# Patient Record
Sex: MALE | Race: WHITE | NOT HISPANIC OR LATINO | Employment: UNEMPLOYED | URBAN - METROPOLITAN AREA
[De-identification: names, ages, dates, MRNs, and addresses within clinical notes are randomized per-mention and may not be internally consistent; named-entity substitution may affect disease eponyms.]

---

## 2019-02-12 ENCOUNTER — HOSPITAL ENCOUNTER (EMERGENCY)
Facility: HOSPITAL | Age: 59
Discharge: HOME | End: 2019-02-12
Attending: EMERGENCY MEDICINE
Payer: MEDICAID

## 2019-02-12 VITALS
HEART RATE: 90 BPM | TEMPERATURE: 99 F | SYSTOLIC BLOOD PRESSURE: 193 MMHG | OXYGEN SATURATION: 98 % | HEIGHT: 72 IN | DIASTOLIC BLOOD PRESSURE: 97 MMHG | BODY MASS INDEX: 23.03 KG/M2 | RESPIRATION RATE: 14 BRPM | WEIGHT: 170 LBS

## 2019-02-12 DIAGNOSIS — Z76.0 ENCOUNTER FOR MEDICATION REFILL: Primary | ICD-10-CM

## 2019-02-12 PROCEDURE — 99283 EMERGENCY DEPT VISIT LOW MDM: CPT

## 2019-02-12 RX ORDER — LACTULOSE 10 G/15ML
30 SOLUTION ORAL 3 TIMES DAILY
Qty: 4050 ML | Refills: 0 | Status: SHIPPED | OUTPATIENT
Start: 2019-02-12 | End: 2019-03-14

## 2019-02-12 RX ORDER — SPIRONOLACTONE 100 MG/1
100 TABLET, FILM COATED ORAL DAILY
Qty: 30 TABLET | Refills: 0 | Status: SHIPPED | OUTPATIENT
Start: 2019-02-12 | End: 2019-10-13

## 2019-02-12 RX ORDER — LACTULOSE 10 G/15ML
SOLUTION ORAL; RECTAL 3 TIMES DAILY
COMMUNITY
End: 2019-02-12

## 2019-02-12 NOTE — ED PROVIDER NOTES
Encounter Date: 2/12/2019    SCRIBE #1 NOTE: ILDEFONSO Shilpaanahi Ro, am scribing for, and in the presence of, Liane Childress PA-C.       History     Chief Complaint   Patient presents with    Medication Refill     Hep C. New to [place] medicaid. Wants meds filled.     2/12/2019  12:38 PM     The patient is a 58 y.o. male  who presents with medication refill. Patient states he has chronic hepatitis C and new to [place] medicaid. The patient has been off of his medications since running out three weeks ago. He is requesting a refill on his aldactone 100 mg daily and Lactulose 30 ml TID. Pt is also requesting a referral to a primary health care provider. He denied any complaints. PMHx of hepatitis C, cirrhosis and HTN. No SHx.        The history is provided by the patient.     Review of patient's allergies indicates:  No Known Allergies  Past Medical History:   Diagnosis Date    Cirrhosis     Hepatitis C     Hypertension      History reviewed. No pertinent surgical history.  History reviewed. No pertinent family history.  Social History     Tobacco Use    Smoking status: Current Every Day Smoker     Types: Cigarettes   Substance Use Topics    Alcohol use: Not on file    Drug use: Not on file     Review of Systems   Constitutional: Negative for appetite change, chills and fever.        +Medication refill.   HENT: Negative for congestion, rhinorrhea and sore throat.    Respiratory: Negative for cough and shortness of breath.    Cardiovascular: Negative for chest pain.   Gastrointestinal: Negative for abdominal pain, diarrhea, nausea and vomiting.   Genitourinary: Negative for dysuria.   Musculoskeletal: Negative for back pain and myalgias.   Skin: Negative for rash.   Neurological: Negative for weakness and numbness.   Hematological: Does not bruise/bleed easily.       Physical Exam     Initial Vitals [02/12/19 1230]   BP Pulse Resp Temp SpO2   (!) 193/97 90 14 98.7 °F (37.1 °C) 98 %      MAP       --          Physical Exam    Nursing note and vitals reviewed.  Constitutional: No distress.   HENT:   Head: Normocephalic and atraumatic.   Mouth/Throat: Mucous membranes are normal.   Eyes: EOM are normal. Pupils are equal, round, and reactive to light.   Neck: Normal range of motion.   Cardiovascular: Normal rate, regular rhythm, normal heart sounds and intact distal pulses. Exam reveals no gallop and no friction rub.    No murmur heard.  Pulmonary/Chest: Breath sounds normal. He has no wheezes. He has no rhonchi. He has no rales.   Abdominal: Soft. He exhibits no distension. There is no tenderness.   Musculoskeletal: Normal range of motion. He exhibits no edema.   Neurological: He is alert and oriented to person, place, and time. He has normal strength. No cranial nerve deficit or sensory deficit.   Cranial nerves 3-12 are intact.   Skin: Skin is dry. No rash noted.   Psychiatric: He has a normal mood and affect.         ED Course   Procedures  Labs Reviewed - No data to display       Imaging Results    None          Medical Decision Making:   History:   Old Medical Records: I decided to obtain old medical records.       APC / Resident Notes:   Urgent evaluation of a 58-year-old male who presents for medication refill.  He states he recently relocated here from [place] and has not been able to establish care with a primary care provider yet.  He is requesting a refill on his Aldactone and lactulose.  He recently received wheezing and Medicaid is in the process of establishing a primary care provider.  He is alert, oriented with a normal neurological exam.  Will give a short supply of medication along with a list of local primary care providers.  Instructed him on the importance of establishing care for routine follow-up of his chronic medical problems.  He voices understanding. Return precautions given. Based on my clinical evaluation, I do not appreciate any immediate, emergent, or life threatening condition or  etiology that warrants additional workup today and feel that the patient can be discharged with close follow up care.  Patient is to follow up with their primary care provider. Case was discussed with Dr. Pa who is in agreement with the plan of care. All questions answered.          Scribe Attestation:   Scribe #1: I performed the above scribed service and the documentation accurately describes the services I performed. I attest to the accuracy of the note.    I, Liane Childress PA-C, personally performed the services described in this documentation. All medical record entries made by the scribe were at my direction and in my presence.  I have reviewed the chart and agree that the record reflects my personal performance and is accurate and complete. Liane Childress PA-C.  2:18 PM 02/12/2019             Clinical Impression:   The encounter diagnosis was Encounter for medication refill.      Disposition:   Disposition: Discharged  Condition: Stable                        Liane Childress PA-C  02/12/19 1602

## 2019-02-12 NOTE — DISCHARGE INSTRUCTIONS
Establish care with your primary care provider in order to get your routine medications filled in the future.  Take these prescribed medications.  For worsening symptoms, chest pain, shortness of breath, increased abdominal pain, high grade fever, stroke or stroke like symptoms, immediately go to the nearest Emergency Room or call 911 as soon as possible.

## 2019-03-23 ENCOUNTER — HOSPITAL ENCOUNTER (EMERGENCY)
Facility: HOSPITAL | Age: 59
Discharge: HOME | End: 2019-03-23
Attending: EMERGENCY MEDICINE
Payer: MEDICAID

## 2019-03-23 VITALS
WEIGHT: 165 LBS | OXYGEN SATURATION: 95 % | SYSTOLIC BLOOD PRESSURE: 127 MMHG | DIASTOLIC BLOOD PRESSURE: 64 MMHG | RESPIRATION RATE: 18 BRPM | BODY MASS INDEX: 22.35 KG/M2 | HEART RATE: 82 BPM | HEIGHT: 72 IN | TEMPERATURE: 97 F

## 2019-03-23 DIAGNOSIS — L03.116 CELLULITIS OF LEFT LOWER EXTREMITY: ICD-10-CM

## 2019-03-23 DIAGNOSIS — B02.8 HERPES ZOSTER WITH OTHER COMPLICATION: Primary | ICD-10-CM

## 2019-03-23 PROCEDURE — 25000003 PHARM REV CODE 250: Performed by: PHYSICIAN ASSISTANT

## 2019-03-23 PROCEDURE — 99284 EMERGENCY DEPT VISIT MOD MDM: CPT

## 2019-03-23 RX ORDER — ACYCLOVIR 800 MG/1
800 TABLET ORAL
Qty: 35 TABLET | Refills: 0 | Status: ON HOLD | OUTPATIENT
Start: 2019-03-23 | End: 2020-08-31

## 2019-03-23 RX ORDER — MUPIROCIN 20 MG/G
OINTMENT TOPICAL 3 TIMES DAILY
Qty: 30 G | Refills: 0 | Status: ON HOLD | OUTPATIENT
Start: 2019-03-23 | End: 2019-03-29 | Stop reason: HOSPADM

## 2019-03-23 RX ORDER — MUPIROCIN 20 MG/G
1 OINTMENT TOPICAL
Status: COMPLETED | OUTPATIENT
Start: 2019-03-23 | End: 2019-03-23

## 2019-03-23 RX ORDER — CLINDAMYCIN HYDROCHLORIDE 150 MG/1
300 CAPSULE ORAL
Status: COMPLETED | OUTPATIENT
Start: 2019-03-23 | End: 2019-03-23

## 2019-03-23 RX ORDER — CLINDAMYCIN HYDROCHLORIDE 150 MG/1
300 CAPSULE ORAL 4 TIMES DAILY
Qty: 56 CAPSULE | Refills: 0 | Status: ON HOLD | OUTPATIENT
Start: 2019-03-23 | End: 2019-03-29 | Stop reason: HOSPADM

## 2019-03-23 RX ORDER — OXYCODONE HYDROCHLORIDE 5 MG/1
5 TABLET ORAL EVERY 6 HOURS PRN
Qty: 12 TABLET | Refills: 0 | Status: ON HOLD | OUTPATIENT
Start: 2019-03-23 | End: 2019-03-29 | Stop reason: HOSPADM

## 2019-03-23 RX ORDER — ACYCLOVIR 200 MG/1
800 CAPSULE ORAL
Status: COMPLETED | OUTPATIENT
Start: 2019-03-23 | End: 2019-03-23

## 2019-03-23 RX ORDER — OXYCODONE HYDROCHLORIDE 5 MG/1
5 TABLET ORAL
Status: COMPLETED | OUTPATIENT
Start: 2019-03-23 | End: 2019-03-23

## 2019-03-23 RX ADMIN — CLINDAMYCIN HYDROCHLORIDE 300 MG: 150 CAPSULE ORAL at 12:03

## 2019-03-23 RX ADMIN — ACYCLOVIR 800 MG: 200 CAPSULE ORAL at 12:03

## 2019-03-23 RX ADMIN — OXYCODONE HYDROCHLORIDE 5 MG: 5 TABLET ORAL at 12:03

## 2019-03-23 RX ADMIN — MUPIROCIN 22 G: 20 OINTMENT TOPICAL at 12:03

## 2019-03-23 NOTE — ED PROVIDER NOTES
"Encounter Date: 3/23/2019    SCRIBE #1 NOTE: I, Harvinder Patel, am scribing for, and in the presence of, Guillermina Romeo PA-C.       History     Chief Complaint   Patient presents with    Rash     to lt knee for past 4 days     Time seen by provider: 11:36 AM on 03/23/2019      Nii Davidson is a 58 y.o. male with a PMHx of hepatitis C, cirrhosis, and HTN who presents to the ED for further evaluation of a rash that started 4 days ago. The patient states that he is having a rash to the right knee on the lateral side. He states that he felt a burning sensation to the right knee, along with a small amount of redness. He states that it has increased in the redness and some swelling. Patient reports that he cannot bend his right knee currently secondary to pain and swelling. Patient reports that today "It started to pus up." He denies fever, drainage, abdominal pain, vomiting, and chills. He has no reported PSHx and NKDA.       The history is provided by the patient.     Review of patient's allergies indicates:  No Known Allergies  Past Medical History:   Diagnosis Date    Cirrhosis     Hepatitis C     Hypertension      No past surgical history on file.  No family history on file.  Social History     Tobacco Use    Smoking status: Current Every Day Smoker     Types: Cigarettes   Substance Use Topics    Alcohol use: Not on file    Drug use: Not on file     Review of Systems   Constitutional: Negative for chills and fever.   HENT: Negative for facial swelling and trouble swallowing.    Eyes: Negative for discharge.   Respiratory: Negative for cough, chest tightness, shortness of breath and wheezing.    Cardiovascular: Negative for chest pain and palpitations.   Gastrointestinal: Negative for abdominal pain, diarrhea, nausea and vomiting.   Genitourinary: Negative for dysuria and hematuria.   Musculoskeletal: Positive for arthralgias and joint swelling. Negative for back pain, myalgias, neck pain and neck " stiffness.   Skin: Positive for rash. Negative for color change, pallor and wound.   Neurological: Negative for dizziness, syncope, weakness, light-headedness, numbness and headaches.   Hematological: Does not bruise/bleed easily.   Psychiatric/Behavioral: The patient is not nervous/anxious.        Physical Exam     Initial Vitals [03/23/19 1106]   BP Pulse Resp Temp SpO2   127/64 82 18 97.4 °F (36.3 °C) 95 %      MAP       --         Physical Exam    Nursing note and vitals reviewed.  Constitutional: He appears well-developed and well-nourished. He is not diaphoretic. No distress.   HENT:   Head: Normocephalic and atraumatic.   Eyes: Conjunctivae are normal.   Cardiovascular: Normal rate, regular rhythm, normal heart sounds and intact distal pulses. Exam reveals no gallop and no friction rub.    No murmur heard.  Pulmonary/Chest: Breath sounds normal. No respiratory distress. He has no wheezes. He has no rhonchi. He has no rales.   Musculoskeletal: Normal range of motion. He exhibits edema and tenderness.   2+ pitting edema noted to left lower extremity.  Palpable 2+ pedal pulses.    Neurological: He is alert and oriented to person, place, and time. He has normal strength. No sensory deficit.   Skin: Skin is warm and dry. No rash and no abscess noted. There is erythema.   Moderately sized area of erythema with central bullae noted to left lateral knee without active bleeding or discharge. No induration or joint effusion to the left lateral knee.  Second moderately sized area of erythema with central bullae noted to left posterior lower leg without bleeding, discharge or induration.     Psychiatric: He has a normal mood and affect.                     ED Course   Procedures  Labs Reviewed - No data to display       Imaging Results    None          Medical Decision Making:   History:   Old Medical Records: I decided to obtain old medical records.  Differential Diagnosis:   Cellulitis  Shingles  Abscess         APC /  "Resident Notes:   Pt is concerned he has shingles.  There is possible underlying shingles etiology given it started with a burning sensation and "blisters"; however, there is moderate amount of surrounding erythema and associated leg edema.  Will treat with both Acyclovir and Clindamycin.  No abscess in need of drainage today.  He will be discharged home and is highly encouraged to return here on Monday if no improvement.  He voices understanding and is agreeable to the plan.  He is given specific return precautions.          Scribe Attestation:   Scribe #1: I performed the above scribed service and the documentation accurately describes the services I performed. I attest to the accuracy of the note.    I, Guillermina Romeo PA-C, personally performed the services described in this documentation. All medical record entries made by the scribe were at my direction and in my presence.  I have reviewed the chart and agree that the record reflects my personal performance and is accurate and complete. Guillermina Romeo PA-C.  9:27 PM 03/23/2019             Clinical Impression:       ICD-10-CM ICD-9-CM   1. Herpes zoster with other complication B02.8 053.79   2. Cellulitis of left lower extremity L03.116 682.6         Disposition:   Disposition: Discharged  Condition: Stable                        Guillermina Romeo PA-C  03/23/19 2127    "

## 2019-03-23 NOTE — ED PROVIDER NOTES
"Encounter Date: 3/23/2019    SCRIBE #1 NOTE: I, Harvinder Patel, am scribing for, and in the presence of, Guillermina Romeo PA-C.       History     Chief Complaint   Patient presents with    Rash     to lt knee for past 4 days     Time seen by provider: 11:36 AM on 03/23/2019      Nii Davidson is a 58 y.o. male with a PMHx of hepatitis C, cirrhosis, and HTN who presents to the ED for further evaluation of a rash that started 4 days ago. The patient states that he is having a rash to the right knee on the lateral side. He states that he felt a burning sensation to the right knee, along with a small amount of redness. He states that it has increased in the redness and some swelling. Patient reports that he cannot bend his right knee currently secondary to pain and swelling. Patient reports that today "It started to pus up." He denies fever, drainage, abdominal pain, vomiting, and chills. He has no reported PSHx and NKDA.           The history is provided by the patient.     Review of patient's allergies indicates:  No Known Allergies  Past Medical History:   Diagnosis Date    Cirrhosis     Hepatitis C     Hypertension      No past surgical history on file.  No family history on file.  Social History     Tobacco Use    Smoking status: Current Every Day Smoker     Types: Cigarettes   Substance Use Topics    Alcohol use: Not on file    Drug use: Not on file     Review of Systems   Constitutional: Negative for chills and fever.   HENT: Negative for facial swelling and trouble swallowing.    Eyes: Negative for discharge.   Respiratory: Negative for cough, chest tightness, shortness of breath and wheezing.    Cardiovascular: Negative for chest pain and palpitations.   Gastrointestinal: Negative for abdominal pain, diarrhea, nausea and vomiting.   Genitourinary: Negative for dysuria and hematuria.   Musculoskeletal: Positive for arthralgias and joint swelling. Negative for back pain, myalgias, neck pain and neck " stiffness.   Skin: Positive for rash. Negative for color change, pallor and wound.   Neurological: Negative for dizziness, syncope, weakness, light-headedness, numbness and headaches.   Hematological: Does not bruise/bleed easily.   Psychiatric/Behavioral: The patient is not nervous/anxious.        Physical Exam     Initial Vitals [03/23/19 1106]   BP Pulse Resp Temp SpO2   127/64 82 18 97.4 °F (36.3 °C) 95 %      MAP       --         Physical Exam    Nursing note and vitals reviewed.  Constitutional: He appears well-developed and well-nourished. He is not diaphoretic. No distress.   HENT:   Head: Normocephalic and atraumatic.   Right Ear: External ear normal.   Left Ear: External ear normal.   Nose: Nose normal.   Mouth/Throat: Oropharynx is clear and moist.   Eyes: Conjunctivae and EOM are normal. Pupils are equal, round, and reactive to light.   Neck: Normal range of motion. Neck supple.   Cardiovascular: Normal rate, regular rhythm, normal heart sounds and intact distal pulses. Exam reveals no gallop and no friction rub.    No murmur heard.  Pulmonary/Chest: Breath sounds normal. No respiratory distress. He has no wheezes. He has no rhonchi. He has no rales.   Abdominal: Soft. He exhibits no distension and no mass. There is no tenderness.   Musculoskeletal: Normal range of motion. He exhibits no edema or tenderness.   Lymphadenopathy:     He has no cervical adenopathy.   Neurological: He is alert and oriented to person, place, and time. He has normal strength. No cranial nerve deficit or sensory deficit.   Skin: Skin is warm and dry. No rash and no abscess noted. There is erythema.   Moderately sized area of erythema with central bullae, no active bleeding or discharge. No induration to the left lateral knee and left posterior lower leg.    Psychiatric: He has a normal mood and affect.         ED Course   Procedures  Labs Reviewed - No data to display       Imaging Results    None          Medical Decision  Making:   History:   Old Medical Records: I decided to obtain old medical records.  Clinical Tests:   Lab Tests: Ordered and Reviewed  Radiological Study: Ordered and Reviewed            Scribe Attestation:   Scribe #1: I performed the above scribed service and the documentation accurately describes the services I performed. I attest to the accuracy of the note.    ***           Clinical Impression:   No diagnosis found.      Disposition:   Disposition: Discharged  Condition: Stable

## 2019-03-25 ENCOUNTER — HOSPITAL ENCOUNTER (INPATIENT)
Facility: HOSPITAL | Age: 59
LOS: 4 days | Discharge: HOME | DRG: 603 | End: 2019-03-29
Attending: EMERGENCY MEDICINE | Admitting: INTERNAL MEDICINE
Payer: MEDICAID

## 2019-03-25 DIAGNOSIS — L03.90 CELLULITIS: Primary | ICD-10-CM

## 2019-03-25 PROBLEM — D53.9 MACROCYTIC ANEMIA: Status: ACTIVE | Noted: 2019-03-25

## 2019-03-25 PROBLEM — Z72.0 TOBACCO USE: Status: ACTIVE | Noted: 2019-03-25

## 2019-03-25 PROBLEM — E87.1 HYPONATREMIA: Status: ACTIVE | Noted: 2019-03-25

## 2019-03-25 PROBLEM — Z78.9 ALCOHOL USE: Status: ACTIVE | Noted: 2019-03-25

## 2019-03-25 PROBLEM — K74.60 CIRRHOSIS: Status: ACTIVE | Noted: 2019-03-25

## 2019-03-25 LAB
ALBUMIN SERPL BCP-MCNC: 2.1 G/DL (ref 3.5–5.2)
ALP SERPL-CCNC: 243 U/L (ref 55–135)
ALT SERPL W/O P-5'-P-CCNC: 49 U/L (ref 10–44)
ANION GAP SERPL CALC-SCNC: 4 MMOL/L (ref 8–16)
ANISOCYTOSIS BLD QL SMEAR: SLIGHT
APTT BLDCRRT: 35.7 SEC (ref 21–32)
AST SERPL-CCNC: 114 U/L (ref 10–40)
BASOPHILS # BLD AUTO: ABNORMAL K/UL (ref 0–0.2)
BASOPHILS NFR BLD: 0 % (ref 0–1.9)
BILIRUB DIRECT SERPL-MCNC: 0.9 MG/DL (ref 0.1–0.3)
BILIRUB SERPL-MCNC: 1.3 MG/DL (ref 0.1–1)
BUN SERPL-MCNC: 13 MG/DL (ref 6–20)
CALCIUM SERPL-MCNC: 8.1 MG/DL (ref 8.7–10.5)
CHLORIDE SERPL-SCNC: 105 MMOL/L (ref 95–110)
CO2 SERPL-SCNC: 24 MMOL/L (ref 23–29)
CREAT SERPL-MCNC: 0.9 MG/DL (ref 0.5–1.4)
CRP SERPL-MCNC: 6.5 MG/L (ref 0–8.2)
DIFFERENTIAL METHOD: ABNORMAL
EOSINOPHIL # BLD AUTO: ABNORMAL K/UL (ref 0–0.5)
EOSINOPHIL NFR BLD: 5 % (ref 0–8)
ERYTHROCYTE [DISTWIDTH] IN BLOOD BY AUTOMATED COUNT: 15.9 % (ref 11.5–14.5)
EST. GFR  (AFRICAN AMERICAN): >60 ML/MIN/1.73 M^2
EST. GFR  (NON AFRICAN AMERICAN): >60 ML/MIN/1.73 M^2
FERRITIN SERPL-MCNC: 258 NG/ML (ref 20–300)
FOLATE SERPL-MCNC: 20 NG/ML (ref 4–24)
GLUCOSE SERPL-MCNC: 93 MG/DL (ref 70–110)
HCT VFR BLD AUTO: 34.3 % (ref 40–54)
HGB BLD-MCNC: 11.4 G/DL (ref 14–18)
HIV1+2 IGG SERPL QL IA.RAPID: NEGATIVE
INR PPP: 1.3 (ref 0.8–1.2)
LACTATE SERPL-SCNC: 0.7 MMOL/L (ref 0.5–2.2)
LACTATE SERPL-SCNC: 2.1 MMOL/L (ref 0.5–2.2)
LYMPHOCYTES # BLD AUTO: ABNORMAL K/UL (ref 1–4.8)
LYMPHOCYTES NFR BLD: 17 % (ref 18–48)
MCH RBC QN AUTO: 37.1 PG (ref 27–31)
MCHC RBC AUTO-ENTMCNC: 33.2 G/DL (ref 32–36)
MCV RBC AUTO: 112 FL (ref 82–98)
MONOCYTES # BLD AUTO: ABNORMAL K/UL (ref 0.3–1)
MONOCYTES NFR BLD: 27 % (ref 4–15)
NEUTROPHILS NFR BLD: 51 % (ref 38–73)
PLATELET # BLD AUTO: 107 K/UL (ref 150–350)
PLATELET BLD QL SMEAR: ABNORMAL
PMV BLD AUTO: 9.9 FL (ref 9.2–12.9)
POLYCHROMASIA BLD QL SMEAR: ABNORMAL
POTASSIUM SERPL-SCNC: 4 MMOL/L (ref 3.5–5.1)
PROT SERPL-MCNC: 6.5 G/DL (ref 6–8.4)
PROTHROMBIN TIME: 12.9 SEC (ref 9–12.5)
RBC # BLD AUTO: 3.07 M/UL (ref 4.6–6.2)
SODIUM SERPL-SCNC: 133 MMOL/L (ref 136–145)
TSH SERPL DL<=0.005 MIU/L-ACNC: 1.44 UIU/ML (ref 0.4–4)
VIT B12 SERPL-MCNC: >2000 PG/ML (ref 210–950)
WBC # BLD AUTO: 4.7 K/UL (ref 3.9–12.7)

## 2019-03-25 PROCEDURE — 63600175 PHARM REV CODE 636 W HCPCS: Performed by: EMERGENCY MEDICINE

## 2019-03-25 PROCEDURE — 63600175 PHARM REV CODE 636 W HCPCS: Performed by: INTERNAL MEDICINE

## 2019-03-25 PROCEDURE — 80048 BASIC METABOLIC PNL TOTAL CA: CPT

## 2019-03-25 PROCEDURE — 96372 THER/PROPH/DIAG INJ SC/IM: CPT | Mod: 59

## 2019-03-25 PROCEDURE — 85610 PROTHROMBIN TIME: CPT

## 2019-03-25 PROCEDURE — 82746 ASSAY OF FOLIC ACID SERUM: CPT

## 2019-03-25 PROCEDURE — 96367 TX/PROPH/DG ADDL SEQ IV INF: CPT | Mod: 59

## 2019-03-25 PROCEDURE — 25000003 PHARM REV CODE 250: Performed by: EMERGENCY MEDICINE

## 2019-03-25 PROCEDURE — 82728 ASSAY OF FERRITIN: CPT

## 2019-03-25 PROCEDURE — 84443 ASSAY THYROID STIM HORMONE: CPT

## 2019-03-25 PROCEDURE — 96365 THER/PROPH/DIAG IV INF INIT: CPT | Mod: 59

## 2019-03-25 PROCEDURE — S0077 INJECTION, CLINDAMYCIN PHOSP: HCPCS | Performed by: EMERGENCY MEDICINE

## 2019-03-25 PROCEDURE — 96375 TX/PRO/DX INJ NEW DRUG ADDON: CPT | Mod: 59

## 2019-03-25 PROCEDURE — 99285 EMERGENCY DEPT VISIT HI MDM: CPT | Mod: 25

## 2019-03-25 PROCEDURE — 87040 BLOOD CULTURE FOR BACTERIA: CPT

## 2019-03-25 PROCEDURE — 86703 HIV-1/HIV-2 1 RESULT ANTBDY: CPT

## 2019-03-25 PROCEDURE — 25000003 PHARM REV CODE 250: Performed by: INTERNAL MEDICINE

## 2019-03-25 PROCEDURE — 96366 THER/PROPH/DIAG IV INF ADDON: CPT | Mod: 59

## 2019-03-25 PROCEDURE — 80076 HEPATIC FUNCTION PANEL: CPT

## 2019-03-25 PROCEDURE — 12000002 HC ACUTE/MED SURGE SEMI-PRIVATE ROOM

## 2019-03-25 PROCEDURE — 86140 C-REACTIVE PROTEIN: CPT

## 2019-03-25 PROCEDURE — 87040 BLOOD CULTURE FOR BACTERIA: CPT | Mod: 59

## 2019-03-25 PROCEDURE — 83540 ASSAY OF IRON: CPT

## 2019-03-25 PROCEDURE — 82607 VITAMIN B-12: CPT

## 2019-03-25 PROCEDURE — 10060 I&D ABSCESS SIMPLE/SINGLE: CPT

## 2019-03-25 PROCEDURE — 36415 COLL VENOUS BLD VENIPUNCTURE: CPT

## 2019-03-25 PROCEDURE — 85027 COMPLETE CBC AUTOMATED: CPT

## 2019-03-25 PROCEDURE — 85730 THROMBOPLASTIN TIME PARTIAL: CPT

## 2019-03-25 PROCEDURE — 83605 ASSAY OF LACTIC ACID: CPT

## 2019-03-25 PROCEDURE — 85007 BL SMEAR W/DIFF WBC COUNT: CPT

## 2019-03-25 RX ORDER — ONDANSETRON 2 MG/ML
4 INJECTION INTRAMUSCULAR; INTRAVENOUS EVERY 8 HOURS PRN
Status: DISCONTINUED | OUTPATIENT
Start: 2019-03-25 | End: 2019-03-29 | Stop reason: HOSPADM

## 2019-03-25 RX ORDER — MORPHINE SULFATE 10 MG/ML
8 INJECTION INTRAMUSCULAR; INTRAVENOUS; SUBCUTANEOUS
Status: COMPLETED | OUTPATIENT
Start: 2019-03-25 | End: 2019-03-25

## 2019-03-25 RX ORDER — MORPHINE SULFATE 4 MG/ML
8 INJECTION, SOLUTION INTRAMUSCULAR; INTRAVENOUS
Status: DISCONTINUED | OUTPATIENT
Start: 2019-03-25 | End: 2019-03-25 | Stop reason: RX

## 2019-03-25 RX ORDER — ACETAMINOPHEN 325 MG/1
650 TABLET ORAL EVERY 8 HOURS PRN
Status: DISCONTINUED | OUTPATIENT
Start: 2019-03-25 | End: 2019-03-29 | Stop reason: HOSPADM

## 2019-03-25 RX ORDER — SODIUM CHLORIDE 0.9 % (FLUSH) 0.9 %
5 SYRINGE (ML) INJECTION
Status: DISCONTINUED | OUTPATIENT
Start: 2019-03-25 | End: 2019-03-29 | Stop reason: HOSPADM

## 2019-03-25 RX ORDER — CLINDAMYCIN PHOSPHATE 900 MG/50ML
900 INJECTION, SOLUTION INTRAVENOUS
Status: COMPLETED | OUTPATIENT
Start: 2019-03-25 | End: 2019-03-25

## 2019-03-25 RX ORDER — OXYCODONE HYDROCHLORIDE 5 MG/1
5 TABLET ORAL EVERY 6 HOURS PRN
Status: DISCONTINUED | OUTPATIENT
Start: 2019-03-25 | End: 2019-03-29 | Stop reason: HOSPADM

## 2019-03-25 RX ORDER — LIDOCAINE HCL/EPINEPHRINE/PF 2%-1:200K
10 VIAL (ML) INJECTION ONCE
Status: COMPLETED | OUTPATIENT
Start: 2019-03-25 | End: 2019-03-25

## 2019-03-25 RX ORDER — THIAMINE HCL 100 MG
100 TABLET ORAL DAILY
Status: DISCONTINUED | OUTPATIENT
Start: 2019-03-26 | End: 2019-03-29 | Stop reason: HOSPADM

## 2019-03-25 RX ORDER — SPIRONOLACTONE 25 MG/1
100 TABLET ORAL DAILY
Status: DISCONTINUED | OUTPATIENT
Start: 2019-03-26 | End: 2019-03-29 | Stop reason: HOSPADM

## 2019-03-25 RX ORDER — ENOXAPARIN SODIUM 100 MG/ML
40 INJECTION SUBCUTANEOUS EVERY 24 HOURS
Status: DISCONTINUED | OUTPATIENT
Start: 2019-03-25 | End: 2019-03-29 | Stop reason: HOSPADM

## 2019-03-25 RX ORDER — ACETAMINOPHEN 325 MG/1
650 TABLET ORAL EVERY 6 HOURS PRN
Status: DISCONTINUED | OUTPATIENT
Start: 2019-03-25 | End: 2019-03-25

## 2019-03-25 RX ORDER — VANCOMYCIN HCL IN 5 % DEXTROSE 1G/250ML
1000 PLASTIC BAG, INJECTION (ML) INTRAVENOUS
Status: DISCONTINUED | OUTPATIENT
Start: 2019-03-25 | End: 2019-03-25

## 2019-03-25 RX ORDER — ACYCLOVIR 200 MG/1
800 CAPSULE ORAL
Status: DISCONTINUED | OUTPATIENT
Start: 2019-03-25 | End: 2019-03-29 | Stop reason: SDUPTHER

## 2019-03-25 RX ORDER — IBUPROFEN 200 MG
1 TABLET ORAL DAILY
Status: DISCONTINUED | OUTPATIENT
Start: 2019-03-26 | End: 2019-03-29 | Stop reason: HOSPADM

## 2019-03-25 RX ORDER — MORPHINE SULFATE 2 MG/ML
2 INJECTION, SOLUTION INTRAMUSCULAR; INTRAVENOUS EVERY 4 HOURS PRN
Status: DISCONTINUED | OUTPATIENT
Start: 2019-03-25 | End: 2019-03-29 | Stop reason: HOSPADM

## 2019-03-25 RX ADMIN — ENOXAPARIN SODIUM 40 MG: 100 INJECTION SUBCUTANEOUS at 08:03

## 2019-03-25 RX ADMIN — ACYCLOVIR 800 MG: 200 CAPSULE ORAL at 11:03

## 2019-03-25 RX ADMIN — VANCOMYCIN HYDROCHLORIDE 1500 MG: 1 INJECTION, POWDER, LYOPHILIZED, FOR SOLUTION INTRAVENOUS at 08:03

## 2019-03-25 RX ADMIN — MORPHINE SULFATE 8 MG: 10 INJECTION, SOLUTION INTRAMUSCULAR; INTRAVENOUS at 05:03

## 2019-03-25 RX ADMIN — CLINDAMYCIN IN 5 PERCENT DEXTROSE 900 MG: 18 INJECTION, SOLUTION INTRAVENOUS at 05:03

## 2019-03-25 RX ADMIN — MORPHINE SULFATE 2 MG: 2 INJECTION, SOLUTION INTRAMUSCULAR; INTRAVENOUS at 11:03

## 2019-03-25 RX ADMIN — LIDOCAINE HYDROCHLORIDE,EPINEPHRINE BITARTRATE 10 ML: 20; .005 INJECTION, SOLUTION EPIDURAL; INFILTRATION; INTRACAUDAL; PERINEURAL at 05:03

## 2019-03-25 NOTE — SUBJECTIVE & OBJECTIVE
Past Medical History:   Diagnosis Date    Cirrhosis     Hepatitis C     Hypertension        History reviewed. No pertinent surgical history.    Review of patient's allergies indicates:  No Known Allergies    No current facility-administered medications on file prior to encounter.      Current Outpatient Medications on File Prior to Encounter   Medication Sig    acyclovir (ZOVIRAX) 800 MG Tab Take 1 tablet (800 mg total) by mouth 5 (five) times daily. for 7 days    clindamycin (CLEOCIN) 150 MG capsule Take 2 capsules (300 mg total) by mouth 4 (four) times daily. for 7 days    mupirocin (BACTROBAN) 2 % ointment Apply topically 3 (three) times daily. for 10 days    oxyCODONE (ROXICODONE) 5 MG immediate release tablet Take 1 tablet (5 mg total) by mouth every 6 (six) hours as needed for Pain.    spironolactone (ALDACTONE) 100 MG tablet Take 1 tablet (100 mg total) by mouth once daily.     Family History     None        Tobacco Use    Smoking status: Current Every Day Smoker     Types: Cigarettes   Substance and Sexual Activity    Alcohol use: Not on file    Drug use: Not on file    Sexual activity: Not on file     Review of Systems   Constitutional: Negative for chills and fever.   HENT: Negative for congestion, ear pain and sore throat.    Eyes: Negative for pain, redness and visual disturbance.   Respiratory: Negative for apnea, cough, choking, chest tightness, shortness of breath, wheezing and stridor.    Cardiovascular: Positive for leg swelling. Negative for chest pain and palpitations.   Gastrointestinal: Negative for abdominal distention, abdominal pain, constipation, diarrhea, nausea and vomiting.   Endocrine: Negative for cold intolerance and heat intolerance.   Genitourinary: Negative for difficulty urinating, dysuria and hematuria.   Musculoskeletal: Positive for joint swelling. Negative for arthralgias and back pain.   Skin: Negative for color change, pallor, rash and wound.    Allergic/Immunologic: Negative for environmental allergies and immunocompromised state.   Neurological: Negative for dizziness, tremors, seizures, syncope, weakness and headaches.   Hematological: Negative for adenopathy. Bruises/bleeds easily.   Psychiatric/Behavioral: Negative for agitation and behavioral problems.   All other systems reviewed and are negative.    Objective:     Vital Signs (Most Recent):  Temp: 99.3 °F (37.4 °C) (03/25/19 1636)  Pulse: 77 (03/25/19 1832)  Resp: 18 (03/25/19 1636)  BP: 118/61 (03/25/19 1832)  SpO2: 100 % (03/25/19 1832) Vital Signs (24h Range):  Temp:  [99.3 °F (37.4 °C)] 99.3 °F (37.4 °C)  Pulse:  [77-83] 77  Resp:  [18] 18  SpO2:  [97 %-100 %] 100 %  BP: (103-118)/(57-61) 118/61     Weight: 74.8 kg (165 lb)  Body mass index is 22.38 kg/m².    Physical Exam   Constitutional: He is oriented to person, place, and time. He appears well-developed and well-nourished. No distress.   HENT:   Head: Normocephalic and atraumatic.   Mouth/Throat: No oropharyngeal exudate.   Eyes: Scleral icterus is present.   Neck: Normal range of motion. Neck supple.   Cardiovascular: Normal rate, regular rhythm, normal heart sounds and intact distal pulses. Exam reveals no gallop and no friction rub.   No murmur heard.  Pulmonary/Chest: Effort normal and breath sounds normal. No stridor. No respiratory distress. He has no wheezes. He has no rales. He exhibits no tenderness.   Abdominal: Soft. Bowel sounds are normal. He exhibits no distension and no mass. There is no tenderness. There is no rebound and no guarding.   Healed surgical incision   Musculoskeletal: He exhibits edema and tenderness. He exhibits no deformity.   Images in media tab. Abscess on lateral knee s/p I&D with surrounding erythema, induration and very tender. Decreased ROM of left knee secondary to pain. ?small effusion present. Abscess s/p I&D on posterior calf with surrounding erythema and tenderness.    Neurological: He is alert and  oriented to person, place, and time.   No asterixis   Skin: Skin is warm and dry. Capillary refill takes less than 2 seconds. Rash noted. He is not diaphoretic. There is erythema.   Psychiatric: He has a normal mood and affect. His behavior is normal.   Nursing note and vitals reviewed.       Significant Labs: All pertinent labs within the past 24 hours have been reviewed.    Significant Imaging: I have reviewed and interpreted all pertinent imaging results/findings within the past 24 hours.

## 2019-03-25 NOTE — ASSESSMENT & PLAN NOTE
MELD-Na 14. No evidence of acute decompensation at this time  - Home aldactone  - Lasix added on admision  - HBV immune

## 2019-03-25 NOTE — ED NOTES
"Presents to the ER with c/o wound to left lateral leg that started as a blister. Patient reports being seen on Friday and was diagnosed with shingles. Patient reports that he started to take the antibiotics, "But I was supposed to take them 5 times a day and I can't do that all night if I am sleeping." Patient reports that the blister broke open and is now to separate wounds. Denies any fever at home and is afebrile upon arrival to ED. + for warmth, erythema and draining fluid." Mucous membranes are pink and moist. Skin is warm and dry.  Lungs are clear bilaterally, respirations are regular and unlabored. Denies cough, congestion, rhinorrhea or SOB. BS active x4, no tenderness with palpation, abd is soft and not distended. Denies any appetite or activity change. S1S2, capillary refill is < 2 seconds. Denies dysuria, difficulty urinating, frequency, numbness, tingling or weakness. SHARI ROSALESS    "

## 2019-03-25 NOTE — ASSESSMENT & PLAN NOTE
?secondary bacterial infection following zoster  - Continue vancomycin  - No evidence of septic arthritis. Appreciate ortho assistance  - Continue acyclovir  - Wound care

## 2019-03-25 NOTE — ED PROVIDER NOTES
Encounter Date: 3/25/2019    SCRIBE #1 NOTE: IShilpa, am scribing for, and in the presence of, Dr. Schafer.       History     Chief Complaint   Patient presents with    Herpes Zoster     pain to left  knee / on antibiotics for knee infection      3/25/2019  4:46 PM     The patient is a 58 y.o. male  who presents with rash. Patient c/o gradual onset of rash with moderate sharp pain to the left leg which started 1 week ago. He reports the area started off as a blister but now he has two wounds to the left leg with swelling, redness and drainage. Patient was diagnosed with shingles on Friday and is currently on clindamycin and bactroban. He denies any fevers, nausea or vomiting. PMHx of hepatitis C, cirrhosis and HTN. No SHx.      The history is provided by the patient.     Review of patient's allergies indicates:  No Known Allergies  Past Medical History:   Diagnosis Date    Cirrhosis     Hepatitis C     Hypertension      History reviewed. No pertinent surgical history.  History reviewed. No pertinent family history.  Social History     Tobacco Use    Smoking status: Current Every Day Smoker     Types: Cigarettes   Substance Use Topics    Alcohol use: Not on file    Drug use: Not on file     Review of Systems   Constitutional: Negative for appetite change, chills and fever.   HENT: Negative for congestion, rhinorrhea and sore throat.    Respiratory: Negative for cough and shortness of breath.    Cardiovascular: Positive for leg swelling (left leg). Negative for chest pain.   Gastrointestinal: Negative for abdominal pain, diarrhea, nausea and vomiting.   Genitourinary: Negative for dysuria.   Musculoskeletal: Positive for arthralgias (left knee). Negative for back pain and myalgias.   Skin: Positive for color change (redness to left leg), rash (left leg) and wound (left leg).   Neurological: Negative for weakness and numbness.   Hematological: Does not bruise/bleed easily.       Physical Exam      Initial Vitals [03/25/19 1636]   BP Pulse Resp Temp SpO2   -- -- 18 99.3 °F (37.4 °C) --      MAP       --         Physical Exam    Nursing note and vitals reviewed.  Constitutional: No distress.   HENT:   Head: Normocephalic and atraumatic.   Mouth/Throat: Mucous membranes are normal.   Eyes: EOM are normal. Pupils are equal, round, and reactive to light.   Neck: Normal range of motion.   Cardiovascular: Normal rate, regular rhythm, normal heart sounds and intact distal pulses. Exam reveals no gallop and no friction rub.    No murmur heard.  Pulmonary/Chest: Breath sounds normal. He has no wheezes. He has no rhonchi. He has no rales.   Abdominal: Soft. He exhibits no distension. There is no tenderness.   Musculoskeletal: Normal range of motion. He exhibits tenderness. He exhibits no edema.   Neurological: He is alert and oriented to person, place, and time. He has normal strength.   Skin: Skin is dry. There is erythema.   2 x 2.5 CM with fluctuant area of lateral left knee with purulent drainage, surrounding erythema and calor. There is induration, TTP and swelling to the left calf.   Psychiatric: He has a normal mood and affect.         ED Course   I & D - Incision and Drainage  Date/Time: 3/25/2019 6:49 PM  Performed by: Nik Schafer III, MD  Authorized by: Nik Schafer III, MD   Type: abscess  Body area: lower extremity  Anesthesia: local infiltration    Anesthesia:  Local Anesthetic: lidocaine 2% with epinephrine  Anesthetic total: 3 mL  Scalpel size: 11  Incision type: single straight  Complexity: simple  Drainage: serosanguinous  Drainage amount: scant  Wound treatment: incision  Comments: Three areas of the left lateral knee or incised and drained with scant exudate.  Posterior catheterization is incised with no exudate expressed.        Labs Reviewed - No data to display       Imaging Results    None          Medical Decision Making:   History:   Old Medical Records: I decided to obtain old  medical records.  Clinical Tests:   Lab Tests: Reviewed and Ordered  ED Management:  58-year-old male presents with redness to the left lateral knee and posterior calf.  The symptoms are strongly consistent with cellulitis.  Incision and drainage is performed with a scant amount of exudate.  He has no joint effusion with septic joint much less likely.  He will be admitted for IV antibiotics.  Other:   I have discussed this case with another health care provider.       <> Summary of the Discussion: Discussed with Dr. Kincaid who will admit the patient            Scribe Attestation:   Scribe #1: I performed the above scribed service and the documentation accurately describes the services I performed. I attest to the accuracy of the note.     I, Dr. Nik Schafer III, personally performed the services described in this documentation. All medical record entries made by the scribe were at my direction and in my presence.  I have reviewed the chart and agree that the record reflects my personal performance and is accurate and complete           Clinical Impression:       ICD-10-CM ICD-9-CM   1. Cellulitis L03.90 682.9                                Nik Schafer III, MD  03/25/19 [id number]

## 2019-03-25 NOTE — HPI
Mr. 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 Patient was in the ED 2 days ago and treated for herpes zoster with acyclovir and clinda. He reports pain, swelling, and redness only worsened. Also reports substantial knee pain and difficulty bending. He denies any alcohol intake x 10 days. No history of DT. Of note, patient reports going to [facility] recently and had an u/s of left lower extremity which was negative for DVT.     In the ED, patient was afebrile and HD stable. I&D x 3 abscesses with purulent drainage, and given clindamycin. Medicine consulted for evaluation and admission.

## 2019-03-26 ENCOUNTER — CLINICAL SUPPORT (OUTPATIENT)
Dept: SMOKING CESSATION | Facility: CLINIC | Age: 59
End: 2019-03-26
Payer: COMMERCIAL

## 2019-03-26 DIAGNOSIS — F17.200 NICOTINE DEPENDENCE: Primary | ICD-10-CM

## 2019-03-26 LAB
ALBUMIN SERPL BCP-MCNC: 2 G/DL (ref 3.5–5.2)
ALP SERPL-CCNC: 234 U/L (ref 55–135)
ALT SERPL W/O P-5'-P-CCNC: 46 U/L (ref 10–44)
ANION GAP SERPL CALC-SCNC: 4 MMOL/L (ref 8–16)
ANISOCYTOSIS BLD QL SMEAR: SLIGHT
AST SERPL-CCNC: 95 U/L (ref 10–40)
BASOPHILS # BLD AUTO: ABNORMAL K/UL (ref 0–0.2)
BASOPHILS NFR BLD: 0 % (ref 0–1.9)
BILIRUB SERPL-MCNC: 1.5 MG/DL (ref 0.1–1)
BUN SERPL-MCNC: 10 MG/DL (ref 6–20)
CALCIUM SERPL-MCNC: 8 MG/DL (ref 8.7–10.5)
CHLORIDE SERPL-SCNC: 105 MMOL/L (ref 95–110)
CO2 SERPL-SCNC: 25 MMOL/L (ref 23–29)
CREAT SERPL-MCNC: 0.8 MG/DL (ref 0.5–1.4)
DIFFERENTIAL METHOD: ABNORMAL
EOSINOPHIL # BLD AUTO: ABNORMAL K/UL (ref 0–0.5)
EOSINOPHIL NFR BLD: 6 % (ref 0–8)
ERYTHROCYTE [DISTWIDTH] IN BLOOD BY AUTOMATED COUNT: 15.9 % (ref 11.5–14.5)
EST. GFR  (AFRICAN AMERICAN): >60 ML/MIN/1.73 M^2
EST. GFR  (NON AFRICAN AMERICAN): >60 ML/MIN/1.73 M^2
GLUCOSE SERPL-MCNC: 109 MG/DL (ref 70–110)
HCT VFR BLD AUTO: 34 % (ref 40–54)
HGB BLD-MCNC: 11.5 G/DL (ref 14–18)
IRON SERPL-MCNC: 85 UG/DL (ref 45–160)
LYMPHOCYTES # BLD AUTO: ABNORMAL K/UL (ref 1–4.8)
LYMPHOCYTES NFR BLD: 26 % (ref 18–48)
MCH RBC QN AUTO: 37.5 PG (ref 27–31)
MCHC RBC AUTO-ENTMCNC: 33.8 G/DL (ref 32–36)
MCV RBC AUTO: 111 FL (ref 82–98)
MONOCYTES # BLD AUTO: ABNORMAL K/UL (ref 0.3–1)
MONOCYTES NFR BLD: 24 % (ref 4–15)
NEUTROPHILS NFR BLD: 43 % (ref 38–73)
NEUTS BAND NFR BLD MANUAL: 1 %
PLATELET # BLD AUTO: 101 K/UL (ref 150–350)
PLATELET BLD QL SMEAR: ABNORMAL
PMV BLD AUTO: 9.7 FL (ref 9.2–12.9)
POLYCHROMASIA BLD QL SMEAR: ABNORMAL
POTASSIUM SERPL-SCNC: 4.2 MMOL/L (ref 3.5–5.1)
PROT SERPL-MCNC: 6.1 G/DL (ref 6–8.4)
RBC # BLD AUTO: 3.06 M/UL (ref 4.6–6.2)
SATURATED IRON: 35 % (ref 20–50)
SODIUM SERPL-SCNC: 134 MMOL/L (ref 136–145)
TOTAL IRON BINDING CAPACITY: 241 UG/DL (ref 250–450)
TRANSFERRIN SERPL-MCNC: 163 MG/DL (ref 200–375)
WBC # BLD AUTO: 4 K/UL (ref 3.9–12.7)

## 2019-03-26 PROCEDURE — 12000002 HC ACUTE/MED SURGE SEMI-PRIVATE ROOM

## 2019-03-26 PROCEDURE — 25000003 PHARM REV CODE 250: Performed by: EMERGENCY MEDICINE

## 2019-03-26 PROCEDURE — 25000003 PHARM REV CODE 250: Performed by: INTERNAL MEDICINE

## 2019-03-26 PROCEDURE — 99233 SBSQ HOSP IP/OBS HIGH 50: CPT | Mod: ,,, | Performed by: ORTHOPAEDIC SURGERY

## 2019-03-26 PROCEDURE — S4991 NICOTINE PATCH NONLEGEND: HCPCS | Performed by: INTERNAL MEDICINE

## 2019-03-26 PROCEDURE — 63600175 PHARM REV CODE 636 W HCPCS: Performed by: INTERNAL MEDICINE

## 2019-03-26 PROCEDURE — 85007 BL SMEAR W/DIFF WBC COUNT: CPT

## 2019-03-26 PROCEDURE — 80053 COMPREHEN METABOLIC PANEL: CPT

## 2019-03-26 PROCEDURE — 99407 PR TOBACCO USE CESSATION INTENSIVE >10 MINUTES: ICD-10-PCS | Mod: S$GLB,,, | Performed by: HOSPITALIST

## 2019-03-26 PROCEDURE — 99407 BEHAV CHNG SMOKING > 10 MIN: CPT | Mod: S$GLB,,, | Performed by: HOSPITALIST

## 2019-03-26 PROCEDURE — 63600175 PHARM REV CODE 636 W HCPCS: Performed by: EMERGENCY MEDICINE

## 2019-03-26 PROCEDURE — 36415 COLL VENOUS BLD VENIPUNCTURE: CPT

## 2019-03-26 PROCEDURE — 85027 COMPLETE CBC AUTOMATED: CPT

## 2019-03-26 PROCEDURE — 99233 PR SUBSEQUENT HOSPITAL CARE,LEVL III: ICD-10-PCS | Mod: ,,, | Performed by: ORTHOPAEDIC SURGERY

## 2019-03-26 RX ORDER — FUROSEMIDE 40 MG/1
40 TABLET ORAL DAILY
Status: DISCONTINUED | OUTPATIENT
Start: 2019-03-26 | End: 2019-03-28

## 2019-03-26 RX ADMIN — ACYCLOVIR 800 MG: 200 CAPSULE ORAL at 09:03

## 2019-03-26 RX ADMIN — NICOTINE 1 PATCH: 14 PATCH, EXTENDED RELEASE TRANSDERMAL at 09:03

## 2019-03-26 RX ADMIN — SPIRONOLACTONE 100 MG: 25 TABLET ORAL at 09:03

## 2019-03-26 RX ADMIN — ACYCLOVIR 800 MG: 200 CAPSULE ORAL at 01:03

## 2019-03-26 RX ADMIN — MORPHINE SULFATE 2 MG: 2 INJECTION, SOLUTION INTRAMUSCULAR; INTRAVENOUS at 09:03

## 2019-03-26 RX ADMIN — VANCOMYCIN HYDROCHLORIDE 1250 MG: 1 INJECTION, POWDER, LYOPHILIZED, FOR SOLUTION INTRAVENOUS at 09:03

## 2019-03-26 RX ADMIN — FUROSEMIDE 40 MG: 40 TABLET ORAL at 09:03

## 2019-03-26 RX ADMIN — Medication 100 MG: at 09:03

## 2019-03-26 RX ADMIN — VANCOMYCIN HYDROCHLORIDE 1250 MG: 1 INJECTION, POWDER, LYOPHILIZED, FOR SOLUTION INTRAVENOUS at 08:03

## 2019-03-26 RX ADMIN — MORPHINE SULFATE 2 MG: 2 INJECTION, SOLUTION INTRAMUSCULAR; INTRAVENOUS at 06:03

## 2019-03-26 RX ADMIN — ACYCLOVIR 800 MG: 200 CAPSULE ORAL at 06:03

## 2019-03-26 RX ADMIN — ACYCLOVIR 800 MG: 200 CAPSULE ORAL at 05:03

## 2019-03-26 RX ADMIN — ENOXAPARIN SODIUM 40 MG: 100 INJECTION SUBCUTANEOUS at 05:03

## 2019-03-26 RX ADMIN — OXYCODONE HYDROCHLORIDE 5 MG: 5 TABLET ORAL at 12:03

## 2019-03-26 NOTE — CONSULTS
Nii Davidson [id number] is a 58 y.o. male who has been consulted for vancomycin dosing.    The patient has the following labs:     Date Creatinine (mg/dl)    BUN WBC Count   3/25/2019 Estimated Creatinine Clearance: 94.7 mL/min (based on SCr of 0.9 mg/dL). Lab Results   Component Value Date    BUN 13 03/25/2019     Lab Results   Component Value Date    WBC 4.70 03/25/2019        Current weight is 74.8 kg (165 lb)    The patient will be started on vancomycin at a dose of 1500 mg x 1 dose and then 1.25 gm every 12 hours (17 mg/kg/dose).  The vancomycin trough has been ordered for 03/27 at 0730.  Target trough goal is 10 to 15 mg/dL for cellulitis.    Patient will be followed by pharmacy for changes in renal function, toxicity, and efficacy.   Thank you for allowing us to participate in this patient's care.     Charles Schumacher, SeanD

## 2019-03-26 NOTE — H&P
[facility] Medicine  History & Physical    Patient Name: Nii Davidson  MRN: [id number]  Admission Date: 3/25/2019  Attending Physician: Nik Schafer III, MD   Primary Care Provider: Primary Doctor No         Patient information was obtained from patient and ER records.     Subjective:     Principal Problem:Cellulitis    Chief Complaint:   Chief Complaint   Patient presents with    Herpes Zoster     pain to left  knee / on antibiotics for knee infection         HPI: Mr. 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 Patient was in the ED 2 days ago and treated for herpes zoster with acyclovir and clinda. He reports pain, swelling, and redness only worsened. Also reports substantial knee pain and difficulty bending. He denies any alcohol intake x 10 days. No history of DT. Of note, patient reports going to [facility] recently and had an u/s of left lower extremity which was negative for DVT.     In the ED, patient was afebrile and HD stable. I&D x 3 abscesses with purulent drainage, and given clindamycin. Medicine consulted for evaluation and admission.     No new subjective & objective note has been filed under this hospital service since the last note was generated.    Past Medical History:   Diagnosis Date    Cirrhosis      Hepatitis C      Hypertension           History reviewed. No pertinent surgical history.     Review of patient's allergies indicates:  No Known Allergies     No current facility-administered medications on file prior to encounter.            Current Outpatient Medications on File Prior to Encounter   Medication Sig    acyclovir (ZOVIRAX) 800 MG Tab Take 1 tablet (800 mg total) by mouth 5 (five) times daily. for 7 days    clindamycin (CLEOCIN) 150 MG capsule Take 2 capsules (300 mg total) by mouth 4 (four) times daily. for 7 days     mupirocin (BACTROBAN) 2 % ointment Apply topically 3 (three) times daily. for 10 days    oxyCODONE (ROXICODONE) 5 MG immediate release tablet Take 1 tablet (5 mg total) by mouth every 6 (six) hours as needed for Pain.    spironolactone (ALDACTONE) 100 MG tablet Take 1 tablet (100 mg total) by mouth once daily.          Family History      None                Tobacco Use    Smoking status: Current Every Day Smoker       Types: Cigarettes   Substance and Sexual Activity    Alcohol use: Not on file    Drug use: Not on file    Sexual activity: Not on file      Review of Systems   Constitutional: Negative for chills and fever.   HENT: Negative for congestion, ear pain and sore throat.    Eyes: Negative for pain, redness and visual disturbance.   Respiratory: Negative for apnea, cough, choking, chest tightness, shortness of breath, wheezing and stridor.    Cardiovascular: Positive for leg swelling. Negative for chest pain and palpitations.   Gastrointestinal: Negative for abdominal distention, abdominal pain, constipation, diarrhea, nausea and vomiting.   Endocrine: Negative for cold intolerance and heat intolerance.   Genitourinary: Negative for difficulty urinating, dysuria and hematuria.   Musculoskeletal: Positive for joint swelling. Negative for arthralgias and back pain.   Skin: Negative for color change, pallor, rash and wound.   Allergic/Immunologic: Negative for environmental allergies and immunocompromised state.   Neurological: Negative for dizziness, tremors, seizures, syncope, weakness and headaches.   Hematological: Negative for adenopathy. Bruises/bleeds easily.   Psychiatric/Behavioral: Negative for agitation and behavioral problems.   All other systems reviewed and are negative.     Objective:      Vital Signs (Most Recent):  Temp: 99.3 °F (37.4 °C) (03/25/19 1636)  Pulse: 77 (03/25/19 1832)  Resp: 18 (03/25/19 1636)  BP: 118/61 (03/25/19 1832)  SpO2: 100 % (03/25/19 1832) Vital Signs (24h  Range):  Temp:  [99.3 °F (37.4 °C)] 99.3 °F (37.4 °C)  Pulse:  [77-83] 77  Resp:  [18] 18  SpO2:  [97 %-100 %] 100 %  BP: (103-118)/(57-61) 118/61      Weight: 74.8 kg (165 lb)  Body mass index is 22.38 kg/m².     Physical Exam   Constitutional: He is oriented to person, place, and time. He appears well-developed and well-nourished. No distress.   HENT:   Head: Normocephalic and atraumatic.   Mouth/Throat: No oropharyngeal exudate.   Eyes: Scleral icterus is present.   Neck: Normal range of motion. Neck supple.   Cardiovascular: Normal rate, regular rhythm, normal heart sounds and intact distal pulses. Exam reveals no gallop and no friction rub.   No murmur heard.  Pulmonary/Chest: Effort normal and breath sounds normal. No stridor. No respiratory distress. He has no wheezes. He has no rales. He exhibits no tenderness.   Abdominal: Soft. Bowel sounds are normal. He exhibits no distension and no mass. There is no tenderness. There is no rebound and no guarding.   Healed surgical incision   Musculoskeletal: He exhibits edema and tenderness. He exhibits no deformity.   Images in media tab. Abscess on lateral knee s/p I&D with surrounding erythema, induration and very tender. Decreased ROM of left knee secondary to pain. ?small effusion present. Abscess s/p I&D on posterior calf with surrounding erythema and tenderness.    Neurological: He is alert and oriented to person, place, and time.   No asterixis   Skin: Skin is warm and dry. Capillary refill takes less than 2 seconds. Rash noted. He is not diaphoretic. There is erythema.   Psychiatric: He has a normal mood and affect. His behavior is normal.   Nursing note and vitals reviewed.        Significant Labs: All pertinent labs within the past 24 hours have been reviewed.     Significant Imaging: I have reviewed and interpreted all pertinent imaging results/findings within the past 24 hours.    Assessment/Plan:     * Cellulitis  ?secondary bacterial infection following  zoster  - Start vancomycin  - Consult ortho to evaluate left knee for evidence of septic arthritis  - Continue acyclovir  - Consult wound care    Tobacco use  Nicotine supplementation  Counseled on importance of cessation      Hyponatremia  Mild - suspect secondary to cirrhosis  - Monitor    Macrocytic anemia  Suspect secondary to liver dysfunction  - Check B12/folate      Cirrhosis  No evidence of acute decompensation at this time  - Home aldactone  - HBV immune    Alcohol use  - Patient denies any recent alcohol use x 10+ days  - Will empirically give thiamine  - Monitor for withdrawal, but don't expect if 10+ days since last drink    VTE Risk Mitigation (From admission, onward)        Ordered     enoxaparin injection 40 mg  Daily      03/25/19 1832     IP VTE HIGH RISK PATIENT  Once      03/25/19 1832           Cole Kincaid MD  Department of Hospital Medicine   [facility]

## 2019-03-26 NOTE — PLAN OF CARE
"Problem: Adult Inpatient Plan of Care  Goal: Plan of Care Review  Outcome: Ongoing (interventions implemented as appropriate)  Plan of care review with pt, pt states "understanding," wound care to LLE performed, pt tolerated well, +2-3 edema palpated with + marco pedal pulses, LLE warm to touch, pain is manage with current regimen,no redness/swelling noted to IV site, remains afebrile while receiving antibiotic therapy, see flowsheet, will continue to monitor, observe and note any changes, safety maintain        "

## 2019-03-26 NOTE — NURSING
Situation-         I am Tali Antonio calling JAMES Saavedra NP  from [facility]   in regards to Nii Davidson who is a 58 y.o. year old male admitted with Cellulitis who is requesting a nicotine patch and was diagnosed Friday with shingles but is not in isolation.  They did do an I&D in the ED today.     Background-  Has a past medical history of   Past Medical History:   Diagnosis Date    Cirrhosis     Encounter for blood transfusion     Hepatitis C     Hypertension    . Most recent vitals include  Temp:  [99.3 °F (37.4 °C)]   Pulse:  [77-83]   Resp:  [16-18]   BP: (103-124)/(57-78)   SpO2:  [97 %-100 %]  and labs          Assessment-   Has the following issues (abnormal labs, abnormal vitals, change in status, uncontrolled symptoms, patient request) Who is having Patient Request- pt requests nicotine patch and Other Issues- Patient diagnosed with shingles on Friday by ED doc.  Pt is not on isolation precautions.  Do we need to put him on precautions?    Recommendation- What is the change in the plan of care requested? Do you think we should- order a nicotine patch and isolation precautions. And if so does the patient need to be in a negative pressure room?*.    Plan-  What did we decide to do? This issue is considered Urgent I have notified physician via  Secure Chat @ 10:37 PM    Plan was discussed and is as follows negative pressure not needed

## 2019-03-26 NOTE — PROGRESS NOTES
Patient educated on smoking cessation program patient interested in quitting smokes a pack a day patient signed up at this time

## 2019-03-26 NOTE — NURSING
Patient to floor via W/C.  VSS. C/O pain to knee 7/10.  No other complaints at this time. Will continue to monitor.

## 2019-03-26 NOTE — PROGRESS NOTES
[facility]  Progress Note    Patient Name: Nii Davidson  MRN: [id number]  Patient Class: IP- Inpatient   Admission Date: 3/25/2019  Length of Stay: 0 days  Attending Physician: Nik Schafer III, MD  Primary Care Provider: Primary Doctor No        Subjective:     Principal Problem:Cellulitis    HPI:  Mr. 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 Patient was in the ED 2 days ago and treated for herpes zoster with acyclovir and clinda. He reports pain, swelling, and redness only worsened. Also reports substantial knee pain and difficulty bending. He denies any alcohol intake x 10 days. No history of DT. Of note, patient reports going to [facility] recently and had an u/s of left lower extremity which was negative for DVT.     In the ED, patient was afebrile and HD stable. I&D x 3 abscesses with purulent drainage, and given clindamycin. Medicine consulted for evaluation and admission.     Hospital Course:  No notes on file    Past Medical History:   Diagnosis Date    Cirrhosis     Hepatitis C     Hypertension        History reviewed. No pertinent surgical history.    Review of patient's allergies indicates:  No Known Allergies    No current facility-administered medications on file prior to encounter.      Current Outpatient Medications on File Prior to Encounter   Medication Sig    acyclovir (ZOVIRAX) 800 MG Tab Take 1 tablet (800 mg total) by mouth 5 (five) times daily. for 7 days    clindamycin (CLEOCIN) 150 MG capsule Take 2 capsules (300 mg total) by mouth 4 (four) times daily. for 7 days    mupirocin (BACTROBAN) 2 % ointment Apply topically 3 (three) times daily. for 10 days    oxyCODONE (ROXICODONE) 5 MG immediate release tablet Take 1 tablet (5 mg total) by mouth every 6 (six) hours as needed for Pain.    spironolactone (ALDACTONE)  100 MG tablet Take 1 tablet (100 mg total) by mouth once daily.     Family History     None        Tobacco Use    Smoking status: Current Every Day Smoker     Types: Cigarettes   Substance and Sexual Activity    Alcohol use: Not on file    Drug use: Not on file    Sexual activity: Not on file     Review of Systems   Constitutional: Negative for chills and fever.   HENT: Negative for congestion, ear pain and sore throat.    Eyes: Negative for pain, redness and visual disturbance.   Respiratory: Negative for apnea, cough, choking, chest tightness, shortness of breath, wheezing and stridor.    Cardiovascular: Positive for leg swelling. Negative for chest pain and palpitations.   Gastrointestinal: Negative for abdominal distention, abdominal pain, constipation, diarrhea, nausea and vomiting.   Endocrine: Negative for cold intolerance and heat intolerance.   Genitourinary: Negative for difficulty urinating, dysuria and hematuria.   Musculoskeletal: Positive for joint swelling. Negative for arthralgias and back pain.   Skin: Negative for color change, pallor, rash and wound.   Allergic/Immunologic: Negative for environmental allergies and immunocompromised state.   Neurological: Negative for dizziness, tremors, seizures, syncope, weakness and headaches.   Hematological: Negative for adenopathy. Bruises/bleeds easily.   Psychiatric/Behavioral: Negative for agitation and behavioral problems.   All other systems reviewed and are negative.    Objective:     Vital Signs (Most Recent):  Temp: 99.3 °F (37.4 °C) (03/25/19 1636)  Pulse: 77 (03/25/19 1832)  Resp: 18 (03/25/19 1636)  BP: 118/61 (03/25/19 1832)  SpO2: 100 % (03/25/19 1832) Vital Signs (24h Range):  Temp:  [99.3 °F (37.4 °C)] 99.3 °F (37.4 °C)  Pulse:  [77-83] 77  Resp:  [18] 18  SpO2:  [97 %-100 %] 100 %  BP: (103-118)/(57-61) 118/61     Weight: 74.8 kg (165 lb)  Body mass index is 22.38 kg/m².    Physical Exam   Constitutional: He is oriented to person, place,  and time. He appears well-developed and well-nourished. No distress.   HENT:   Head: Normocephalic and atraumatic.   Mouth/Throat: No oropharyngeal exudate.   Eyes: Scleral icterus is present.   Neck: Normal range of motion. Neck supple.   Cardiovascular: Normal rate, regular rhythm, normal heart sounds and intact distal pulses. Exam reveals no gallop and no friction rub.   No murmur heard.  Pulmonary/Chest: Effort normal and breath sounds normal. No stridor. No respiratory distress. He has no wheezes. He has no rales. He exhibits no tenderness.   Abdominal: Soft. Bowel sounds are normal. He exhibits no distension and no mass. There is no tenderness. There is no rebound and no guarding.   Healed surgical incision   Musculoskeletal: He exhibits edema and tenderness. He exhibits no deformity.   Images in media tab. Abscess on lateral knee s/p I&D with surrounding erythema, induration and very tender. Decreased ROM of left knee secondary to pain. ?small effusion present. Abscess s/p I&D on posterior calf with surrounding erythema and tenderness.    Neurological: He is alert and oriented to person, place, and time.   No asterixis   Skin: Skin is warm and dry. Capillary refill takes less than 2 seconds. Rash noted. He is not diaphoretic. There is erythema.   Psychiatric: He has a normal mood and affect. His behavior is normal.   Nursing note and vitals reviewed.       Significant Labs: All pertinent labs within the past 24 hours have been reviewed.    Significant Imaging: I have reviewed and interpreted all pertinent imaging results/findings within the past 24 hours.    Assessment/Plan:      * Cellulitis  ?secondary bacterial infection following zoster  - Start vancomycin  - Consult ortho to evaluate left knee for evidence of septic arthritis  - Continue acyclovir  - Consult wound care    Tobacco use  Nicotine supplementation  Counseled on importance of cessation      Hyponatremia  Mild - suspect secondary to  cirrhosis  - Monitor    Macrocytic anemia  Suspect secondary to liver dysfunction  - Check B12/folate      Cirrhosis  No evidence of acute decompensation at this time  - Home aldactone  - HBV immune      VTE Risk Mitigation (From admission, onward)        Ordered     enoxaparin injection 40 mg  Daily      03/25/19 1832     IP VTE HIGH RISK PATIENT  Once      03/25/19 1832              Cole Kincaid MD  Department of Hospital Medicine   [facility]

## 2019-03-26 NOTE — PLAN OF CARE
States he had shingles recently.  Now has two lesions left lateral knee and tibial area with cellulitis.  Recommend wash wounds daily with wound cleanser and cover with bordered gauze.

## 2019-03-26 NOTE — CONSULTS
Past Medical History:   Diagnosis Date    Cirrhosis     Encounter for blood transfusion     Hepatitis C     Hypertension        Past Surgical History:   Procedure Laterality Date    EXPLORATORY LAPAROTOMY  1989       Current Facility-Administered Medications   Medication    acetaminophen tablet 650 mg    acyclovir capsule 800 mg    enoxaparin injection 40 mg    furosemide tablet 40 mg    morphine injection 2 mg    nicotine 14 mg/24 hr 1 patch    ondansetron injection 4 mg    oxyCODONE immediate release tablet 5 mg    sodium chloride 0.9% flush 5 mL    sodium chloride 0.9% flush 5 mL    spironolactone tablet 100 mg    thiamine tablet 100 mg    vancomycin (VANCOCIN) 1,250 mg in dextrose 5 % 250 mL IVPB       Review of patient's allergies indicates:  No Known Allergies    Family History   Problem Relation Age of Onset    Hypertension Mother     Heart disease Mother        Social History     Socioeconomic History    Marital status: Single     Spouse name: Not on file    Number of children: Not on file    Years of education: Not on file    Highest education level: Not on file   Occupational History    Not on file   Social Needs    Financial resource strain: Not on file    Food insecurity:     Worry: Not on file     Inability: Not on file    Transportation needs:     Medical: Not on file     Non-medical: Not on file   Tobacco Use    Smoking status: Current Every Day Smoker     Packs/day: 0.50     Types: Cigarettes    Smokeless tobacco: Never Used    Tobacco comment: Patient asking for nicotine patch   Substance and Sexual Activity    Alcohol use: Yes     Alcohol/week: 1.8 oz     Types: 3 Cans of beer per week    Drug use: Not Currently    Sexual activity: Not on file   Lifestyle    Physical activity:     Days per week: Not on file     Minutes per session: Not on file    Stress: Not on file   Relationships    Social connections:     Talks on phone: Not on file     Gets together: Not on  file     Attends Taoist service: Not on file     Active member of club or organization: Not on file     Attends meetings of clubs or organizations: Not on file     Relationship status: Not on file    Intimate partner violence:     Fear of current or ex partner: Not on file     Emotionally abused: Not on file     Physically abused: Not on file     Forced sexual activity: Not on file   Other Topics Concern    Not on file   Social History Narrative    Not on file       Chief Complaint:   Chief Complaint   Patient presents with    Herpes Zoster     pain to left  knee / on antibiotics for knee infection        Consulting Physician: Self, Aaareferral    History of present illness:    This is a 58 y.o. male who complains of new onset severe left knee pain for several days. Has redness and rash with blisters on lateral side of knee that have been lanced. Denies injury to knee.    Review of Systems:    Constitution: Denies chills, fever, and sweats.  HENT: Denies headaches or blurry vision.  Cardiovascular: Denies chest pain or irregular heart beat.  Respiratory: Denies cough or shortness of breath.  Gastrointestinal: Denies abdominal pain, nausea, or vomiting.  Musculoskeletal:  Denies muscle cramps.  Neurological: Denies dizziness or focal weakness.  Psychiatric/Behavioral: Normal mental status.  Hematologic/Lymphatic: Denies bleeding problem or easy bruising/bleeding.  Skin: Denies rash or suspicious lesions.    Examination:    Vital Signs:    Vitals:    03/25/19 2105 03/25/19 2128 03/26/19 0813 03/26/19 1225   BP: 124/78 (!) 147/82 131/78 129/70   Pulse: 78 65 75 76   Resp:  18 20 18   Temp:  98.1 °F (36.7 °C) 96.2 °F (35.7 °C) 97 °F (36.1 °C)   TempSrc:  Oral     SpO2: 100% 100%  100%   Weight:  78 kg (171 lb 15.3 oz)     Height:  6' (1.829 m)         Body mass index is 23.32 kg/m².    This a well-developed, well nourished patient in no acute distress.    Alert and oriented x 3 and cooperative to examination.        Physical Exam: Left Knee Exam    Gait   Antalgic    Skin  Rash:   Large blistered rash lateral knee  Scars:   None    Inspection  Erythema:  Lateral knee approx 10x6cm  Bruising:  None  Effusion:  mild  Masses:  None  Lymphadenopathy: None    Range of Motion: 10 to 120° with pain    Medial Joint : None  Lateral Joint : None    Patellofemoral Tenderness: None  Patellofemoral Crepitus: None    Calf tenderness and mild swelling    Varus Stress:  Stable  Valgus Stress:  Stable    Strength:  4+-5/5    Pulses:  Palpable distal    Sensation:  Intact          Imaging: XR not ordered.       Assessment: Cellulitis    Other orders  -     Saline lock IV; Standing  -     Discontinue: morphine injection 8 mg  -     Basic metabolic panel; Standing  -     CBC auto differential; Standing  -     C-reactive protein; Standing  -     Provide suture tray to patient bedside; Standing  -     lidocaine-EPINEPHrine (PF) 2%-1:200,000 injection 10 mL  -     clindamycin 900 MG/50 ML D5W 900 mg/50 mL IVPB 900 mg  -     morphine injection 8 mg  -     oxyCODONE immediate release tablet 5 mg  -     spironolactone tablet 100 mg  -     Discontinue: vancomycin in dextrose 5 % 1 gram/250 mL IVPB 1,000 mg  -     Vital signs; Standing  -     Intake and output; Standing  -     Notify physician ; Standing  -     sodium chloride 0.9% flush 5 mL  -     IP VTE LOW RISK PATIENT; Standing  -     ondansetron injection 4 mg  -     Discontinue: acetaminophen tablet 650 mg  -     Full code; Standing  -     Admit to Inpatient; Standing  -     Cancel: Inpatient consult to Orthopedic Surgery; Standing  -     Inpatient consult to Orthopedic Surgery; Standing  -     Pharmacy to dose Vancomycin consult; Standing  -     Discontinue: vancomycin 1 gram in sodium chloride 500 mL flush  -     Discontinue: vancomycin (VANCOCIN) 1,122 mg in sodium chloride 0.45% IV syringe (Conc: 5 mg/ml)  -     acyclovir capsule 800 mg  -     Vital Signs; Standing  -      Notify Physician; Standing  -     sodium chloride 0.9% flush 5 mL  -     Insert saline lock; Standing  -     Cancel: IP VTE HIGH RISK PATIENT; Standing  -     Diet Low Sodium, 2gm; Standing  -     enoxaparin injection 40 mg  -     morphine injection 2 mg  -     acetaminophen tablet 650 mg  -     Comprehensive Metabolic Panel (CMP); Standing  -     Cancel: TSH; Standing  -     Cancel: Hepatitis C antibody; Standing  -     Cancel: Protime-INR; Standing  -     Cancel: APTT; Standing  -     Cancel: Hepatitis C antibody; Standing  -     Protime-INR; Standing  -     TSH; Standing  -     APTT; Standing  -     Hepatic function panel; Standing  -     Cancel: Vitamin B12; Standing  -     Cancel: Folate; Standing  -     Iron and TIBC; Standing  -     Cancel: Ferritin; Standing  -     Discontinue: vancomycin (VANCOCIN) 1,500 mg in dextrose 5 % 250 mL IVPB  -     Rapid HIV; Standing  -     INCISION AND DRAINAGE; Standing  -     Inpatient consult to Wound Care; Standing  -     Lactic acid, plasma; Standing  -     CBC auto differential; Standing  -     nicotine 14 mg/24 hr 1 patch  -     thiamine tablet 100 mg  -     VANCOMYCIN, TROUGH before 4th dose; Standing  -     Blood culture; Standing  -     vancomycin (VANCOCIN) 1,250 mg in dextrose 5 % 250 mL IVPB  -     Blood Culture #2; Standing  -     Cancel: Vitamin B12; Standing  -     Cancel: Folate; Standing  -     Cancel: Ferritin; Standing  -     Discontinue: vancomycin (VANCOCIN) 1,500 mg in dextrose 5 % 250 mL IVPB  -     Ferritin; Standing  -     Folate; Standing  -     Vitamin B12; Standing  -     Notify physician if result of follow-up lactate is greater than 4; Standing  -     Lactic acid, plasma; Standing  -     furosemide tablet 40 mg  -     Contact and airborne isolation status; Standing  -     Inpatient consult to Wound Care; Standing        Plan:  His knee does not appear septic at this time. He has minimal swelling and near full active ROM with pain not in the knee  but around the blistered rash. Will order XR and follow to see if his status changes. US for DVT negative.        DISCLAIMER: This note may have been dictated using voice recognition software and may contain grammatical errors.     NOTE: Consult report sent to referring provider via [employer].

## 2019-03-26 NOTE — ED NOTES
Patient is sitting up in bed, eating a snack. No needs or questions at this time.  A non adherent gauze has been applied to area of I&D and a blister below I&D site, both have been secured with medi pore tape. Patient tolerated well.

## 2019-03-26 NOTE — ED NOTES
Upon transfer to room 322, patient is AAOx4, no cardiac or respiratory complications. No needs or questions at this time.

## 2019-03-27 PROBLEM — B02.9 SHINGLES: Status: ACTIVE | Noted: 2019-03-27

## 2019-03-27 LAB
ALBUMIN SERPL BCP-MCNC: 2 G/DL (ref 3.5–5.2)
ALP SERPL-CCNC: 258 U/L (ref 55–135)
ALT SERPL W/O P-5'-P-CCNC: 50 U/L (ref 10–44)
ANION GAP SERPL CALC-SCNC: 1 MMOL/L (ref 8–16)
AST SERPL-CCNC: 108 U/L (ref 10–40)
BASOPHILS NFR BLD: 0 % (ref 0–1.9)
BILIRUB SERPL-MCNC: 1.2 MG/DL (ref 0.1–1)
BUN SERPL-MCNC: 12 MG/DL (ref 6–20)
CALCIUM SERPL-MCNC: 8.2 MG/DL (ref 8.7–10.5)
CHLORIDE SERPL-SCNC: 105 MMOL/L (ref 95–110)
CO2 SERPL-SCNC: 29 MMOL/L (ref 23–29)
CREAT SERPL-MCNC: 0.8 MG/DL (ref 0.5–1.4)
DIFFERENTIAL METHOD: ABNORMAL
EOSINOPHIL NFR BLD: 2 % (ref 0–8)
ERYTHROCYTE [DISTWIDTH] IN BLOOD BY AUTOMATED COUNT: 15.4 % (ref 11.5–14.5)
EST. GFR  (AFRICAN AMERICAN): >60 ML/MIN/1.73 M^2
EST. GFR  (NON AFRICAN AMERICAN): >60 ML/MIN/1.73 M^2
GLUCOSE SERPL-MCNC: 88 MG/DL (ref 70–110)
HCT VFR BLD AUTO: 37.1 % (ref 40–54)
HGB BLD-MCNC: 12.3 G/DL (ref 14–18)
LYMPHOCYTES NFR BLD: 41 % (ref 18–48)
MCH RBC QN AUTO: 37 PG (ref 27–31)
MCHC RBC AUTO-ENTMCNC: 33.3 G/DL (ref 32–36)
MCV RBC AUTO: 111 FL (ref 82–98)
MONOCYTES NFR BLD: 18 % (ref 4–15)
NEUTROPHILS NFR BLD: 37 % (ref 38–73)
NEUTS BAND NFR BLD MANUAL: 2 %
PLATELET # BLD AUTO: 107 K/UL (ref 150–350)
PLATELET BLD QL SMEAR: ABNORMAL
PMV BLD AUTO: 10.1 FL (ref 9.2–12.9)
POTASSIUM SERPL-SCNC: 3.9 MMOL/L (ref 3.5–5.1)
PROT SERPL-MCNC: 6.5 G/DL (ref 6–8.4)
RBC # BLD AUTO: 3.33 M/UL (ref 4.6–6.2)
SODIUM SERPL-SCNC: 135 MMOL/L (ref 136–145)
VANCOMYCIN TROUGH SERPL-MCNC: 13.3 UG/ML (ref 10–22)
WBC # BLD AUTO: 3.9 K/UL (ref 3.9–12.7)

## 2019-03-27 PROCEDURE — 36415 COLL VENOUS BLD VENIPUNCTURE: CPT

## 2019-03-27 PROCEDURE — 85027 COMPLETE CBC AUTOMATED: CPT

## 2019-03-27 PROCEDURE — 80053 COMPREHEN METABOLIC PANEL: CPT

## 2019-03-27 PROCEDURE — 99232 PR SUBSEQUENT HOSPITAL CARE,LEVL II: ICD-10-PCS | Mod: ,,, | Performed by: ORTHOPAEDIC SURGERY

## 2019-03-27 PROCEDURE — S4991 NICOTINE PATCH NONLEGEND: HCPCS | Performed by: INTERNAL MEDICINE

## 2019-03-27 PROCEDURE — 99232 SBSQ HOSP IP/OBS MODERATE 35: CPT | Mod: ,,, | Performed by: ORTHOPAEDIC SURGERY

## 2019-03-27 PROCEDURE — 80202 ASSAY OF VANCOMYCIN: CPT

## 2019-03-27 PROCEDURE — 85007 BL SMEAR W/DIFF WBC COUNT: CPT

## 2019-03-27 PROCEDURE — 63600175 PHARM REV CODE 636 W HCPCS: Performed by: INTERNAL MEDICINE

## 2019-03-27 PROCEDURE — 63600175 PHARM REV CODE 636 W HCPCS: Performed by: EMERGENCY MEDICINE

## 2019-03-27 PROCEDURE — 12000002 HC ACUTE/MED SURGE SEMI-PRIVATE ROOM

## 2019-03-27 PROCEDURE — 25000003 PHARM REV CODE 250: Performed by: EMERGENCY MEDICINE

## 2019-03-27 PROCEDURE — 25000003 PHARM REV CODE 250: Performed by: INTERNAL MEDICINE

## 2019-03-27 RX ADMIN — FUROSEMIDE 40 MG: 40 TABLET ORAL at 09:03

## 2019-03-27 RX ADMIN — ACYCLOVIR 800 MG: 200 CAPSULE ORAL at 05:03

## 2019-03-27 RX ADMIN — VANCOMYCIN HYDROCHLORIDE 1250 MG: 1 INJECTION, POWDER, LYOPHILIZED, FOR SOLUTION INTRAVENOUS at 10:03

## 2019-03-27 RX ADMIN — ENOXAPARIN SODIUM 40 MG: 100 INJECTION SUBCUTANEOUS at 05:03

## 2019-03-27 RX ADMIN — OXYCODONE HYDROCHLORIDE 5 MG: 5 TABLET ORAL at 10:03

## 2019-03-27 RX ADMIN — SPIRONOLACTONE 100 MG: 25 TABLET ORAL at 09:03

## 2019-03-27 RX ADMIN — NICOTINE 1 PATCH: 14 PATCH, EXTENDED RELEASE TRANSDERMAL at 09:03

## 2019-03-27 RX ADMIN — ONDANSETRON 4 MG: 2 INJECTION INTRAMUSCULAR; INTRAVENOUS at 10:03

## 2019-03-27 RX ADMIN — Medication 100 MG: at 09:03

## 2019-03-27 RX ADMIN — ACYCLOVIR 800 MG: 200 CAPSULE ORAL at 09:03

## 2019-03-27 RX ADMIN — ACYCLOVIR 800 MG: 200 CAPSULE ORAL at 06:03

## 2019-03-27 RX ADMIN — MORPHINE SULFATE 2 MG: 2 INJECTION, SOLUTION INTRAMUSCULAR; INTRAVENOUS at 06:03

## 2019-03-27 RX ADMIN — ACYCLOVIR 800 MG: 200 CAPSULE ORAL at 02:03

## 2019-03-27 RX ADMIN — ACYCLOVIR 800 MG: 200 CAPSULE ORAL at 10:03

## 2019-03-27 RX ADMIN — MORPHINE SULFATE 2 MG: 2 INJECTION, SOLUTION INTRAMUSCULAR; INTRAVENOUS at 02:03

## 2019-03-27 RX ADMIN — MORPHINE SULFATE 2 MG: 2 INJECTION, SOLUTION INTRAMUSCULAR; INTRAVENOUS at 09:03

## 2019-03-27 NOTE — PLAN OF CARE
Met with pt to complete his assessment.  Pt, lives with two friends, Antony [phone] and Art, [phone] at [id number] [place] [address].  They reportedly are trying to set up a ministry.  Pt uses a straight cane to ambulate with, denies home health services.  Pt's PCP is Dr. PAKO Garcia at [facility] and his insurance is Medicaid [employer].  Pt verified that his friends would provide transportation home for him and that he has an appointment at the clinic on 4-2-19.  I called [facility] and verified pt's PCP was dr. Garcia and his appointment was on 4-2-19 @ 3:15 p.m.   Pt's discharge disposition is home with no anticipated needs.        03/27/19 1510   Discharge Assessment   Assessment Type Discharge Planning Assessment   Confirmed/corrected address and phone number on facesheet? Yes  (correct address is [address] [place] [phone])   Assessment information obtained from? Patient   Prior to hospitilization cognitive status: Alert/Oriented   Prior to hospitalization functional status: Independent   Current cognitive status: Alert/Oriented   Current Functional Status: Independent   Lives With friend(s)  (Pt lives with two friends.)   Able to Return to Prior Arrangements yes   Is patient able to care for self after discharge? Yes   Who are your caregiver(s) and their phone number(s)?   (sister Rita Bobo, [phone])   Patient's perception of discharge disposition home or selfcare   Readmission Within the Last 30 Days previous discharge plan unsuccessful   If yes, most recent facility name:   ([facility])   Patient currently being followed by outpatient case management? No   Patient currently receives any other outside agency services? No   Equipment Currently Used at Home cane, straight   Do you have any problems affording any of your prescribed medications? No  (pharmacy is [facility])   Is the patient taking medications as prescribed? yes   Does the patient have transportation  home? Yes   Transportation Anticipated family or friend will provide   Does the patient receive services at the [facility]? No   Discharge Plan A Home   Patient/Family in Agreement with Plan yes

## 2019-03-27 NOTE — PROGRESS NOTES
Past Medical History:   Diagnosis Date    Cirrhosis     Encounter for blood transfusion     Hepatitis C     Hypertension        Past Surgical History:   Procedure Laterality Date    EXPLORATORY LAPAROTOMY  1989       Current Facility-Administered Medications   Medication    acetaminophen tablet 650 mg    acyclovir capsule 800 mg    enoxaparin injection 40 mg    furosemide tablet 40 mg    morphine injection 2 mg    nicotine 14 mg/24 hr 1 patch    ondansetron injection 4 mg    oxyCODONE immediate release tablet 5 mg    sodium chloride 0.9% flush 5 mL    sodium chloride 0.9% flush 5 mL    spironolactone tablet 100 mg    thiamine tablet 100 mg    vancomycin (VANCOCIN) 1,250 mg in dextrose 5 % 250 mL IVPB       Review of patient's allergies indicates:  No Known Allergies    Family History   Problem Relation Age of Onset    Hypertension Mother     Heart disease Mother        Social History     Socioeconomic History    Marital status: Single     Spouse name: Not on file    Number of children: Not on file    Years of education: Not on file    Highest education level: Not on file   Occupational History    Not on file   Social Needs    Financial resource strain: Not on file    Food insecurity:     Worry: Not on file     Inability: Not on file    Transportation needs:     Medical: Not on file     Non-medical: Not on file   Tobacco Use    Smoking status: Current Every Day Smoker     Packs/day: 0.50     Types: Cigarettes    Smokeless tobacco: Never Used    Tobacco comment: Patient asking for nicotine patch   Substance and Sexual Activity    Alcohol use: Yes     Alcohol/week: 1.8 oz     Types: 3 Cans of beer per week    Drug use: Not Currently    Sexual activity: Not on file   Lifestyle    Physical activity:     Days per week: Not on file     Minutes per session: Not on file    Stress: Not on file   Relationships    Social connections:     Talks on phone: Not on file     Gets together: Not on  file     Attends Bahai service: Not on file     Active member of club or organization: Not on file     Attends meetings of clubs or organizations: Not on file     Relationship status: Not on file    Intimate partner violence:     Fear of current or ex partner: Not on file     Emotionally abused: Not on file     Physically abused: Not on file     Forced sexual activity: Not on file   Other Topics Concern    Not on file   Social History Narrative    Not on file       Chief Complaint:   Chief Complaint   Patient presents with    Herpes Zoster     pain to left  knee / on antibiotics for knee infection        Consulting Physician: Self, Aaareferral    History of present illness:    This is a 58 y.o. male who complains of new onset severe left knee pain for several days. Has redness and rash with blisters on lateral side of knee that have been lanced. Denies injury to knee.    Review of Systems:    Constitution: Denies chills, fever, and sweats.  HENT: Denies headaches or blurry vision.  Cardiovascular: Denies chest pain or irregular heart beat.  Respiratory: Denies cough or shortness of breath.  Gastrointestinal: Denies abdominal pain, nausea, or vomiting.  Musculoskeletal:  Denies muscle cramps.  Neurological: Denies dizziness or focal weakness.  Psychiatric/Behavioral: Normal mental status.  Hematologic/Lymphatic: Denies bleeding problem or easy bruising/bleeding.  Skin: Denies rash or suspicious lesions.    Examination:    Vital Signs:    Vitals:    03/26/19 1908 03/26/19 2300 03/27/19 0459 03/27/19 0833   BP: 130/73 129/73  (!) 100/56   Pulse: 78 80 88 75   Resp: 17 17 16 14   Temp: 97.3 °F (36.3 °C) 97.6 °F (36.4 °C) 97.3 °F (36.3 °C) 97.2 °F (36.2 °C)   TempSrc: Oral   Oral   SpO2: 99% 99% 99% 97%   Weight:       Height:           Body mass index is 23.32 kg/m².    This a well-developed, well nourished patient in no acute distress.    Alert and oriented x 3 and cooperative to examination.       Physical  Exam: Left Knee Exam    Gait   Antalgic    Skin  Rash:   Large blistered rash lateral knee  Scars:   None    Inspection  Erythema:  Lateral knee approx 10x6cm  Bruising:  None  Effusion:  mild  Masses:  None  Lymphadenopathy: None    Range of Motion: 0 to 120°+ with minimal pain    Medial Joint : None  Lateral Joint : None    Patellofemoral Tenderness: None  Patellofemoral Crepitus: None    Calf tenderness and mild swelling    Varus Stress:  Stable  Valgus Stress:  Stable    Strength:  4+-5/5    Pulses:  Palpable distal    Sensation:  Intact          Imaging: X-ray studies were ordered and the images that were interpreted personally by me today and reviewed with the patient demonstrate normal appearing left knee.         Assessment: Cellulitis  -     X-Ray Knee 1 or 2 View Left; Standing    Other orders  -     Saline lock IV; Standing  -     Discontinue: morphine injection 8 mg  -     Basic metabolic panel; Standing  -     CBC auto differential; Standing  -     C-reactive protein; Standing  -     Provide suture tray to patient bedside; Standing  -     lidocaine-EPINEPHrine (PF) 2%-1:200,000 injection 10 mL  -     clindamycin 900 MG/50 ML D5W 900 mg/50 mL IVPB 900 mg  -     morphine injection 8 mg  -     oxyCODONE immediate release tablet 5 mg  -     spironolactone tablet 100 mg  -     Discontinue: vancomycin in dextrose 5 % 1 gram/250 mL IVPB 1,000 mg  -     Vital signs; Standing  -     Intake and output; Standing  -     Notify physician ; Standing  -     sodium chloride 0.9% flush 5 mL  -     IP VTE LOW RISK PATIENT; Standing  -     ondansetron injection 4 mg  -     Discontinue: acetaminophen tablet 650 mg  -     Full code; Standing  -     Admit to Inpatient; Standing  -     Cancel: Inpatient consult to Orthopedic Surgery; Standing  -     Inpatient consult to Orthopedic Surgery; Standing  -     Pharmacy to dose Vancomycin consult; Standing  -     Discontinue: vancomycin 1 gram in sodium  chloride 500 mL flush  -     Discontinue: vancomycin (VANCOCIN) 1,122 mg in sodium chloride 0.45% IV syringe (Conc: 5 mg/ml)  -     acyclovir capsule 800 mg  -     Vital Signs; Standing  -     Notify Physician; Standing  -     sodium chloride 0.9% flush 5 mL  -     Insert saline lock; Standing  -     Cancel: IP VTE HIGH RISK PATIENT; Standing  -     Diet Low Sodium, 2gm; Standing  -     enoxaparin injection 40 mg  -     morphine injection 2 mg  -     acetaminophen tablet 650 mg  -     Comprehensive Metabolic Panel (CMP); Standing  -     Cancel: TSH; Standing  -     Cancel: Hepatitis C antibody; Standing  -     Cancel: Protime-INR; Standing  -     Cancel: APTT; Standing  -     Cancel: Hepatitis C antibody; Standing  -     Protime-INR; Standing  -     TSH; Standing  -     APTT; Standing  -     Hepatic function panel; Standing  -     Cancel: Vitamin B12; Standing  -     Cancel: Folate; Standing  -     Iron and TIBC; Standing  -     Cancel: Ferritin; Standing  -     Discontinue: vancomycin (VANCOCIN) 1,500 mg in dextrose 5 % 250 mL IVPB  -     Rapid HIV; Standing  -     INCISION AND DRAINAGE; Standing  -     Inpatient consult to Wound Care; Standing  -     Lactic acid, plasma; Standing  -     CBC auto differential; Standing  -     nicotine 14 mg/24 hr 1 patch  -     thiamine tablet 100 mg  -     VANCOMYCIN, TROUGH before 4th dose; Standing  -     Blood culture; Standing  -     vancomycin (VANCOCIN) 1,250 mg in dextrose 5 % 250 mL IVPB  -     Blood Culture #2; Standing  -     Cancel: Vitamin B12; Standing  -     Cancel: Folate; Standing  -     Cancel: Ferritin; Standing  -     Discontinue: vancomycin (VANCOCIN) 1,500 mg in dextrose 5 % 250 mL IVPB  -     Ferritin; Standing  -     Folate; Standing  -     Vitamin B12; Standing  -     Notify physician if result of follow-up lactate is greater than 4; Standing  -     Lactic acid, plasma; Standing  -     furosemide tablet 40 mg  -     Contact and airborne isolation status;  Standing  -     Inpatient consult to Wound Care; Standing  -     VANCOMYCIN, TROUGH before 4th dose; Standing        Plan:  His pain is decreased and active ROM improved. No evidence for septic knee. No ortho intervention at this time.      DISCLAIMER: This note may have been dictated using voice recognition software and may contain grammatical errors.     NOTE: Consult report sent to referring provider via [employer].

## 2019-03-27 NOTE — SUBJECTIVE & OBJECTIVE
Interval History: No acute events overnight. Pain improved. ROM improved.     Review of Systems   Constitutional: Negative for chills and fever.   Respiratory: Negative for chest tightness, shortness of breath and wheezing.    Cardiovascular: Negative for chest pain and leg swelling.   Gastrointestinal: Negative for abdominal pain, constipation and diarrhea.   Genitourinary: Negative for dysuria and hematuria.   Skin: Negative for color change and rash.   All other systems reviewed and are negative.    Objective:     Vital Signs (Most Recent):  Temp: 97.3 °F (36.3 °C) (03/27/19 1540)  Pulse: 64 (03/27/19 1540)  Resp: 18 (03/27/19 1540)  BP: 115/61 (03/27/19 1540)  SpO2: 98 % (03/27/19 1540) Vital Signs (24h Range):  Temp:  [97.2 °F (36.2 °C)-97.6 °F (36.4 °C)] 97.3 °F (36.3 °C)  Pulse:  [64-88] 64  Resp:  [14-18] 18  SpO2:  [96 %-99 %] 98 %  BP: (100-130)/(56-73) 115/61     Weight: 78 kg (171 lb 15.3 oz)  Body mass index is 23.32 kg/m².    Intake/Output Summary (Last 24 hours) at 3/27/2019 1739  Last data filed at 3/27/2019 0600  Gross per 24 hour   Intake 970 ml   Output --   Net 970 ml      Physical Exam   Constitutional: He is oriented to person, place, and time. He appears well-developed and well-nourished. No distress.   HENT:   Head: Normocephalic and atraumatic.   Cardiovascular: Normal rate, regular rhythm, normal heart sounds and intact distal pulses.   Pulmonary/Chest: Effort normal and breath sounds normal.   Musculoskeletal: He exhibits edema and tenderness.   Improved erythema in left lower leg. Dressing in place c/d/i   Neurological: He is alert and oriented to person, place, and time.   Skin: He is not diaphoretic.       Significant Labs: All pertinent labs within the past 24 hours have been reviewed.    Significant Imaging: I have reviewed and interpreted all pertinent imaging results/findings within the past 24 hours.

## 2019-03-27 NOTE — PLAN OF CARE
Problem: Adult Inpatient Plan of Care  Goal: Plan of Care Review  Outcome: Ongoing (interventions implemented as appropriate)  Plan of care reviewed with pt, pt verbalized understanding.  PIV clean dry and intact.  IV abx infusing per order. Hourly/Q2 hourly rounds completed throughout shift. Comfort level established. Good Pain control with PRN meds. Up with assist to restroom. Urinal at bedside. Repositions self independently   Pt has remained free from fall/injury. No skin breakdown noted. Bed in lowest position, brakes locked, call light within reach, SR^x2 for pt safety. Needs attended to, will continue to monitor.

## 2019-03-27 NOTE — CONSULTS
Date: 3/27/2019   Nii Davidson [id number] is a 58 y.o. male who has been consulted for vancomycin dosing.    The patient has the following labs:     Creatinine (mg/dl)    WBC Count   Serum creatinine: 0.8 mg/dL 03/27/19 0558  Estimated creatinine clearance: 110.5 mL/min Lab Results   Component Value Date    WBC 3.90 03/27/2019            Current weight is 78 kg (171 lb 15.3 oz)      Pt is receiving vancomycin 1250 mg every 12 hours.  Vancomycin trough from 3/27/2019  at 0932  was 13.3 mg/dL.  Target trough range is 10-15 mg/dL.   Trough was drawn on time and anticipate it is  Therapeutic. Pharmacy will continue current dose.   The vancomycin trough has been ordered for 3/28 at 2130.     Patient will be followed by pharmacy for changes in renal function, toxicity, and efficacy.    Thank you for allowing us to participate in this patient's care.     Gerry Ricci, PharmD

## 2019-03-27 NOTE — PROGRESS NOTES
[facility] Medicine  Progress Note    Patient Name: Nii Davidson  MRN: [id number]  Patient Class: IP- Inpatient   Admission Date: 3/25/2019  Length of Stay: 2 days  Attending Physician: Cole Kincaid MD  Primary Care Provider: Nancy Garcia NP        Subjective:     Principal Problem:Cellulitis    HPI:  Mr. 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 Patient was in the ED 2 days ago and treated for herpes zoster with acyclovir and clinda. He reports pain, swelling, and redness only worsened. Also reports substantial knee pain and difficulty bending. He denies any alcohol intake x 10 days. No history of DT. Of note, patient reports going to [facility] recently and had an u/s of left lower extremity which was negative for DVT.     In the ED, patient was afebrile and HD stable. I&D x 3 abscesses with purulent drainage, and given clindamycin. Medicine consulted for evaluation and admission.     Hospital Course:  No notes on file    Interval History: No acute events overnight. Pain improved. ROM improved.     Review of Systems   Constitutional: Negative for chills and fever.   Respiratory: Negative for chest tightness, shortness of breath and wheezing.    Cardiovascular: Negative for chest pain and leg swelling.   Gastrointestinal: Negative for abdominal pain, constipation and diarrhea.   Genitourinary: Negative for dysuria and hematuria.   Skin: Negative for color change and rash.   All other systems reviewed and are negative.    Objective:     Vital Signs (Most Recent):  Temp: 97.3 °F (36.3 °C) (03/27/19 1540)  Pulse: 64 (03/27/19 1540)  Resp: 18 (03/27/19 1540)  BP: 115/61 (03/27/19 1540)  SpO2: 98 % (03/27/19 1540) Vital Signs (24h Range):  Temp:  [97.2 °F (36.2 °C)-97.6 °F (36.4 °C)] 97.3 °F (36.3 °C)  Pulse:  [64-88] 64  Resp:  [14-18] 18  SpO2:  [96  %-99 %] 98 %  BP: (100-130)/(56-73) 115/61     Weight: 78 kg (171 lb 15.3 oz)  Body mass index is 23.32 kg/m².    Intake/Output Summary (Last 24 hours) at 3/27/2019 1739  Last data filed at 3/27/2019 0600  Gross per 24 hour   Intake 970 ml   Output --   Net 970 ml      Physical Exam   Constitutional: He is oriented to person, place, and time. He appears well-developed and well-nourished. No distress.   HENT:   Head: Normocephalic and atraumatic.   Cardiovascular: Normal rate, regular rhythm, normal heart sounds and intact distal pulses.   Pulmonary/Chest: Effort normal and breath sounds normal.   Musculoskeletal: He exhibits edema and tenderness.   Improved erythema in left lower leg. Dressing in place c/d/i   Neurological: He is alert and oriented to person, place, and time.   Skin: He is not diaphoretic.       Significant Labs: All pertinent labs within the past 24 hours have been reviewed.    Significant Imaging: I have reviewed and interpreted all pertinent imaging results/findings within the past 24 hours.    Assessment/Plan:      * Cellulitis  ?secondary bacterial infection following zoster  - Continue vancomycin  - No evidence of septic arthritis. Appreciate ortho assistance  - Continue acyclovir  - Wound care    Shingles  Acyclovir started on 3.24 after being seen in ED  Continue for now. End date 3/31    Alcohol use  Last drink >10 days ago  No history of DT  Will give thiamine emperically    Tobacco use  Nicotine supplementation  Counseled on importance of cessation    Hyponatremia  Mild - suspect secondary to cirrhosis  - Monitor    Macrocytic anemia  Suspect secondary to liver dysfunction  - B12/folate wnl      Cirrhosis  MELD-Na 14. No evidence of acute decompensation at this time  - Home aldactone  - Lasix added on admision  - HBV immune      VTE Risk Mitigation (From admission, onward)        Ordered     IP VTE LOW RISK PATIENT  Once      03/25/19 2126     enoxaparin injection 40 mg  Daily       03/25/19 1832              Cole Kincaid MD  Department of Hospital Medicine   [facility]

## 2019-03-28 LAB
ALBUMIN SERPL BCP-MCNC: 2.3 G/DL (ref 3.5–5.2)
ALP SERPL-CCNC: 230 U/L (ref 55–135)
ALT SERPL W/O P-5'-P-CCNC: 58 U/L (ref 10–44)
ANION GAP SERPL CALC-SCNC: 5 MMOL/L (ref 8–16)
AST SERPL-CCNC: 121 U/L (ref 10–40)
BASOPHILS # BLD AUTO: 0.1 K/UL (ref 0–0.2)
BASOPHILS NFR BLD: 1.4 % (ref 0–1.9)
BILIRUB SERPL-MCNC: 1.7 MG/DL (ref 0.1–1)
BUN SERPL-MCNC: 11 MG/DL (ref 6–20)
CALCIUM SERPL-MCNC: 8.5 MG/DL (ref 8.7–10.5)
CHLORIDE SERPL-SCNC: 101 MMOL/L (ref 95–110)
CO2 SERPL-SCNC: 30 MMOL/L (ref 23–29)
CREAT SERPL-MCNC: 0.9 MG/DL (ref 0.5–1.4)
DIFFERENTIAL METHOD: ABNORMAL
EOSINOPHIL # BLD AUTO: 0.1 K/UL (ref 0–0.5)
EOSINOPHIL NFR BLD: 2 % (ref 0–8)
ERYTHROCYTE [DISTWIDTH] IN BLOOD BY AUTOMATED COUNT: 15.6 % (ref 11.5–14.5)
EST. GFR  (AFRICAN AMERICAN): >60 ML/MIN/1.73 M^2
EST. GFR  (NON AFRICAN AMERICAN): >60 ML/MIN/1.73 M^2
GLUCOSE SERPL-MCNC: 84 MG/DL (ref 70–110)
HCT VFR BLD AUTO: 40.3 % (ref 40–54)
HGB BLD-MCNC: 13.6 G/DL (ref 14–18)
LYMPHOCYTES # BLD AUTO: 1.4 K/UL (ref 1–4.8)
LYMPHOCYTES NFR BLD: 27.6 % (ref 18–48)
MCH RBC QN AUTO: 37.4 PG (ref 27–31)
MCHC RBC AUTO-ENTMCNC: 33.7 G/DL (ref 32–36)
MCV RBC AUTO: 111 FL (ref 82–98)
MONOCYTES # BLD AUTO: 0.8 K/UL (ref 0.3–1)
MONOCYTES NFR BLD: 15.6 % (ref 4–15)
NEUTROPHILS # BLD AUTO: 2.6 K/UL (ref 1.8–7.7)
NEUTROPHILS NFR BLD: 53.4 % (ref 38–73)
PLATELET # BLD AUTO: 133 K/UL (ref 150–350)
PMV BLD AUTO: 10.2 FL (ref 9.2–12.9)
POTASSIUM SERPL-SCNC: 3.9 MMOL/L (ref 3.5–5.1)
PROT SERPL-MCNC: 7.5 G/DL (ref 6–8.4)
RBC # BLD AUTO: 3.63 M/UL (ref 4.6–6.2)
SODIUM SERPL-SCNC: 136 MMOL/L (ref 136–145)
VANCOMYCIN TROUGH SERPL-MCNC: 22.6 UG/ML (ref 10–22)
WBC # BLD AUTO: 4.9 K/UL (ref 3.9–12.7)

## 2019-03-28 PROCEDURE — 63600175 PHARM REV CODE 636 W HCPCS: Performed by: INTERNAL MEDICINE

## 2019-03-28 PROCEDURE — S4991 NICOTINE PATCH NONLEGEND: HCPCS | Performed by: INTERNAL MEDICINE

## 2019-03-28 PROCEDURE — 80053 COMPREHEN METABOLIC PANEL: CPT

## 2019-03-28 PROCEDURE — 80202 ASSAY OF VANCOMYCIN: CPT

## 2019-03-28 PROCEDURE — 63600175 PHARM REV CODE 636 W HCPCS: Performed by: EMERGENCY MEDICINE

## 2019-03-28 PROCEDURE — 12000002 HC ACUTE/MED SURGE SEMI-PRIVATE ROOM

## 2019-03-28 PROCEDURE — 36415 COLL VENOUS BLD VENIPUNCTURE: CPT

## 2019-03-28 PROCEDURE — 85025 COMPLETE CBC W/AUTO DIFF WBC: CPT

## 2019-03-28 PROCEDURE — 25000003 PHARM REV CODE 250: Performed by: EMERGENCY MEDICINE

## 2019-03-28 PROCEDURE — 25000003 PHARM REV CODE 250: Performed by: INTERNAL MEDICINE

## 2019-03-28 RX ADMIN — MORPHINE SULFATE 2 MG: 2 INJECTION, SOLUTION INTRAMUSCULAR; INTRAVENOUS at 06:03

## 2019-03-28 RX ADMIN — NICOTINE 1 PATCH: 14 PATCH, EXTENDED RELEASE TRANSDERMAL at 09:03

## 2019-03-28 RX ADMIN — ACYCLOVIR 800 MG: 200 CAPSULE ORAL at 09:03

## 2019-03-28 RX ADMIN — SPIRONOLACTONE 100 MG: 25 TABLET ORAL at 09:03

## 2019-03-28 RX ADMIN — MORPHINE SULFATE 2 MG: 2 INJECTION, SOLUTION INTRAMUSCULAR; INTRAVENOUS at 09:03

## 2019-03-28 RX ADMIN — FUROSEMIDE 40 MG: 40 TABLET ORAL at 09:03

## 2019-03-28 RX ADMIN — ACYCLOVIR 800 MG: 200 CAPSULE ORAL at 06:03

## 2019-03-28 RX ADMIN — OXYCODONE HYDROCHLORIDE 5 MG: 5 TABLET ORAL at 04:03

## 2019-03-28 RX ADMIN — OXYCODONE HYDROCHLORIDE 5 MG: 5 TABLET ORAL at 10:03

## 2019-03-28 RX ADMIN — ACYCLOVIR 800 MG: 200 CAPSULE ORAL at 02:03

## 2019-03-28 RX ADMIN — ENOXAPARIN SODIUM 40 MG: 100 INJECTION SUBCUTANEOUS at 06:03

## 2019-03-28 RX ADMIN — Medication 100 MG: at 09:03

## 2019-03-28 RX ADMIN — VANCOMYCIN HYDROCHLORIDE 1250 MG: 1 INJECTION, POWDER, LYOPHILIZED, FOR SOLUTION INTRAVENOUS at 09:03

## 2019-03-28 NOTE — ASSESSMENT & PLAN NOTE
MELD-Na 14. No evidence of acute decompensation at this time  - Home aldactone  - Lasix added on admission. Will hold today with evidence of contraction alkalosis.  - HBV immune

## 2019-03-28 NOTE — ASSESSMENT & PLAN NOTE
Acyclovir started on 3.24 after being seen in ED  Continue for now. End date 3/31  Remove isolation as lesions are crusted over. Patient needs rash covered if he goes out of his room.

## 2019-03-28 NOTE — PROGRESS NOTES
[facility] Medicine  Progress Note    Patient Name: Nii Davidson  MRN: [id number]  Patient Class: IP- Inpatient   Admission Date: 3/25/2019  Length of Stay: 3 days  Attending Physician: Cole Kincaid MD  Primary Care Provider: Nancy Garcia NP        Subjective:     Principal Problem:Cellulitis    HPI:  Mr. 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 Patient was in the ED 2 days ago and treated for herpes zoster with acyclovir and clinda. He reports pain, swelling, and redness only worsened. Also reports substantial knee pain and difficulty bending. He denies any alcohol intake x 10 days. No history of DT. Of note, patient reports going to [facility] recently and had an u/s of left lower extremity which was negative for DVT.     In the ED, patient was afebrile and HD stable. I&D x 3 abscesses with purulent drainage, and given clindamycin. Medicine consulted for evaluation and admission.     Hospital Course:  No notes on file    Interval History:   No acute events overnight. Pain and swelling improved. No fevers.    Review of Systems   Constitutional: Negative for chills and fever.   Respiratory: Negative for chest tightness, shortness of breath and wheezing.    Cardiovascular: Negative for chest pain and leg swelling.   Gastrointestinal: Negative for abdominal pain, constipation and diarrhea.   Genitourinary: Negative for dysuria and hematuria.   Skin: Positive for rash. Negative for color change.   All other systems reviewed and are negative.    Objective:     Vital Signs (Most Recent):  Temp: 97.3 °F (36.3 °C) (03/27/19 2357)  Pulse: 74 (03/27/19 2357)  Resp: 18 (03/27/19 2357)  BP: (!) 148/51 (03/27/19 2357)  SpO2: 97 % (03/27/19 2357) Vital Signs (24h Range):  Temp:  [97 °F (36.1 °C)-97.3 °F (36.3 °C)] 97.3 °F (36.3 °C)  Pulse:  [64-77] 74  Resp:   [16-18] 18  SpO2:  [96 %-98 %] 97 %  BP: (109-148)/(51-61) 148/51     Weight: 78 kg (171 lb 15.3 oz)  Body mass index is 23.32 kg/m².    Intake/Output Summary (Last 24 hours) at 3/28/2019 1110  Last data filed at 3/28/2019 0600  Gross per 24 hour   Intake 1418 ml   Output 200 ml   Net 1218 ml      Physical Exam   Constitutional: He is oriented to person, place, and time. He appears well-developed and well-nourished. No distress.   HENT:   Head: Normocephalic and atraumatic.   Cardiovascular: Normal rate, regular rhythm, normal heart sounds and intact distal pulses.   Pulmonary/Chest: Effort normal and breath sounds normal.   Abdominal: Soft. Bowel sounds are normal.   Musculoskeletal: Normal range of motion. He exhibits tenderness. He exhibits no edema or deformity.   Improved erythema and swelling. Lesions have fully crusted over.    Neurological: He is alert and oriented to person, place, and time.   Skin: Skin is warm and dry. Rash noted. He is not diaphoretic. There is erythema.   Psychiatric: He has a normal mood and affect. His behavior is normal.       Significant Labs: All pertinent labs within the past 24 hours have been reviewed.    Significant Imaging: I have reviewed and interpreted all pertinent imaging results/findings within the past 24 hours.    Assessment/Plan:      * Cellulitis  ?secondary bacterial infection following zoster  - Continue vancomycin D#3  - No evidence of septic arthritis. Appreciate ortho assistance  - Continue acyclovir  - Wound care    Shingles  Acyclovir started on 3.24 after being seen in ED  Continue for now. End date 3/31  Remove isolation as lesions are crusted over. Patient needs rash covered if he goes out of his room.    Alcohol use  Last drink >10 days ago  No history of DT  Will give thiamine emperically    Tobacco use  Nicotine supplementation  Counseled on importance of cessation    Hyponatremia  Resolved- suspect secondary to cirrhosis  - Monitor    Macrocytic  anemia  Suspect secondary to liver dysfunction  - B12/folate wnl      Cirrhosis  MELD-Na 14. No evidence of acute decompensation at this time  - Home aldactone  - Lasix added on admission. Will hold today with evidence of contraction alkalosis.  - HBV immune      VTE Risk Mitigation (From admission, onward)        Ordered     IP VTE LOW RISK PATIENT  Once      03/25/19 2126     enoxaparin injection 40 mg  Daily      03/25/19 1832              Cole Kincaid MD  Department of Hospital Medicine   [facility]

## 2019-03-28 NOTE — PLAN OF CARE
Problem: Adult Inpatient Plan of Care  Goal: Plan of Care Review  Outcome: Ongoing (interventions implemented as appropriate)  Plan of care reviewed with patient. Patient verbalized understanding. Airborne isolation maintained. wound care to LLE performed, pt tolerated well, +2 edema to LLE warm to touch, bilateral pedal pulses palpable.  pain is manage with current regimen remains afebrile while receiving antibiotic therapy. safety precautions maintained. Denies c/o pain or distress at this time.

## 2019-03-28 NOTE — SUBJECTIVE & OBJECTIVE
Interval History:   No acute events overnight. Pain and swelling improved. No fevers.    Review of Systems   Constitutional: Negative for chills and fever.   Respiratory: Negative for chest tightness, shortness of breath and wheezing.    Cardiovascular: Negative for chest pain and leg swelling.   Gastrointestinal: Negative for abdominal pain, constipation and diarrhea.   Genitourinary: Negative for dysuria and hematuria.   Skin: Positive for rash. Negative for color change.   All other systems reviewed and are negative.    Objective:     Vital Signs (Most Recent):  Temp: 97.3 °F (36.3 °C) (03/27/19 2357)  Pulse: 74 (03/27/19 2357)  Resp: 18 (03/27/19 2357)  BP: (!) 148/51 (03/27/19 2357)  SpO2: 97 % (03/27/19 2357) Vital Signs (24h Range):  Temp:  [97 °F (36.1 °C)-97.3 °F (36.3 °C)] 97.3 °F (36.3 °C)  Pulse:  [64-77] 74  Resp:  [16-18] 18  SpO2:  [96 %-98 %] 97 %  BP: (109-148)/(51-61) 148/51     Weight: 78 kg (171 lb 15.3 oz)  Body mass index is 23.32 kg/m².    Intake/Output Summary (Last 24 hours) at 3/28/2019 1110  Last data filed at 3/28/2019 0600  Gross per 24 hour   Intake 1418 ml   Output 200 ml   Net 1218 ml      Physical Exam   Constitutional: He is oriented to person, place, and time. He appears well-developed and well-nourished. No distress.   HENT:   Head: Normocephalic and atraumatic.   Cardiovascular: Normal rate, regular rhythm, normal heart sounds and intact distal pulses.   Pulmonary/Chest: Effort normal and breath sounds normal.   Abdominal: Soft. Bowel sounds are normal.   Musculoskeletal: Normal range of motion. He exhibits tenderness. He exhibits no edema or deformity.   Improved erythema and swelling. Lesions have fully crusted over.    Neurological: He is alert and oriented to person, place, and time.   Skin: Skin is warm and dry. Rash noted. He is not diaphoretic. There is erythema.   Psychiatric: He has a normal mood and affect. His behavior is normal.       Significant Labs: All pertinent  labs within the past 24 hours have been reviewed.    Significant Imaging: I have reviewed and interpreted all pertinent imaging results/findings within the past 24 hours.

## 2019-03-28 NOTE — ASSESSMENT & PLAN NOTE
?secondary bacterial infection following zoster  - Continue vancomycin D#3  - No evidence of septic arthritis. Appreciate ortho assistance  - Continue acyclovir  - Wound care

## 2019-03-29 VITALS
HEIGHT: 72 IN | OXYGEN SATURATION: 98 % | BODY MASS INDEX: 23.29 KG/M2 | HEART RATE: 85 BPM | WEIGHT: 171.94 LBS | RESPIRATION RATE: 20 BRPM | TEMPERATURE: 96 F | SYSTOLIC BLOOD PRESSURE: 114 MMHG | DIASTOLIC BLOOD PRESSURE: 61 MMHG

## 2019-03-29 LAB
ALBUMIN SERPL BCP-MCNC: 2.1 G/DL (ref 3.5–5.2)
ALP SERPL-CCNC: 221 U/L (ref 55–135)
ALT SERPL W/O P-5'-P-CCNC: 52 U/L (ref 10–44)
ANION GAP SERPL CALC-SCNC: 5 MMOL/L (ref 8–16)
AST SERPL-CCNC: 111 U/L (ref 10–40)
BASOPHILS # BLD AUTO: 0 K/UL (ref 0–0.2)
BASOPHILS NFR BLD: 0.9 % (ref 0–1.9)
BILIRUB SERPL-MCNC: 1.1 MG/DL (ref 0.1–1)
BUN SERPL-MCNC: 13 MG/DL (ref 6–20)
CALCIUM SERPL-MCNC: 8.4 MG/DL (ref 8.7–10.5)
CHLORIDE SERPL-SCNC: 102 MMOL/L (ref 95–110)
CO2 SERPL-SCNC: 29 MMOL/L (ref 23–29)
CREAT SERPL-MCNC: 0.9 MG/DL (ref 0.5–1.4)
DIFFERENTIAL METHOD: ABNORMAL
EOSINOPHIL # BLD AUTO: 0.2 K/UL (ref 0–0.5)
EOSINOPHIL NFR BLD: 4 % (ref 0–8)
ERYTHROCYTE [DISTWIDTH] IN BLOOD BY AUTOMATED COUNT: 15.7 % (ref 11.5–14.5)
EST. GFR  (AFRICAN AMERICAN): >60 ML/MIN/1.73 M^2
EST. GFR  (NON AFRICAN AMERICAN): >60 ML/MIN/1.73 M^2
GLUCOSE SERPL-MCNC: 78 MG/DL (ref 70–110)
HCT VFR BLD AUTO: 36.4 % (ref 40–54)
HGB BLD-MCNC: 12.3 G/DL (ref 14–18)
LYMPHOCYTES # BLD AUTO: 1.3 K/UL (ref 1–4.8)
LYMPHOCYTES NFR BLD: 27.6 % (ref 18–48)
MCH RBC QN AUTO: 37.3 PG (ref 27–31)
MCHC RBC AUTO-ENTMCNC: 33.7 G/DL (ref 32–36)
MCV RBC AUTO: 111 FL (ref 82–98)
MONOCYTES # BLD AUTO: 0.9 K/UL (ref 0.3–1)
MONOCYTES NFR BLD: 19.8 % (ref 4–15)
NEUTROPHILS # BLD AUTO: 2.2 K/UL (ref 1.8–7.7)
NEUTROPHILS NFR BLD: 47.7 % (ref 38–73)
PLATELET # BLD AUTO: 118 K/UL (ref 150–350)
PMV BLD AUTO: 9.9 FL (ref 9.2–12.9)
POTASSIUM SERPL-SCNC: 4.2 MMOL/L (ref 3.5–5.1)
PROT SERPL-MCNC: 6.7 G/DL (ref 6–8.4)
RBC # BLD AUTO: 3.29 M/UL (ref 4.6–6.2)
SODIUM SERPL-SCNC: 136 MMOL/L (ref 136–145)
WBC # BLD AUTO: 4.7 K/UL (ref 3.9–12.7)

## 2019-03-29 PROCEDURE — 85025 COMPLETE CBC W/AUTO DIFF WBC: CPT

## 2019-03-29 PROCEDURE — 25000003 PHARM REV CODE 250: Performed by: EMERGENCY MEDICINE

## 2019-03-29 PROCEDURE — 36415 COLL VENOUS BLD VENIPUNCTURE: CPT

## 2019-03-29 PROCEDURE — 25000003 PHARM REV CODE 250: Performed by: INTERNAL MEDICINE

## 2019-03-29 PROCEDURE — 80053 COMPREHEN METABOLIC PANEL: CPT

## 2019-03-29 RX ORDER — ACYCLOVIR 800 MG/1
800 TABLET ORAL
Status: DISCONTINUED | OUTPATIENT
Start: 2019-03-29 | End: 2019-03-29 | Stop reason: HOSPADM

## 2019-03-29 RX ORDER — FUROSEMIDE 40 MG/1
40 TABLET ORAL DAILY
Qty: 30 TABLET | Refills: 11 | Status: SHIPPED | OUTPATIENT
Start: 2019-03-29 | End: 2019-10-13

## 2019-03-29 RX ORDER — IBUPROFEN 200 MG
1 TABLET ORAL DAILY
Refills: 0 | COMMUNITY
Start: 2019-03-29 | End: 2019-10-13

## 2019-03-29 RX ORDER — FUROSEMIDE 40 MG/1
40 TABLET ORAL DAILY
Status: DISCONTINUED | OUTPATIENT
Start: 2019-03-29 | End: 2019-03-29 | Stop reason: HOSPADM

## 2019-03-29 RX ORDER — SULFAMETHOXAZOLE AND TRIMETHOPRIM 800; 160 MG/1; MG/1
1 TABLET ORAL 2 TIMES DAILY
Qty: 4 TABLET | Refills: 0 | Status: SHIPPED | OUTPATIENT
Start: 2019-03-29 | End: 2019-03-31

## 2019-03-29 RX ADMIN — OXYCODONE HYDROCHLORIDE 5 MG: 5 TABLET ORAL at 04:03

## 2019-03-29 RX ADMIN — Medication 100 MG: at 09:03

## 2019-03-29 RX ADMIN — ACYCLOVIR 800 MG: 800 TABLET ORAL at 10:03

## 2019-03-29 RX ADMIN — FUROSEMIDE 40 MG: 40 TABLET ORAL at 10:03

## 2019-03-29 RX ADMIN — ACYCLOVIR 800 MG: 800 TABLET ORAL at 05:03

## 2019-03-29 RX ADMIN — SPIRONOLACTONE 100 MG: 25 TABLET ORAL at 09:03

## 2019-03-29 NOTE — PLAN OF CARE
Problem: Adult Inpatient Plan of Care  Goal: Plan of Care Review  Outcome: Ongoing (interventions implemented as appropriate)  Plan of care reviewed with patient. Patient verbalized understanding. Isolation precautions discontinued. Wound care to LLE performed, pt tolerated well, +1 edema to LLE warm to touch, lower extremities elevated.  bilateral pedal pulses palpable. Ambulates about unit with cane. Pain is manage with current regimen remains afebrile while receiving antibiotic therapy. safety precautions maintained. Denies c/o pain or distress at this time.

## 2019-03-29 NOTE — DISCHARGE INSTRUCTIONS
Start taking furosemide 40 mg daily  Have doctor check your electrolytes next week  Complete 2 days of bactrim twice per day  Keep appts  Please return for any fevers or worsening rash

## 2019-03-29 NOTE — CONSULTS
Date: 3/28/2019   Nii Davidson [id number] is a 58 y.o. male who has been consulted for vancomycin dosing.    The patient has the following labs:     Creatinine (mg/dl)    WBC Count   Serum creatinine: 0.9 mg/dL 03/28/19 0624  Estimated creatinine clearance: 98.2 mL/min Lab Results   Component Value Date    WBC 4.90 03/28/2019            Current weight is 78 kg (171 lb 15.3 oz)      Pt is receiving vancomycin 1250 mg every 12 hours.  Vancomycin trough from 3/28/2019 at 2124 was 22.6 mg/dL.  Target goal is 10-15 mg/dL.    Trough was drawn on time and anticipate it is  Supratherapeutic. Pharmacy will decrease current dose.   The patient will be changed to a vancomycin dose of 750 mg every 12 hours. The vancomycin trough has been ordered for 03/30 at 1100.  Patient will be followed by pharmacy for changes in renal function, toxicity, and efficacy.    Thank you for allowing us to participate in this patient's care.     Nicole Nesbitt, SeanD

## 2019-03-29 NOTE — PLAN OF CARE
03/29/19 1158   Final Note   Assessment Type Final Discharge Note   Anticipated Discharge Disposition Home

## 2019-03-29 NOTE — NURSING
Discharge instructions given to pt he verbalized understanding prescriptions given all questions and concerns answered. Removed piv and changed dressing. Relinquished care.

## 2019-03-29 NOTE — PLAN OF CARE
Problem: Adult Inpatient Plan of Care  Goal: Plan of Care Review  Outcome: Ongoing (interventions implemented as appropriate)  Plan of care reviewed with patient. Safety maintained throughout shift. Bed in low and locked position, side rails up x2, call light within reach, bed alarm set. Repositions self. Urinal at bedside. Up to restroom with use of cane. IV antibiotics infused per orders. Dressing to L leg changed this shift. PRN medication given for c/o pain. Hourly/q2 rounding completed for pt safety. Will continue to monitor.

## 2019-03-29 NOTE — DISCHARGE SUMMARY
[facility] Medicine  Discharge Summary      Patient Name: Nii Davidson  MRN: [id number]  Admission Date: 3/25/2019  Hospital Length of Stay: 4 days  Discharge Date and Time:  03/29/2019 6:41 PM  Attending Physician: No att. providers found   Discharging Provider: Cole Kincaid MD  Primary Care Provider: Nancy Garcia NP      HPI:   Mr. 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 Patient was in the ED 2 days ago and treated for herpes zoster with acyclovir and clinda. He reports pain, swelling, and redness only worsened. Also reports substantial knee pain and difficulty bending. He denies any alcohol intake x 10 days. No history of DT. Of note, patient reports going to [facility] recently and had an u/s of left lower extremity which was negative for DVT.     In the ED, patient was afebrile and HD stable. I&D x 3 abscesses with purulent drainage, and given clindamycin. Medicine consulted for evaluation and admission.     * No surgery found *      Hospital Course:   Patient underwent I&D of lower extremity abscesses and started on IV antibiotics. Swelling and erythema greatly improved. Lesions healed over, and he was discharged on Bactrim DS to complete 5 day course. BCx negative at discharge. He was also continued on acyclovir for recently diagnosed herpes zoster, and discharged on medication to complete his course (KRISTY 3/31). Cirrhosis managed with aldactone and started on lasix. Labs will be done by PCP next week. Patient instructed to return for fevers, worsening swelling and drainage or any other concerns. He has appt next week to est care with PCP.      Patient seen and examined on day of discharge.    Consults:   Consults (From admission, onward)        Status Ordering Provider     Inpatient consult to Orthopedic Surgery  Once     Provider:  (Not yet  assigned)    Completed MUSA SÁNCHEZ            Final Active Diagnoses:    Diagnosis Date Noted POA    PRINCIPAL PROBLEM:  Cellulitis [L03.90] 03/25/2019 Yes    Shingles [B02.9] 03/27/2019 Yes    Cirrhosis [K74.60] 03/25/2019 Yes    Macrocytic anemia [D53.9] 03/25/2019 Yes    Hyponatremia [E87.1] 03/25/2019 Yes    Tobacco use [Z72.0] 03/25/2019 Yes    Alcohol use [Z78.9] 03/25/2019 Yes      Problems Resolved During this Admission:       Discharged Condition: stable    Disposition: Home or Self Care    Follow Up:  Follow-up Information     Nancy Garcia NP On 4/2/2019.    Specialty:  Family Medicine  Why:  4-2-19 @ 3:15 p.m.   Contact information:  [address] [place] [address] [address]  [phone]                 Patient Instructions:      Notify your health care provider if you experience any of the following:  temperature >100.4     Notify your health care provider if you experience any of the following:  redness, tenderness, or signs of infection (pain, swelling, redness, odor or green/yellow discharge around incision site)     Notify your health care provider if you experience any of the following:  worsening rash     Activity as tolerated       Significant Diagnostic Studies: Labs: All labs within the past 24 hours have been reviewed    Pending Diagnostic Studies:     None         Medications:  Reconciled Home Medications:      Medication List      START taking these medications    furosemide 40 MG tablet  Commonly known as:  LASIX  Take 1 tablet (40 mg total) by mouth once daily.     nicotine 14 mg/24 hr  Commonly known as:  NICODERM CQ  Place 1 patch onto the skin once daily.     sulfamethoxazole-trimethoprim 800-160mg 800-160 mg Tab  Commonly known as:  BACTRIM DS  Take 1 tablet by mouth 2 (two) times daily. for 2 days        CONTINUE taking these medications    acyclovir 800 MG Tab  Commonly known as:  ZOVIRAX  Take 1 tablet (800 mg total) by mouth 5  (five) times daily. for 7 days     spironolactone 100 MG tablet  Commonly known as:  ALDACTONE  Take 1 tablet (100 mg total) by mouth once daily.        STOP taking these medications    clindamycin 150 MG capsule  Commonly known as:  CLEOCIN     mupirocin 2 % ointment  Commonly known as:  BACTROBAN     oxyCODONE 5 MG immediate release tablet  Commonly known as:  ROXICODONE            Indwelling Lines/Drains at time of discharge:   Lines/Drains/Airways          None          Time spent on the discharge of patient: 45 minutes  Patient was seen and examined on the date of discharge and determined to be suitable for discharge.         Cole Kincaid MD  Department of Hospital Medicine  [facility]

## 2019-03-31 LAB
BACTERIA BLD CULT: NORMAL
BACTERIA BLD CULT: NORMAL

## 2019-04-01 ENCOUNTER — TELEPHONE (OUTPATIENT)
Dept: MEDSURG UNIT | Facility: HOSPITAL | Age: 59
End: 2019-04-01

## 2019-04-01 NOTE — PHYSICIAN QUERY
PT Name: Nii Davidson  MR #: [id number]     Physician Query Form - Documentation Clarification      CDS: Danitza Mohamud RN  Contact information: [email or web address]  Upon further review with coding, this query is not needed. It is withdrawn.    This form is a permanent document in the medical record.     Query Date: April 1, 2019    By submitting this query, we are merely seeking further clarification of documentation. Please utilize your independent clinical judgment when addressing the question(s) below.    The Medical record reflects the following:    Supporting Clinical Findings Location in Medical Record    2 x 2.5 CM with fluctuant area of lateral left knee with purulent drainage, surrounding erythema and calor. There is induration, TTP and swelling to the left calf.    I & D - Incision and Drainage  Date/Time: 3/25/2019 6:49 PM  Performed by: Nik Schafer III, MD  Authorized by: Nik Schafer III, MD   Type: abscess  Body area: lower extremity  Anesthesia: local infiltration   Anesthesia:  Local Anesthetic: lidocaine 2% with epinephrine  Anesthetic total: 3 mL  Scalpel size: 11  Incision type: single straight  Complexity: simple  Drainage: serosanguinous  Drainage amount: scant  Wound treatment: incision  Comments: Three areas of the left lateral knee or incised and drained with scant exudate.  Posterior catheterization is incised with no exudate expressed.   Ed provider note 3/25                                                                       Doctor, please specify the depth of the I&D documented above.     Provider Use Only       [  ] Skin       [  ] Subcutaneous tissue/fascia       [  ] Other: __________________________                                                                                                           [  ] Clinically Undetermined

## 2019-04-16 NOTE — PROGRESS NOTES
[facility] Medicine  Progress Note    Patient Name: Nii Davidson  MRN: [id number]  Patient Class: IP- Inpatient   Admission Date: 3/25/2019  Length of Stay: 4 days  Attending Physician: No att. providers found  Primary Care Provider: Nancy Garcia NP        Subjective:     Principal Problem:Cellulitis    HPI:  Mr. 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 Patient was in the ED 2 days ago and treated for herpes zoster with acyclovir and clinda. He reports pain, swelling, and redness only worsened. Also reports substantial knee pain and difficulty bending. He denies any alcohol intake x 10 days. No history of DT. Of note, patient reports going to [facility] recently and had an u/s of left lower extremity which was negative for DVT.     In the ED, patient was afebrile and HD stable. I&D x 3 abscesses with purulent drainage, and given clindamycin. Medicine consulted for evaluation and admission.     Hospital Course:  No notes on file    Interval History: no acute events overnight. Evaluated by ortho with low suspicion for septic arthritis. Pain well controlled.     Review of Systems   Constitutional: Negative for chills and fever.   Respiratory: Negative for chest tightness, shortness of breath and wheezing.    Cardiovascular: Negative for chest pain and leg swelling.   Gastrointestinal: Negative for abdominal pain, constipation and diarrhea.   Genitourinary: Negative for dysuria and hematuria.   Musculoskeletal: Positive for arthralgias.   Skin: Positive for rash and wound. Negative for color change.   All other systems reviewed and are negative.    Objective:     Vital Signs (Most Recent):  Temp: (!) 95.9 °F (35.5 °C) (03/29/19 0808)  Pulse: 85 (03/29/19 0808)  Resp: 20 (03/29/19 0808)  BP: 114/61 (03/29/19 0808)  SpO2: 98 % (03/29/19 0808) Vital Signs (24h  Range):        Weight: 78 kg (171 lb 15.3 oz)  Body mass index is 23.32 kg/m².  No intake or output data in the 24 hours ending 04/16/19 1310   Physical Exam   Constitutional: He is oriented to person, place, and time. He appears well-developed and well-nourished. No distress.   HENT:   Head: Normocephalic and atraumatic.   Cardiovascular: Normal rate, regular rhythm, normal heart sounds and intact distal pulses.   Pulmonary/Chest: Effort normal and breath sounds normal.   Musculoskeletal: He exhibits edema and tenderness.   Improved erythema in left lower leg. Dressing in place c/d/i   Neurological: He is alert and oriented to person, place, and time.   Skin: Skin is warm and dry. Capillary refill takes less than 2 seconds. He is not diaphoretic.   Psychiatric: He has a normal mood and affect. His behavior is normal.       Significant Labs: All pertinent labs within the past 24 hours have been reviewed.    Significant Imaging: I have reviewed and interpreted all pertinent imaging results/findings within the past 24 hours.    Assessment/Plan:      * Cellulitis  ?secondary bacterial infection following zoster  - Continue vancomycin D#2  - No evidence of septic arthritis. Appreciate ortho assistance  - Continue acyclovir  - Wound care    Shingles  Acyclovir started on 3.24 after being seen in ED  Continue for now. End date 3/31  Remove isolation as lesions are crusted over. Patient needs rash covered if he goes out of his room.    Alcohol use  Last drink >10 days ago  No history of DT  Will give thiamine emperically    Tobacco use  Nicotine supplementation  Counseled on importance of cessation    Hyponatremia  Resolved- suspect secondary to cirrhosis  - Monitor    Macrocytic anemia  Suspect secondary to liver dysfunction  - B12/folate wnl      Cirrhosis  MELD-Na 14. No evidence of acute decompensation at this time  - Home aldactone. Lasix added on admission.   - HBV immune      VTE Risk Mitigation (From admission,  onward)        Ordered     IP VTE LOW RISK PATIENT  Once      03/25/19 2124          Cole Kincaid MD  Department of Hospital Medicine   [facility]

## 2019-04-16 NOTE — SUBJECTIVE & OBJECTIVE
Interval History: no acute events overnight. Evaluated by ortho with low suspicion for septic arthritis. Pain well controlled.     Review of Systems   Constitutional: Negative for chills and fever.   Respiratory: Negative for chest tightness, shortness of breath and wheezing.    Cardiovascular: Negative for chest pain and leg swelling.   Gastrointestinal: Negative for abdominal pain, constipation and diarrhea.   Genitourinary: Negative for dysuria and hematuria.   Musculoskeletal: Positive for arthralgias.   Skin: Positive for rash and wound. Negative for color change.   All other systems reviewed and are negative.    Objective:     Vital Signs (Most Recent):  Temp: (!) 95.9 °F (35.5 °C) (03/29/19 0808)  Pulse: 85 (03/29/19 0808)  Resp: 20 (03/29/19 0808)  BP: 114/61 (03/29/19 0808)  SpO2: 98 % (03/29/19 0808) Vital Signs (24h Range):        Weight: 78 kg (171 lb 15.3 oz)  Body mass index is 23.32 kg/m².  No intake or output data in the 24 hours ending 04/16/19 1310   Physical Exam   Constitutional: He is oriented to person, place, and time. He appears well-developed and well-nourished. No distress.   HENT:   Head: Normocephalic and atraumatic.   Cardiovascular: Normal rate, regular rhythm, normal heart sounds and intact distal pulses.   Pulmonary/Chest: Effort normal and breath sounds normal.   Musculoskeletal: He exhibits edema and tenderness.   Improved erythema in left lower leg. Dressing in place c/d/i   Neurological: He is alert and oriented to person, place, and time.   Skin: Skin is warm and dry. Capillary refill takes less than 2 seconds. He is not diaphoretic.   Psychiatric: He has a normal mood and affect. His behavior is normal.       Significant Labs: All pertinent labs within the past 24 hours have been reviewed.    Significant Imaging: I have reviewed and interpreted all pertinent imaging results/findings within the past 24 hours.

## 2019-04-16 NOTE — ASSESSMENT & PLAN NOTE
?secondary bacterial infection following zoster  - Continue vancomycin D#2  - No evidence of septic arthritis. Appreciate ortho assistance  - Continue acyclovir  - Wound care

## 2019-04-16 NOTE — ASSESSMENT & PLAN NOTE
MELD-Na 14. No evidence of acute decompensation at this time  - Home aldactone. Lasix added on admission.   - HBV immune

## 2019-10-13 ENCOUNTER — HOSPITAL ENCOUNTER (EMERGENCY)
Facility: HOSPITAL | Age: 59
Discharge: HOME | End: 2019-10-13
Attending: EMERGENCY MEDICINE
Payer: MEDICAID

## 2019-10-13 VITALS
SYSTOLIC BLOOD PRESSURE: 136 MMHG | HEIGHT: 72 IN | RESPIRATION RATE: 20 BRPM | OXYGEN SATURATION: 98 % | HEART RATE: 84 BPM | BODY MASS INDEX: 23.3 KG/M2 | TEMPERATURE: 97 F | DIASTOLIC BLOOD PRESSURE: 64 MMHG | WEIGHT: 172 LBS

## 2019-10-13 DIAGNOSIS — K74.60 HEPATIC CIRRHOSIS, UNSPECIFIED HEPATIC CIRRHOSIS TYPE, UNSPECIFIED WHETHER ASCITES PRESENT: ICD-10-CM

## 2019-10-13 DIAGNOSIS — S40.029A CONTUSION OF UPPER ARM, UNSPECIFIED LATERALITY, INITIAL ENCOUNTER: ICD-10-CM
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
## 2019-10-13 DIAGNOSIS — R53.1 WEAKNESS: ICD-10-CM

## 2019-10-13 DIAGNOSIS — R31.9 URINARY TRACT INFECTION WITH HEMATURIA, SITE UNSPECIFIED: Primary | ICD-10-CM

## 2019-10-13 DIAGNOSIS — N39.0 URINARY TRACT INFECTION WITH HEMATURIA, SITE UNSPECIFIED: Primary | ICD-10-CM

## 2019-10-13 LAB
ALBUMIN SERPL BCP-MCNC: 2 G/DL (ref 3.5–5.2)
ALP SERPL-CCNC: 232 U/L (ref 55–135)
ALT SERPL W/O P-5'-P-CCNC: 53 U/L (ref 10–44)
ANION GAP SERPL CALC-SCNC: 7 MMOL/L (ref 8–16)
AST SERPL-CCNC: 64 U/L (ref 10–40)
BACTERIA #/AREA URNS HPF: NORMAL /HPF
BASOPHILS # BLD AUTO: 0.02 K/UL (ref 0–0.2)
BASOPHILS NFR BLD: 0.3 % (ref 0–1.9)
BILIRUB SERPL-MCNC: 3.2 MG/DL (ref 0.1–1)
BILIRUB UR QL STRIP: ABNORMAL
BUN SERPL-MCNC: 13 MG/DL (ref 6–20)
CALCIUM SERPL-MCNC: 7.7 MG/DL (ref 8.7–10.5)
CAOX CRY URNS QL MICRO: NORMAL
CHLORIDE SERPL-SCNC: 104 MMOL/L (ref 95–110)
CK SERPL-CCNC: 66 U/L (ref 20–200)
CLARITY UR: ABNORMAL
CO2 SERPL-SCNC: 26 MMOL/L (ref 23–29)
COLOR UR: ABNORMAL
CREAT SERPL-MCNC: 0.8 MG/DL (ref 0.5–1.4)
DIFFERENTIAL METHOD: ABNORMAL
EOSINOPHIL # BLD AUTO: 0.1 K/UL (ref 0–0.5)
EOSINOPHIL NFR BLD: 2 % (ref 0–8)
ERYTHROCYTE [DISTWIDTH] IN BLOOD BY AUTOMATED COUNT: 15.3 % (ref 11.5–14.5)
EST. GFR  (AFRICAN AMERICAN): >60 ML/MIN/1.73 M^2
EST. GFR  (NON AFRICAN AMERICAN): >60 ML/MIN/1.73 M^2
GLUCOSE SERPL-MCNC: 133 MG/DL (ref 70–110)
GLUCOSE UR QL STRIP: ABNORMAL
HCT VFR BLD AUTO: 35.1 % (ref 40–54)
HGB BLD-MCNC: 12.5 G/DL (ref 14–18)
HGB UR QL STRIP: NEGATIVE
HYALINE CASTS #/AREA URNS LPF: 0 /LPF
KETONES UR QL STRIP: NEGATIVE
LEUKOCYTE ESTERASE UR QL STRIP: NEGATIVE
LYMPHOCYTES # BLD AUTO: 1.1 K/UL (ref 1–4.8)
LYMPHOCYTES NFR BLD: 16.4 % (ref 18–48)
MAGNESIUM SERPL-MCNC: 1.6 MG/DL (ref 1.6–2.6)
MCH RBC QN AUTO: 38.2 PG (ref 27–31)
MCHC RBC AUTO-ENTMCNC: 35.6 G/DL (ref 32–36)
MCV RBC AUTO: 107 FL (ref 82–98)
MICROSCOPIC COMMENT: NORMAL
MONOCYTES # BLD AUTO: 1.1 K/UL (ref 0.3–1)
MONOCYTES NFR BLD: 16.2 % (ref 4–15)
NEUTROPHILS # BLD AUTO: 4.5 K/UL (ref 1.8–7.7)
NEUTROPHILS NFR BLD: 65.1 % (ref 38–73)
NITRITE UR QL STRIP: POSITIVE
PH UR STRIP: 7 [PH] (ref 5–8)
PLATELET # BLD AUTO: 104 K/UL (ref 150–350)
PMV BLD AUTO: 11.3 FL (ref 9.2–12.9)
POTASSIUM SERPL-SCNC: 3.4 MMOL/L (ref 3.5–5.1)
PROT SERPL-MCNC: 6.5 G/DL (ref 6–8.4)
PROT UR QL STRIP: ABNORMAL
RBC # BLD AUTO: 3.27 M/UL (ref 4.6–6.2)
RBC #/AREA URNS HPF: 2 /HPF (ref 0–4)
SODIUM SERPL-SCNC: 137 MMOL/L (ref 136–145)
SP GR UR STRIP: 1.02 (ref 1–1.03)
TROPONIN I SERPL DL<=0.01 NG/ML-MCNC: 0.01 NG/ML (ref 0–0.03)
URN SPEC COLLECT METH UR: ABNORMAL
UROBILINOGEN UR STRIP-ACNC: ABNORMAL EU/DL
WBC # BLD AUTO: 6.91 K/UL (ref 3.9–12.7)
WBC #/AREA URNS HPF: 1 /HPF (ref 0–5)

## 2019-10-13 PROCEDURE — 93010 EKG 12-LEAD: ICD-10-PCS | Mod: ,,, | Performed by: INTERNAL MEDICINE

## 2019-10-13 PROCEDURE — 99285 EMERGENCY DEPT VISIT HI MDM: CPT | Mod: 25

## 2019-10-13 PROCEDURE — 84484 ASSAY OF TROPONIN QUANT: CPT

## 2019-10-13 PROCEDURE — 81000 URINALYSIS NONAUTO W/SCOPE: CPT

## 2019-10-13 PROCEDURE — 93005 ELECTROCARDIOGRAM TRACING: CPT

## 2019-10-13 PROCEDURE — 82550 ASSAY OF CK (CPK): CPT

## 2019-10-13 PROCEDURE — 63600175 PHARM REV CODE 636 W HCPCS: Performed by: EMERGENCY MEDICINE

## 2019-10-13 PROCEDURE — 96374 THER/PROPH/DIAG INJ IV PUSH: CPT

## 2019-10-13 PROCEDURE — 93010 ELECTROCARDIOGRAM REPORT: CPT | Mod: ,,, | Performed by: INTERNAL MEDICINE

## 2019-10-13 PROCEDURE — 85025 COMPLETE CBC W/AUTO DIFF WBC: CPT

## 2019-10-13 PROCEDURE — 80053 COMPREHEN METABOLIC PANEL: CPT

## 2019-10-13 PROCEDURE — 83735 ASSAY OF MAGNESIUM: CPT

## 2019-10-13 RX ORDER — SULFAMETHOXAZOLE AND TRIMETHOPRIM 800; 160 MG/1; MG/1
1 TABLET ORAL 2 TIMES DAILY
Qty: 14 TABLET | Refills: 0 | Status: SHIPPED | OUTPATIENT
Start: 2019-10-13 | End: 2019-10-20

## 2019-10-13 RX ORDER — FUROSEMIDE 10 MG/ML
40 INJECTION INTRAMUSCULAR; INTRAVENOUS
Status: COMPLETED | OUTPATIENT
Start: 2019-10-13 | End: 2019-10-13

## 2019-10-13 RX ADMIN — FUROSEMIDE 40 MG: 10 INJECTION, SOLUTION INTRAVENOUS at 09:10

## 2019-10-14 NOTE — ED NOTES
Pt reports nausea/vomiting/diarrhea x 2 days.  Pt states he is homeless and is unsure of last meal.  Last episode of vomiting reported this am before noon.  Denies blood in vomit/stool or urine.  Pt states he has had multiple episodes of urinary and fecal incontinence because he feels so weak.  Pt undressed, cleansed with cleansing wipes, gowned and awaiting MD exam.  Pt oriented with clear, coherent speech.

## 2019-10-14 NOTE — ED PROVIDER NOTES
Encounter Date: 10/13/2019    SCRIBE #1 NOTE: I, Gisel Sandra, am scribing for, and in the presence of,  Guy J. Lefort, MD. I have scribed the entire note.       History     Chief Complaint   Patient presents with    Fatigue     generalized weakness, fecal and urine incontinence for 2 days.  Patient is homeless and has sores to feet and edema.     Time seen by provider: 7:44 PM    This is a 59 y.o. male who presents with complaint of fatigue/weakness x2 days. His associated symptoms include leg swelling, L upper arm pain, and fecal/urinary incontinence. The patient denies N/V or any other concerning symptoms. He reports that his PMHx includes liver cancer. The patient is homeless and states that he has been lying out behind the store for 2 days.    The history is provided by the patient.     Review of patient's allergies indicates:  No Known Allergies  Past Medical History:   Diagnosis Date    Cirrhosis     Encounter for blood transfusion     Hepatitis C     Hypertension      Past Surgical History:   Procedure Laterality Date    EXPLORATORY LAPAROTOMY  1989     Family History   Problem Relation Age of Onset    Hypertension Mother     Heart disease Mother      Social History     Tobacco Use    Smoking status: Current Every Day Smoker     Packs/day: 0.50     Types: Cigarettes    Smokeless tobacco: Never Used    Tobacco comment: Patient asking for nicotine patch   Substance Use Topics    Alcohol use: Yes     Alcohol/week: 3.0 standard drinks     Types: 3 Cans of beer per week    Drug use: Not Currently     Review of Systems   Constitutional: Positive for fatigue. Negative for chills and fever.   HENT: Negative for sore throat.    Eyes: Negative for visual disturbance.   Respiratory: Negative for cough and shortness of breath.    Cardiovascular: Positive for leg swelling. Negative for chest pain.   Gastrointestinal: Negative for abdominal pain, nausea and vomiting.   Genitourinary: Negative for difficulty  urinating, frequency and urgency.   Musculoskeletal: Negative for back pain.   Skin: Negative for rash and wound.   Neurological: Positive for weakness. Negative for syncope.   Psychiatric/Behavioral: Negative for agitation, behavioral problems and confusion.       Physical Exam     Initial Vitals [10/13/19 1840]   BP Pulse Resp Temp SpO2   (!) 146/76 88 16 97.1 °F (36.2 °C) 98 %      MAP       --         Physical Exam    Nursing note and vitals reviewed.  Constitutional: He appears well-developed and well-nourished. He is not diaphoretic. No distress.   HENT:   Head: Normocephalic and atraumatic.   Mouth/Throat: Oropharynx is clear and moist.   Eyes: Conjunctivae and EOM are normal.   Neck: Normal range of motion. Neck supple.   Cardiovascular: Normal rate, regular rhythm and normal heart sounds. Exam reveals no gallop and no friction rub.    No murmur heard.  Pulmonary/Chest: Breath sounds normal. He has no wheezes. He has no rhonchi. He has no rales.   Abdominal: Soft. There is no tenderness. There is no rebound and no guarding.   Musculoskeletal: Normal range of motion. He exhibits no edema or tenderness.   Lymphadenopathy:     He has no cervical adenopathy.   Neurological: He is alert and oriented to person, place, and time. He has normal strength.   Skin: Skin is warm and dry. No rash noted.         ED Course   Procedures  Labs Reviewed   CBC W/ AUTO DIFFERENTIAL - Abnormal; Notable for the following components:       Result Value    RBC 3.27 (*)     Hemoglobin 12.5 (*)     Hematocrit 35.1 (*)     Mean Corpuscular Volume 107 (*)     Mean Corpuscular Hemoglobin 38.2 (*)     RDW 15.3 (*)     Platelets 104 (*)     Mono # 1.1 (*)     Lymph% 16.4 (*)     Mono% 16.2 (*)     All other components within normal limits   COMPREHENSIVE METABOLIC PANEL - Abnormal; Notable for the following components:    Potassium 3.4 (*)     Glucose 133 (*)     Calcium 7.7 (*)     Albumin 2.0 (*)     Total Bilirubin 3.2 (*)     Alkaline  Phosphatase 232 (*)     AST 64 (*)     ALT 53 (*)     Anion Gap 7 (*)     All other components within normal limits   URINALYSIS, REFLEX TO URINE CULTURE - Abnormal; Notable for the following components:    Color, UA Orange (*)     Appearance, UA Hazy (*)     Protein, UA 1+ (*)     Glucose, UA Trace (*)     Bilirubin (UA) 2+ (*)     Nitrite, UA Positive (*)     Urobilinogen, UA 4.0-6.0 (*)     All other components within normal limits    Narrative:     Preferred Collection Type->Urine, Clean Catch   CK   MAGNESIUM   TROPONIN I   TROPONIN I   MAGNESIUM   CK   URINALYSIS MICROSCOPIC    Narrative:     Preferred Collection Type->Urine, Clean Catch            X-Rays:   Independently Interpreted Readings:   Other Readings:  Reviewed by myself, read by radiology.     Imaging Results          X-Ray Humerus 2 View Right (Final result)  Result time 10/13/19 20:18:43   Procedure changed from X-Ray Humerus 2 View Left     Final result by Jamar Blanchard MD (10/13/19 20:18:43)                 Impression:      No acute displaced fracture-dislocation identified.      Electronically signed by: Jamar Blanchard MD  Date:    10/13/2019  Time:    20:18             Narrative:    EXAMINATION:  XR HUMERUS 2 VIEW RIGHT    CLINICAL HISTORY:  fall;  Unspecified fall, initial encounter    TECHNIQUE:  AP and lateral views right humerus    COMPARISON:  Chest radiograph 03/15/2019    FINDINGS:  Bones are well mineralized. Overall alignment is within normal limits. No displaced fracture, dislocation or destructive osseous process.  Mild degenerative change at the AC joint and inferior glenoid.  No subcutaneous emphysema or radiodense retained foreign body.                               X-Ray Chest AP Portable (Final result)  Result time 10/13/19 20:18:46    Final result by Sravanthi Mai MD (10/13/19 20:18:46)                 Impression:      Nonspecific increased interstitial attenuation.  Findings may reflect mild pulmonary interstitial edema  or can be seen in the setting of atypical pneumonia.  No focal consolidation seen.      Electronically signed by: Sravanthi Mai MD  Date:    10/13/2019  Time:    20:18             Narrative:    EXAMINATION:  XR CHEST AP PORTABLE    CLINICAL HISTORY:  Weakness    TECHNIQUE:  Single frontal view of the chest was performed.    COMPARISON:  03/15/2019.    FINDINGS:  Cardiac silhouette is normal in size.  Lungs are symmetrically expanded.  There is nonspecific diffuse increased interstitial attenuation, more pronounced within the left lung.  No evidence of focal consolidative process, pneumothorax, or significant effusion.  No acute osseous abnormality identified.                              Medical Decision Making:   Differential Diagnosis:   Differential Diagnosis includes, but is not limited to:  CVA/TIA, vertigo, anemia/blood loss, cardiogenic shock, arrhythmia, orthostatic hypotension, dehydration, medication side effect, vitamin deficiency, liver disease, hypothyroidism, drug intoxication/withdrawal, metabolic derangement.    Independently Interpreted Test(s):   I have ordered and independently interpreted X-rays - see prior notes.  I have ordered and independently interpreted EKG Reading(s) - see prior notes  Clinical Tests:   Lab Tests: Reviewed and Ordered  Radiological Study: Ordered and Reviewed  Medical Tests: Reviewed and Ordered  ED Management:  Pt is not incontinent of stool or urine.  No FND.  Ambulated and ate in department, dressed himself and discharged with steady gait.  Will rx for UTI, otherwise No emergent conditions identifed, patient given outpatient resources and referral to CM made.  Discussed return precautions.                      Clinical Impression:       ICD-10-CM ICD-9-CM   1. Urinary tract infection with hematuria, site unspecified N39.0 599.0    R31.9 599.70   2. Weakness R53.1 780.79   3. Fall W19.XXXA E888.9   4. Contusion of upper arm, unspecified laterality, initial encounter  S40.029A 923.03   5. Hepatic cirrhosis, unspecified hepatic cirrhosis type, unspecified whether ascites present K74.60 571.5         Disposition:   Disposition: Discharged  Condition: Stable             Scribe Attestation I, Dr. Guy Lefort, personally performed the services described in this documentation. All medical record entries made by the scribe were at my direction and in my presence. I have reviewed the chart and agree that the record reflects my personal performance and is accurate and complete. Guy Lefort, MD.  8:08 PM 10/15/2019               Guy J. Lefort, MD  10/15/19 2009

## 2020-07-17 ENCOUNTER — LAB VISIT (OUTPATIENT)
Dept: LAB | Facility: OTHER | Age: 60
End: 2020-07-17
Attending: INTERNAL MEDICINE
Payer: MEDICAID

## 2020-07-17 DIAGNOSIS — B18.2 CHRONIC HEPATITIS C WITH HEPATIC COMA: ICD-10-CM

## 2020-07-17 DIAGNOSIS — K74.60 CIRRHOSIS: ICD-10-CM

## 2020-07-17 DIAGNOSIS — R16.0 HEPATOMEGALY: Primary | ICD-10-CM

## 2020-07-17 LAB
AFP SERPL-MCNC: 63 NG/ML (ref 0–8.4)
ALBUMIN SERPL BCP-MCNC: 2.5 G/DL (ref 3.5–5.2)
ALP SERPL-CCNC: 157 U/L (ref 55–135)
ALT SERPL W/O P-5'-P-CCNC: 47 U/L (ref 10–44)
ANION GAP SERPL CALC-SCNC: 9 MMOL/L (ref 8–16)
APTT BLDCRRT: 34 SEC (ref 21–32)
AST SERPL-CCNC: 82 U/L (ref 10–40)
BASOPHILS # BLD AUTO: 0.05 K/UL (ref 0–0.2)
BASOPHILS NFR BLD: 0.8 % (ref 0–1.9)
BILIRUB SERPL-MCNC: 2.5 MG/DL (ref 0.1–1)
BUN SERPL-MCNC: 10 MG/DL (ref 6–20)
CALCIUM SERPL-MCNC: 8.4 MG/DL (ref 8.7–10.5)
CHLORIDE SERPL-SCNC: 108 MMOL/L (ref 95–110)
CO2 SERPL-SCNC: 20 MMOL/L (ref 23–29)
CREAT SERPL-MCNC: 1.1 MG/DL (ref 0.5–1.4)
DIFFERENTIAL METHOD: ABNORMAL
EOSINOPHIL # BLD AUTO: 0.1 K/UL (ref 0–0.5)
EOSINOPHIL NFR BLD: 1.7 % (ref 0–8)
ERYTHROCYTE [DISTWIDTH] IN BLOOD BY AUTOMATED COUNT: 15.3 % (ref 11.5–14.5)
EST. GFR  (AFRICAN AMERICAN): >60 ML/MIN/1.73 M^2
EST. GFR  (NON AFRICAN AMERICAN): >60 ML/MIN/1.73 M^2
GLUCOSE SERPL-MCNC: 47 MG/DL (ref 70–110)
HCT VFR BLD AUTO: 37.1 % (ref 40–54)
HGB BLD-MCNC: 12.6 G/DL (ref 14–18)
IMM GRANULOCYTES # BLD AUTO: 0.03 K/UL (ref 0–0.04)
IMM GRANULOCYTES NFR BLD AUTO: 0.5 % (ref 0–0.5)
INR PPP: 1.3 (ref 0.8–1.2)
LYMPHOCYTES # BLD AUTO: 1.3 K/UL (ref 1–4.8)
LYMPHOCYTES NFR BLD: 19.9 % (ref 18–48)
MCH RBC QN AUTO: 38.4 PG (ref 27–31)
MCHC RBC AUTO-ENTMCNC: 34 G/DL (ref 32–36)
MCV RBC AUTO: 113 FL (ref 82–98)
MONOCYTES # BLD AUTO: 1.3 K/UL (ref 0.3–1)
MONOCYTES NFR BLD: 19 % (ref 4–15)
NEUTROPHILS # BLD AUTO: 3.8 K/UL (ref 1.8–7.7)
NEUTROPHILS NFR BLD: 58.1 % (ref 38–73)
NRBC BLD-RTO: 0 /100 WBC
PLATELET # BLD AUTO: 128 K/UL (ref 150–350)
PLATELET # BLD AUTO: 128 K/UL (ref 150–350)
PMV BLD AUTO: 12.3 FL (ref 9.2–12.9)
PMV BLD AUTO: 12.3 FL (ref 9.2–12.9)
POTASSIUM SERPL-SCNC: 4 MMOL/L (ref 3.5–5.1)
PROT SERPL-MCNC: 7.7 G/DL (ref 6–8.4)
PROTHROMBIN TIME: 13.8 SEC (ref 9–12.5)
RBC # BLD AUTO: 3.28 M/UL (ref 4.6–6.2)
SODIUM SERPL-SCNC: 137 MMOL/L (ref 136–145)
WBC # BLD AUTO: 6.57 K/UL (ref 3.9–12.7)

## 2020-07-17 PROCEDURE — 85610 PROTHROMBIN TIME: CPT

## 2020-07-17 PROCEDURE — 85730 THROMBOPLASTIN TIME PARTIAL: CPT

## 2020-07-17 PROCEDURE — 85025 COMPLETE CBC W/AUTO DIFF WBC: CPT

## 2020-07-17 PROCEDURE — 82105 ALPHA-FETOPROTEIN SERUM: CPT

## 2020-07-17 PROCEDURE — 80053 COMPREHEN METABOLIC PANEL: CPT

## 2020-07-17 PROCEDURE — 36415 COLL VENOUS BLD VENIPUNCTURE: CPT

## 2020-08-28 ENCOUNTER — HOSPITAL ENCOUNTER (INPATIENT)
Facility: HOSPITAL | Age: 60
LOS: 5 days | Discharge: HOME | DRG: 432 | End: 2020-09-02
Attending: EMERGENCY MEDICINE | Admitting: EMERGENCY MEDICINE
Payer: MEDICAID

## 2020-08-28 DIAGNOSIS — K70.30 ALCOHOLIC CIRRHOSIS OF LIVER WITHOUT ASCITES: ICD-10-CM

## 2020-08-28 DIAGNOSIS — D72.829 LEUKOCYTOSIS, UNSPECIFIED TYPE: ICD-10-CM

## 2020-08-28 DIAGNOSIS — D69.6 THROMBOCYTOPENIA: ICD-10-CM

## 2020-08-28 DIAGNOSIS — C78.00 HEPATOCELLULAR CARCINOMA METASTATIC TO LUNG, UNSPECIFIED LATERALITY: ICD-10-CM

## 2020-08-28 DIAGNOSIS — N17.9 AKI (ACUTE KIDNEY INJURY): ICD-10-CM

## 2020-08-28 DIAGNOSIS — B19.20 DECOMPENSATED CIRRHOSIS RELATED TO HEPATITIS C VIRUS (HCV): ICD-10-CM

## 2020-08-28 DIAGNOSIS — Z71.89 ADVANCED CARE PLANNING/COUNSELING DISCUSSION: ICD-10-CM

## 2020-08-28 DIAGNOSIS — C22.0 HEPATOCELLULAR CARCINOMA METASTATIC TO LUNG, UNSPECIFIED LATERALITY: ICD-10-CM

## 2020-08-28 DIAGNOSIS — R00.0 TACHYCARDIA: ICD-10-CM

## 2020-08-28 DIAGNOSIS — K74.60 CIRRHOSIS OF LIVER: ICD-10-CM

## 2020-08-28 DIAGNOSIS — K74.69 DECOMPENSATED CIRRHOSIS RELATED TO HEPATITIS C VIRUS (HCV): ICD-10-CM

## 2020-08-28 DIAGNOSIS — Z71.89 GOALS OF CARE, COUNSELING/DISCUSSION: ICD-10-CM

## 2020-08-28 DIAGNOSIS — R06.00 DYSPNEA, UNSPECIFIED TYPE: ICD-10-CM

## 2020-08-28 DIAGNOSIS — E80.6 HYPERBILIRUBINEMIA: ICD-10-CM

## 2020-08-28 DIAGNOSIS — K76.82 HEPATIC ENCEPHALOPATHY: Primary | ICD-10-CM

## 2020-08-28 DIAGNOSIS — F10.20 ALCOHOL USE DISORDER, SEVERE, DEPENDENCE: Chronic | ICD-10-CM

## 2020-08-28 DIAGNOSIS — Z51.5 PALLIATIVE CARE ENCOUNTER: ICD-10-CM

## 2020-08-28 PROBLEM — R41.82 ALTERED MENTAL STATUS: Status: ACTIVE | Noted: 2020-08-28

## 2020-08-28 LAB
ALBUMIN SERPL BCP-MCNC: 2.4 G/DL (ref 3.5–5.2)
ALP SERPL-CCNC: 63 U/L (ref 55–135)
ALT SERPL W/O P-5'-P-CCNC: 47 U/L (ref 10–44)
AMMONIA PLAS-SCNC: 155 UMOL/L (ref 10–50)
AMPHET+METHAMPHET UR QL: NEGATIVE
ANION GAP SERPL CALC-SCNC: 10 MMOL/L (ref 8–16)
ANISOCYTOSIS BLD QL SMEAR: SLIGHT
AST SERPL-CCNC: 125 U/L (ref 10–40)
BARBITURATES UR QL SCN>200 NG/ML: NEGATIVE
BASO STIPL BLD QL SMEAR: ABNORMAL
BASOPHILS # BLD AUTO: 0.04 K/UL (ref 0–0.2)
BASOPHILS NFR BLD: 0.3 % (ref 0–1.9)
BENZODIAZ UR QL SCN>200 NG/ML: NEGATIVE
BILIRUB SERPL-MCNC: 3.4 MG/DL (ref 0.1–1)
BUN SERPL-MCNC: 24 MG/DL (ref 6–20)
BZE UR QL SCN: NEGATIVE
CALCIUM SERPL-MCNC: 8.1 MG/DL (ref 8.7–10.5)
CANNABINOIDS UR QL SCN: NEGATIVE
CHLORIDE SERPL-SCNC: 103 MMOL/L (ref 95–110)
CO2 SERPL-SCNC: 19 MMOL/L (ref 23–29)
CREAT SERPL-MCNC: 2.3 MG/DL (ref 0.5–1.4)
CREAT UR-MCNC: 294 MG/DL (ref 23–375)
CREAT UR-MCNC: 294 MG/DL (ref 23–375)
DIFFERENTIAL METHOD: ABNORMAL
EOSINOPHIL # BLD AUTO: 0 K/UL (ref 0–0.5)
EOSINOPHIL NFR BLD: 0.1 % (ref 0–8)
ERYTHROCYTE [DISTWIDTH] IN BLOOD BY AUTOMATED COUNT: 14.2 % (ref 11.5–14.5)
EST. GFR  (AFRICAN AMERICAN): 34.6 ML/MIN/1.73 M^2
EST. GFR  (NON AFRICAN AMERICAN): 30 ML/MIN/1.73 M^2
ETHANOL SERPL-MCNC: <10 MG/DL
ETHANOL UR-MCNC: <10 MG/DL
GLUCOSE SERPL-MCNC: 66 MG/DL (ref 70–110)
HCT VFR BLD AUTO: 33.6 % (ref 40–54)
HGB BLD-MCNC: 11.7 G/DL (ref 14–18)
IMM GRANULOCYTES # BLD AUTO: 0.12 K/UL (ref 0–0.04)
IMM GRANULOCYTES NFR BLD AUTO: 0.8 % (ref 0–0.5)
INR PPP: 1.9 (ref 0.8–1.2)
LYMPHOCYTES # BLD AUTO: 0.4 K/UL (ref 1–4.8)
LYMPHOCYTES NFR BLD: 3 % (ref 18–48)
MCH RBC QN AUTO: 37.3 PG (ref 27–31)
MCHC RBC AUTO-ENTMCNC: 34.8 G/DL (ref 32–36)
MCV RBC AUTO: 107 FL (ref 82–98)
METHADONE UR QL SCN>300 NG/ML: NEGATIVE
MONOCYTES # BLD AUTO: 1.1 K/UL (ref 0.3–1)
MONOCYTES NFR BLD: 7.8 % (ref 4–15)
NEUTROPHILS # BLD AUTO: 12.6 K/UL (ref 1.8–7.7)
NEUTROPHILS NFR BLD: 88 % (ref 38–73)
NRBC BLD-RTO: 0 /100 WBC
OPIATES UR QL SCN: NORMAL
OSMOLALITY UR: 446 MOSM/KG (ref 50–1200)
PCP UR QL SCN>25 NG/ML: NEGATIVE
PLATELET # BLD AUTO: 86 K/UL (ref 150–350)
PLATELET BLD QL SMEAR: ABNORMAL
PMV BLD AUTO: 10.5 FL (ref 9.2–12.9)
POCT GLUCOSE: 71 MG/DL (ref 70–110)
POLYCHROMASIA BLD QL SMEAR: ABNORMAL
POTASSIUM SERPL-SCNC: 4.1 MMOL/L (ref 3.5–5.1)
PROT SERPL-MCNC: 7.2 G/DL (ref 6–8.4)
PROTHROMBIN TIME: 20.3 SEC (ref 9–12.5)
RBC # BLD AUTO: 3.14 M/UL (ref 4.6–6.2)
SARS-COV-2 RDRP RESP QL NAA+PROBE: NEGATIVE
SODIUM SERPL-SCNC: 132 MMOL/L (ref 136–145)
SODIUM UR-SCNC: <20 MMOL/L (ref 20–250)
TOXICOLOGY INFORMATION: NORMAL
UUN UR-MCNC: 471 MG/DL (ref 140–1050)
WBC # BLD AUTO: 14.3 K/UL (ref 3.9–12.7)
WBC TOXIC VACUOLES BLD QL SMEAR: PRESENT

## 2020-08-28 PROCEDURE — 99291 PR CRITICAL CARE, E/M 30-74 MINUTES: ICD-10-PCS | Mod: ,,, | Performed by: EMERGENCY MEDICINE

## 2020-08-28 PROCEDURE — 25000003 PHARM REV CODE 250: Performed by: INTERNAL MEDICINE

## 2020-08-28 PROCEDURE — 83935 ASSAY OF URINE OSMOLALITY: CPT

## 2020-08-28 PROCEDURE — 86235 NUCLEAR ANTIGEN ANTIBODY: CPT | Mod: 59

## 2020-08-28 PROCEDURE — 80307 DRUG TEST PRSMV CHEM ANLYZR: CPT

## 2020-08-28 PROCEDURE — U0002 COVID-19 LAB TEST NON-CDC: HCPCS

## 2020-08-28 PROCEDURE — 80321 ALCOHOLS BIOMARKERS 1OR 2: CPT

## 2020-08-28 PROCEDURE — 25000003 PHARM REV CODE 250: Performed by: EMERGENCY MEDICINE

## 2020-08-28 PROCEDURE — 11000001 HC ACUTE MED/SURG PRIVATE ROOM

## 2020-08-28 PROCEDURE — 84540 ASSAY OF URINE/UREA-N: CPT

## 2020-08-28 PROCEDURE — 85610 PROTHROMBIN TIME: CPT

## 2020-08-28 PROCEDURE — 93010 ELECTROCARDIOGRAM REPORT: CPT | Mod: ,,, | Performed by: INTERNAL MEDICINE

## 2020-08-28 PROCEDURE — 82105 ALPHA-FETOPROTEIN SERUM: CPT

## 2020-08-28 PROCEDURE — 85025 COMPLETE CBC W/AUTO DIFF WBC: CPT

## 2020-08-28 PROCEDURE — 87522 HEPATITIS C REVRS TRNSCRPJ: CPT

## 2020-08-28 PROCEDURE — 82962 GLUCOSE BLOOD TEST: CPT

## 2020-08-28 PROCEDURE — 87040 BLOOD CULTURE FOR BACTERIA: CPT

## 2020-08-28 PROCEDURE — 80053 COMPREHEN METABOLIC PANEL: CPT

## 2020-08-28 PROCEDURE — 86038 ANTINUCLEAR ANTIBODIES: CPT

## 2020-08-28 PROCEDURE — 86703 HIV-1/HIV-2 1 RESULT ANTBDY: CPT

## 2020-08-28 PROCEDURE — 80074 ACUTE HEPATITIS PANEL: CPT

## 2020-08-28 PROCEDURE — 82140 ASSAY OF AMMONIA: CPT

## 2020-08-28 PROCEDURE — 99291 CRITICAL CARE FIRST HOUR: CPT | Mod: ,,, | Performed by: EMERGENCY MEDICINE

## 2020-08-28 PROCEDURE — 93005 ELECTROCARDIOGRAM TRACING: CPT

## 2020-08-28 PROCEDURE — 93010 EKG 12-LEAD: ICD-10-PCS | Mod: ,,, | Performed by: INTERNAL MEDICINE

## 2020-08-28 PROCEDURE — 80320 DRUG SCREEN QUANTALCOHOLS: CPT

## 2020-08-28 PROCEDURE — 63600175 PHARM REV CODE 636 W HCPCS: Performed by: INTERNAL MEDICINE

## 2020-08-28 PROCEDURE — 99291 CRITICAL CARE FIRST HOUR: CPT | Mod: 25

## 2020-08-28 PROCEDURE — 36415 COLL VENOUS BLD VENIPUNCTURE: CPT

## 2020-08-28 PROCEDURE — 84300 ASSAY OF URINE SODIUM: CPT

## 2020-08-28 PROCEDURE — 86039 ANTINUCLEAR ANTIBODIES (ANA): CPT

## 2020-08-28 RX ORDER — LACTULOSE 10 G/15ML
30 SOLUTION ORAL
Status: COMPLETED | OUTPATIENT
Start: 2020-08-28 | End: 2020-08-28

## 2020-08-28 RX ORDER — LORAZEPAM 1 MG/1
2 TABLET ORAL
Status: DISCONTINUED | OUTPATIENT
Start: 2020-08-28 | End: 2020-09-01

## 2020-08-28 RX ORDER — SODIUM CHLORIDE 0.9 % (FLUSH) 0.9 %
10 SYRINGE (ML) INJECTION
Status: DISCONTINUED | OUTPATIENT
Start: 2020-08-28 | End: 2020-09-02 | Stop reason: HOSPADM

## 2020-08-28 RX ORDER — IBUPROFEN 200 MG
24 TABLET ORAL
Status: DISCONTINUED | OUTPATIENT
Start: 2020-08-28 | End: 2020-09-02 | Stop reason: HOSPADM

## 2020-08-28 RX ORDER — GLUCAGON 1 MG
1 KIT INJECTION
Status: DISCONTINUED | OUTPATIENT
Start: 2020-08-28 | End: 2020-09-02 | Stop reason: HOSPADM

## 2020-08-28 RX ORDER — LACTULOSE 10 G/15ML
20 SOLUTION ORAL 3 TIMES DAILY
Status: DISCONTINUED | OUTPATIENT
Start: 2020-08-29 | End: 2020-08-30

## 2020-08-28 RX ORDER — IBUPROFEN 200 MG
16 TABLET ORAL
Status: DISCONTINUED | OUTPATIENT
Start: 2020-08-28 | End: 2020-09-02 | Stop reason: HOSPADM

## 2020-08-28 RX ADMIN — LACTULOSE 30 G: 20 SOLUTION ORAL at 04:08

## 2020-08-28 RX ADMIN — VANCOMYCIN HYDROCHLORIDE 2000 MG: 100 INJECTION, POWDER, LYOPHILIZED, FOR SOLUTION INTRAVENOUS at 10:08

## 2020-08-28 NOTE — ED NOTES
Pt ambulatory to the restroom accompanied by nurse; slightly unsteady gait noted.  Pt returned to room 23 without incident; replaced on continuous cardiac and pulse ox monitoring with non-invasion blood pressure to cycle every 30 minutes.  Bed locked in lowest position; side rails up and locked x 2; call light, bedside table, and personal belongings within reach. Pt instructed to alert nurse for assistance and before attempting to get out of bed; verbalizes understanding  Will continue to monitor pt.

## 2020-08-28 NOTE — ED NOTES
Pt placed on continuous cardiac and pulse ox monitoring with blood pressure to cycle every 30 minutes.  VS stable; NSR noted.  Bed locked in lowest position; side rails up and locked x 2; call light, bedside table, and personal belongings within reach.  Pt instructed to alert nurse for assistance before attempting to get out of bed; verbalizes understanding; will continue to monitor pt.

## 2020-08-28 NOTE — ED TRIAGE NOTES
Pt to the ER per EMS with complaints of confusion and tremors. Pt was staying in a hotel when the person delivering his meals found him confused with tremors. Pt with a hx alcohol and substance abuse, in remission. EMS reports pt developed pinpoint pupils with decreased respirations and a drop in his oxygen saturation. Pt received 0.5mg of Narcan with response. Pt reports taking Hydrocodone for pain today.

## 2020-08-28 NOTE — ED PROVIDER NOTES
"Encounter Date: 8/28/2020       History     Chief Complaint   Patient presents with    Addiction Problem     EMS was contacted due to patient being confused, upon EMS arrival patient with pinpoint pupils and was with apneic respirations, patient received .5mg Narcan and became more repsonsive. Hx opioid use.      HPI   Mr. Davidson is a 59 y.o. male with hepC cirrhosis, self reported "liver cancer," h/o opiate abuse, HTN here today with altered mental status.  Brought in by EMS.  Patient is slightly confused which limits history.  EMS reports that they were called for welfare check after food delivery service found patient confused and shaking. Per EMS, he was confused, somnolent with bradypnea. They gave 0.5 mg narcan which improved RR and mental status some. When asked about fevers, he states he has had some over the past day, but cannot elaborate. Endorses taking some hydrocodone this morning for "pain."     Review of patient's allergies indicates:  No Known Allergies  Past Medical History:   Diagnosis Date    Cirrhosis     Encounter for blood transfusion     Hepatitis C     Hypertension      Past Surgical History:   Procedure Laterality Date    EXPLORATORY LAPAROTOMY  1989     Family History   Problem Relation Age of Onset    Hypertension Mother     Heart disease Mother      Social History     Tobacco Use    Smoking status: Current Every Day Smoker     Packs/day: 0.50     Types: Cigarettes    Smokeless tobacco: Never Used    Tobacco comment: Patient asking for nicotine patch   Substance Use Topics    Alcohol use: Yes     Alcohol/week: 3.0 standard drinks     Types: 3 Cans of beer per week    Drug use: Not Currently     Review of Systems   Unable to perform ROS: Mental status change       Physical Exam     Initial Vitals [08/28/20 1530]   BP Pulse Resp Temp SpO2   126/70 108 12 98.2 °F (36.8 °C) 97 %      MAP       --         Physical Exam    Nursing note and vitals reviewed.  Constitutional: He " appears well-developed and well-nourished. He appears listless. He is not diaphoretic. No distress.   HENT:   Head: Normocephalic and atraumatic.   Right Ear: External ear normal.   Left Ear: External ear normal.   Eyes: Pupils are equal, round, and reactive to light.   Pupils 3 mm and reactive   Neck: Neck supple.   Cardiovascular: Regular rhythm, normal heart sounds and intact distal pulses.   Mildly tachycardic   Pulmonary/Chest: Breath sounds normal. No respiratory distress. He has no wheezes. He has no rhonchi. He has no rales.   Abdominal: Soft. He exhibits distension (mild). There is no abdominal tenderness. There is no rebound and no guarding.   Prior surgical scar present   Neurological: He appears listless. He is disoriented. GCS eye subscore is 4. GCS verbal subscore is 4. GCS motor subscore is 6.   Baseline tremor and nystagmus present   Skin: Skin is warm. Capillary refill takes less than 2 seconds. No rash noted.   Psychiatric: He has a normal mood and affect.         ED Course   Procedures  Labs Reviewed   CBC W/ AUTO DIFFERENTIAL - Abnormal; Notable for the following components:       Result Value    WBC 14.30 (*)     RBC 3.14 (*)     Hemoglobin 11.7 (*)     Hematocrit 33.6 (*)     Mean Corpuscular Volume 107 (*)     Mean Corpuscular Hemoglobin 37.3 (*)     Platelets 86 (*)     Immature Granulocytes 0.8 (*)     Gran # (ANC) 12.6 (*)     Immature Grans (Abs) 0.12 (*)     Lymph # 0.4 (*)     Mono # 1.1 (*)     Gran% 88.0 (*)     Lymph% 3.0 (*)     Platelet Estimate Decreased (*)     All other components within normal limits   COMPREHENSIVE METABOLIC PANEL - Abnormal; Notable for the following components:    Sodium 132 (*)     CO2 19 (*)     Glucose 66 (*)     BUN, Bld 24 (*)     Creatinine 2.3 (*)     Calcium 8.1 (*)     Albumin 2.4 (*)     Total Bilirubin 3.4 (*)      (*)     ALT 47 (*)     eGFR if  34.6 (*)     eGFR if non  30.0 (*)     All other components  within normal limits   PROTIME-INR - Abnormal; Notable for the following components:    Prothrombin Time 20.3 (*)     INR 1.9 (*)     All other components within normal limits   AMMONIA - Abnormal; Notable for the following components:    Ammonia 155 (*)     All other components within normal limits   ALCOHOL,MEDICAL (ETHANOL)   SARS-COV-2 RNA AMPLIFICATION, QUAL   URINALYSIS, REFLEX TO URINE CULTURE   POCT GLUCOSE          Imaging Results          CT Head Without Contrast (Final result)  Result time 08/28/20 21:46:48    Final result by Josias Han MD (08/28/20 21:46:48)                 Impression:      No acute intracranial abnormalities.    Raoul cisterna magna.  Senescent changes.      Electronically signed by: Josias Han MD  Date:    08/28/2020  Time:    21:46             Narrative:    EXAMINATION:  CT HEAD WITHOUT CONTRAST    CLINICAL HISTORY:  Altered mental status;    TECHNIQUE:  Low dose axial images were obtained through the head.  Coronal and sagittal reformations were also performed. Contrast was not administered.    COMPARISON:  None.    FINDINGS:  The brain parenchyma appears normal for age with good corticomedullary differentiation.  There is no evidence of acute infarct, hemorrhage, or mass.  There is ventricular and sulcal enlargement consistent with generalized atrophy.  Mild periventricular decreased supratentorial white matter attenuation most likely related to chronic nonspecific small vessel disease.   No mass-effect or midline shift.  Raoul cisterna magna noted.  The paranasal sinuses and mastoid air cells are essentially clear .  The calvarium appears intact.  .                               X-Ray Chest AP Portable (Final result)  Result time 08/28/20 20:38:25    Final result by Josias Han MD (08/28/20 20:38:25)                 Impression:      Nonspecific diffuse increased interstitial attenuation in the lungs, similar to prior.    No significant change from prior  study.      Electronically signed by: Josias Han MD  Date:    08/28/2020  Time:    20:38             Narrative:    EXAMINATION:  XR CHEST AP PORTABLE    CLINICAL HISTORY:  r/o infection;    TECHNIQUE:  Single frontal view of the chest was performed.    COMPARISON:  October 13, 2019.    FINDINGS:  Nonspecific diffuse increased interstitial attenuation in the lungs, similar to prior.    Heart and lungs  appear unchanged when allowing for differences in technique and positioning.    Surgical clips below the left hemidiaphragm, similar to prior.  Osseous structures appear unchanged.                                 Medical Decision Making:   History:   I obtained history from: EMS provider.       <> Summary of History: Report they were called by food delivery service for abnormal behavior by pt  Old Medical Records: I decided to obtain old medical records.  Old Records Summarized: other records.       <> Summary of Records: Has h/o cirrhosis and hep C  Clinical Tests:   Lab Tests: Ordered and Reviewed  ED Management:  Vitals normal. Afebrile. Here w/ AMS. Has mildly distended but non tender abdomen. Certainly no signs of acute abdomen. Low suspicion for SBP. Bedside US w/o intraabdominal fluid present. Suspect dehydration, polysubstance use, hepatic encealopathy etc as cause of symptoms today. Protecting airway and breathing well; no indication for further narcan administration. Do not suspect EtOH withdrawal. Will obtain CBC, CMP, INR, ammonia, rapid COVID19 swab.  FSG 71; no need for IV glucose.    Labs reviewed; grossly WNL w/o actionable values except WBC 14.3K, Plt 86, Na 132, CO2 19, sCr 2.3, t bili 3.4, INR 1.9, ammonia 155.     Lactulose given for hepatic encephalopathy.    MELD-Na 28; up from 14 one month ago.    Discussed with hospital medicine who admitted pt.  Other:   I have discussed this case with another health care provider.       <> Summary of the Discussion: Hospital medicine               Attending Attestation:         Attending Critical Care:   Critical Care Times:   ==============================================================  · Total Critical Care Time - exclusive of procedural time: 40 minutes.  ==============================================================  Critical care reasons: hepatic encephalopathy; acute decompensated liver failure.   Critical care was time spent personally by me on the following activities: obtaining history from patient or relative, examination of patient, review of old charts, ordering lab, x-rays, and/or EKG, development of treatment plan with patient or relative, ordering and performing treatments and interventions, evaluation of patient's response to treatment, discussion with consultants, interpretation of cardiac measurements and re-evaluation of patient's conition.   Critical Care Condition: potentially life-threatening                             Clinical Impression:       ICD-10-CM ICD-9-CM   1. Hepatic encephalopathy  K72.90 572.2   2. KRISTA (acute kidney injury)  N17.9 584.9   3. Hyperbilirubinemia  E80.6 782.4   4. Thrombocytopenia  D69.6 287.5   5. Leukocytosis, unspecified type  D72.829 288.60   6. Tachycardia  R00.0 785.0             ED Disposition Condition    Admit                             Power Hager MD  08/28/20 4211

## 2020-08-28 NOTE — ED NOTES
LOC: The patient is awake but drowsy; oriented to place, time, situation. Affect is appropriate.  Speech is appropriate but is slightly slurred.     APPEARANCE: Patient resting comfortably in no acute distress.  Patient is clean and well groomed.    SKIN: The skin is warm and dry; color consistent with ethnicity.  Patient has normal skin turgor and moist mucus membranes.  Skin intact; small skin tear noted to the left lateral forearm; bleeding controlled. Multiple areas of diffuse bruising noted.     MUSCULOSKELETAL: Patient moving upper and lower extremities without difficulty.  Denies weakness.     RESPIRATORY: Airway is open and patent. Respirations spontaneous, even, easy, and non-labored.  Patient has a normal effort and rate.  No accessory muscle use noted. Denies cough.     CARDIAC:  Normal rhythm and rate noted.  3+ pitting edema noted to the bilateral lower extremities. No complaints of chest pain.      ABDOMEN: Soft and non tender to palpation.  No distention noted.     NEUROLOGIC: Eyes open spontaneously.  Pt is confused.  Follows commands; facial expression symmetrical.  Purposeful motor response noted; normal sensation in all extremities.

## 2020-08-29 PROBLEM — B19.20 DECOMPENSATED CIRRHOSIS RELATED TO HEPATITIS C VIRUS (HCV): Status: ACTIVE | Noted: 2019-03-25

## 2020-08-29 PROBLEM — K74.69 DECOMPENSATED CIRRHOSIS RELATED TO HEPATITIS C VIRUS (HCV): Status: ACTIVE | Noted: 2019-03-25

## 2020-08-29 PROBLEM — K76.82 HEPATIC ENCEPHALOPATHY: Status: ACTIVE | Noted: 2020-08-29

## 2020-08-29 PROBLEM — I81 PORTAL VEIN THROMBOSIS: Status: ACTIVE | Noted: 2020-08-29

## 2020-08-29 LAB
A1AT SERPL-MCNC: 113 MG/DL (ref 100–190)
AFP SERPL-MCNC: 104 NG/ML (ref 0–8.4)
ALBUMIN SERPL BCP-MCNC: 1.9 G/DL (ref 3.5–5.2)
ALP SERPL-CCNC: 62 U/L (ref 55–135)
ALT SERPL W/O P-5'-P-CCNC: 39 U/L (ref 10–44)
ANION GAP SERPL CALC-SCNC: 7 MMOL/L (ref 8–16)
ANISOCYTOSIS BLD QL SMEAR: SLIGHT
APAP SERPL-MCNC: <3 UG/ML (ref 10–20)
APTT BLDCRRT: 51.1 SEC (ref 21–32)
AST SERPL-CCNC: 109 U/L (ref 10–40)
BASOPHILS # BLD AUTO: 0.06 K/UL (ref 0–0.2)
BASOPHILS NFR BLD: 0.4 % (ref 0–1.9)
BILIRUB SERPL-MCNC: 2.4 MG/DL (ref 0.1–1)
BILIRUB UR QL STRIP: NEGATIVE
BUN SERPL-MCNC: 27 MG/DL (ref 6–20)
BURR CELLS BLD QL SMEAR: ABNORMAL
CALCIUM SERPL-MCNC: 7.3 MG/DL (ref 8.7–10.5)
CERULOPLASMIN SERPL-MCNC: 28 MG/DL (ref 15–45)
CHLORIDE SERPL-SCNC: 101 MMOL/L (ref 95–110)
CLARITY UR REFRACT.AUTO: CLEAR
CO2 SERPL-SCNC: 19 MMOL/L (ref 23–29)
COLOR UR AUTO: YELLOW
CREAT SERPL-MCNC: 1.6 MG/DL (ref 0.5–1.4)
DIFFERENTIAL METHOD: ABNORMAL
EOSINOPHIL # BLD AUTO: 0.1 K/UL (ref 0–0.5)
EOSINOPHIL NFR BLD: 0.3 % (ref 0–8)
ERYTHROCYTE [DISTWIDTH] IN BLOOD BY AUTOMATED COUNT: 14 % (ref 11.5–14.5)
EST. GFR  (AFRICAN AMERICAN): 53.7 ML/MIN/1.73 M^2
EST. GFR  (NON AFRICAN AMERICAN): 46.5 ML/MIN/1.73 M^2
FOLATE SERPL-MCNC: 16.9 NG/ML (ref 4–24)
GLUCOSE SERPL-MCNC: 80 MG/DL (ref 70–110)
GLUCOSE UR QL STRIP: NEGATIVE
HCT VFR BLD AUTO: 30.2 % (ref 40–54)
HGB BLD-MCNC: 10.3 G/DL (ref 14–18)
HGB UR QL STRIP: NEGATIVE
IGG SERPL-MCNC: 2575 MG/DL (ref 650–1600)
IMM GRANULOCYTES # BLD AUTO: 0.3 K/UL (ref 0–0.04)
IMM GRANULOCYTES NFR BLD AUTO: 2.1 % (ref 0–0.5)
INR PPP: 2.2 (ref 0.8–1.2)
IRON SERPL-MCNC: 50 UG/DL (ref 45–160)
KETONES UR QL STRIP: NEGATIVE
LEUKOCYTE ESTERASE UR QL STRIP: NEGATIVE
LYMPHOCYTES # BLD AUTO: 1 K/UL (ref 1–4.8)
LYMPHOCYTES NFR BLD: 6.6 % (ref 18–48)
MCH RBC QN AUTO: 37.5 PG (ref 27–31)
MCHC RBC AUTO-ENTMCNC: 34.1 G/DL (ref 32–36)
MCV RBC AUTO: 110 FL (ref 82–98)
MONOCYTES # BLD AUTO: 1 K/UL (ref 0.3–1)
MONOCYTES NFR BLD: 7 % (ref 4–15)
NEUTROPHILS # BLD AUTO: 12.1 K/UL (ref 1.8–7.7)
NEUTROPHILS NFR BLD: 83.6 % (ref 38–73)
NITRITE UR QL STRIP: NEGATIVE
NRBC BLD-RTO: 0 /100 WBC
OVALOCYTES BLD QL SMEAR: ABNORMAL
PH UR STRIP: 5 [PH] (ref 5–8)
PLATELET # BLD AUTO: 71 K/UL (ref 150–350)
PLATELET BLD QL SMEAR: ABNORMAL
PMV BLD AUTO: 11.1 FL (ref 9.2–12.9)
POLYCHROMASIA BLD QL SMEAR: ABNORMAL
POTASSIUM SERPL-SCNC: 3.5 MMOL/L (ref 3.5–5.1)
PROT SERPL-MCNC: 5.8 G/DL (ref 6–8.4)
PROT UR QL STRIP: NEGATIVE
PROTHROMBIN TIME: 23.8 SEC (ref 9–12.5)
RBC # BLD AUTO: 2.75 M/UL (ref 4.6–6.2)
SALICYLATES SERPL-MCNC: <5 MG/DL (ref 15–30)
SATURATED IRON: 35 % (ref 20–50)
SODIUM SERPL-SCNC: 127 MMOL/L (ref 136–145)
SODIUM UR-SCNC: <20 MMOL/L (ref 20–250)
SP GR UR STRIP: 1.02 (ref 1–1.03)
TOTAL IRON BINDING CAPACITY: 141 UG/DL (ref 250–450)
TOXIC GRANULES BLD QL SMEAR: PRESENT
TRANSFERRIN SERPL-MCNC: 95 MG/DL (ref 200–375)
URN SPEC COLLECT METH UR: NORMAL
VANCOMYCIN SERPL-MCNC: 16.5 UG/ML
VIT B12 SERPL-MCNC: >2000 PG/ML (ref 210–950)
WBC # BLD AUTO: 14.49 K/UL (ref 3.9–12.7)

## 2020-08-29 PROCEDURE — 86235 NUCLEAR ANTIGEN ANTIBODY: CPT

## 2020-08-29 PROCEDURE — 82784 ASSAY IGA/IGD/IGG/IGM EACH: CPT

## 2020-08-29 PROCEDURE — 99233 SBSQ HOSP IP/OBS HIGH 50: CPT | Mod: ,,, | Performed by: INTERNAL MEDICINE

## 2020-08-29 PROCEDURE — P9045 ALBUMIN (HUMAN), 5%, 250 ML: HCPCS | Mod: JG | Performed by: STUDENT IN AN ORGANIZED HEALTH CARE EDUCATION/TRAINING PROGRAM

## 2020-08-29 PROCEDURE — 82607 VITAMIN B-12: CPT

## 2020-08-29 PROCEDURE — 82746 ASSAY OF FOLIC ACID SERUM: CPT

## 2020-08-29 PROCEDURE — 80329 ANALGESICS NON-OPIOID 1 OR 2: CPT

## 2020-08-29 PROCEDURE — 87529 HSV DNA AMP PROBE: CPT

## 2020-08-29 PROCEDURE — 84425 ASSAY OF VITAMIN B-1: CPT

## 2020-08-29 PROCEDURE — 81003 URINALYSIS AUTO W/O SCOPE: CPT

## 2020-08-29 PROCEDURE — 63600175 PHARM REV CODE 636 W HCPCS: Mod: JG | Performed by: STUDENT IN AN ORGANIZED HEALTH CARE EDUCATION/TRAINING PROGRAM

## 2020-08-29 PROCEDURE — 80202 ASSAY OF VANCOMYCIN: CPT

## 2020-08-29 PROCEDURE — 85025 COMPLETE CBC W/AUTO DIFF WBC: CPT

## 2020-08-29 PROCEDURE — 99222 1ST HOSP IP/OBS MODERATE 55: CPT | Mod: ,,, | Performed by: INTERNAL MEDICINE

## 2020-08-29 PROCEDURE — 80053 COMPREHEN METABOLIC PANEL: CPT

## 2020-08-29 PROCEDURE — 82103 ALPHA-1-ANTITRYPSIN TOTAL: CPT

## 2020-08-29 PROCEDURE — 86256 FLUORESCENT ANTIBODY TITER: CPT | Mod: 91

## 2020-08-29 PROCEDURE — 25000003 PHARM REV CODE 250: Performed by: STUDENT IN AN ORGANIZED HEALTH CARE EDUCATION/TRAINING PROGRAM

## 2020-08-29 PROCEDURE — 82390 ASSAY OF CERULOPLASMIN: CPT

## 2020-08-29 PROCEDURE — 11000001 HC ACUTE MED/SURG PRIVATE ROOM

## 2020-08-29 PROCEDURE — 63600175 PHARM REV CODE 636 W HCPCS: Performed by: STUDENT IN AN ORGANIZED HEALTH CARE EDUCATION/TRAINING PROGRAM

## 2020-08-29 PROCEDURE — 85610 PROTHROMBIN TIME: CPT

## 2020-08-29 PROCEDURE — 83540 ASSAY OF IRON: CPT

## 2020-08-29 PROCEDURE — 85730 THROMBOPLASTIN TIME PARTIAL: CPT

## 2020-08-29 PROCEDURE — 36415 COLL VENOUS BLD VENIPUNCTURE: CPT

## 2020-08-29 PROCEDURE — 99233 PR SUBSEQUENT HOSPITAL CARE,LEVL III: ICD-10-PCS | Mod: ,,, | Performed by: INTERNAL MEDICINE

## 2020-08-29 PROCEDURE — 84300 ASSAY OF URINE SODIUM: CPT

## 2020-08-29 PROCEDURE — 94761 N-INVAS EAR/PLS OXIMETRY MLT: CPT

## 2020-08-29 PROCEDURE — 80307 DRUG TEST PRSMV CHEM ANLYZR: CPT

## 2020-08-29 PROCEDURE — 99222 PR INITIAL HOSPITAL CARE,LEVL II: ICD-10-PCS | Mod: ,,, | Performed by: INTERNAL MEDICINE

## 2020-08-29 RX ORDER — ALBUMIN HUMAN 50 G/1000ML
68 SOLUTION INTRAVENOUS ONCE
Status: COMPLETED | OUTPATIENT
Start: 2020-08-29 | End: 2020-08-29

## 2020-08-29 RX ADMIN — PIPERACILLIN SODIUM AND TAZOBACTAM SODIUM 4.5 G: 4; .5 INJECTION, POWDER, LYOPHILIZED, FOR SOLUTION INTRAVENOUS at 01:08

## 2020-08-29 RX ADMIN — LACTULOSE 20 G: 20 SOLUTION ORAL at 08:08

## 2020-08-29 RX ADMIN — PIPERACILLIN SODIUM AND TAZOBACTAM SODIUM 4.5 G: 4; .5 INJECTION, POWDER, LYOPHILIZED, FOR SOLUTION INTRAVENOUS at 11:08

## 2020-08-29 RX ADMIN — ALBUMIN (HUMAN) 68 G: 12.5 SOLUTION INTRAVENOUS at 12:08

## 2020-08-29 RX ADMIN — PHYTONADIONE 10 MG: 10 INJECTION, EMULSION INTRAMUSCULAR; INTRAVENOUS; SUBCUTANEOUS at 11:08

## 2020-08-29 RX ADMIN — LACTULOSE 20 G: 20 SOLUTION ORAL at 04:08

## 2020-08-29 RX ADMIN — RIFAXIMIN 550 MG: 550 TABLET ORAL at 08:08

## 2020-08-29 NOTE — PROGRESS NOTES
Post contrast injection MRI Tech noticed no contrast injected into patient, upon assessment noticed contrast spillage on patient arm and scanning table, patient's IV pulled away from the insertion and connecting site beneath the Tegaderm dressing, pressure dressing applied, notified JOHN Banerjee, pt. Transported  back to room for later MRI.

## 2020-08-29 NOTE — CONSULTS
"[facility]  Hepatology  Consult/progress note    Patient Name: Nii Davidson  MRN: [id number]  Admission Date: 8/28/2020  Hospital Length of Stay: 1 days  Code Status: Full Code   Attending Provider: Dylon Monterroso MD   Consulting Provider: Yoel David MD  Primary Care Physician: Nancy Garcia NP  Principal Problem:Altered mental status    Inpatient consult to Hepatology  Consult performed by: Yoel David MD  Consult ordered by: Dontae Moreno MD        Subjective:     HPI: Nii Davidson is a 59 y.o. male with history of HCV, opiate abuse, HTN presented with altered mental status. Patient is a poor historian, history obtained by primary team. Patient was found by food delivery service confused and shaking, and was brought to the ED by EMS who stated patient was somnolent and confused. He was found to have respiratory depression so was given narcan and his mental status and respiratory rate improved. He states that he takes hydrocodone in the morning, denies other IVDU, reports feeling chills recently. He also reports he has "liver cancer" and that's why he is here.     Upon review of care everywhere, patient was seen by GI at [facility] by Dr. Cota and NP Macie, was seen for HCV, initially diagnosed in 1998, possibly from IVDU, was not treated, he had episdes of confusion and memory loss at that time, along with LE edema. HCV work up and US and EGD was ordered at that time, patient did not have insurance. He was started on lactulose for HE. HCV quant showed [id number], genotype 1A hbvcAB total was positive at the time along with surface antibody. There was plan to start sovaldi and ribavirin but was denied due to n o insurance, so follow up was established with them to try to get medicare/disability, his MELD was 6 at the time. EGD showed grade 2 EV s/p banding, PHG, recommended repeat EGD in 4 weeks. Colonoscopy showed normal TI and colon, inadequate prep, recommended " repeat in 5 years. After this encounter in 2014, patient was lost to follow up. CT abdomen shows cirrhosis, milid ascites, US wit hdoppler shows Findings compatible with hepatic cirrhosis.  There is an isoechoic approximately 4.1 cm mass within the inferior right hepatic lobe.  This is not well delineated on prior CT without contrast and may represent a regenerative nodule, however MRI is recommended for further evaluation to exclude hepatocellular carcinoma. 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 His vitals are stable, labs show WBC 14, hb 10, PLT 71, INR 1.9-->2.2. /ALT 47, ammonia 155, .       He reports to me he drinks alcohol, lives in an apartment, has no family around.           Past Medical History:   Diagnosis Date    Cirrhosis     Encounter for blood transfusion     Hepatitis C     Hypertension        Past Surgical History:   Procedure Laterality Date    EXPLORATORY LAPAROTOMY  1989       Family History   Problem Relation Age of Onset    Hypertension Mother     Heart disease Mother        Social History     Socioeconomic History    Marital status: Single     Spouse name: Not on file    Number of children: Not on file    Years of education: Not on file    Highest education level: Not on file   Occupational History    Not on file   Social Needs    Financial resource strain: Not on file    Food insecurity     Worry: Not on file     Inability: Not on file    Transportation needs     Medical: Not on file     Non-medical: Not on file   Tobacco Use    Smoking status: Current Every Day Smoker     Packs/day: 0.50     Types: Cigarettes    Smokeless tobacco: Never Used    Tobacco comment: Patient asking for nicotine patch   Substance and Sexual Activity    Alcohol use: Yes     Alcohol/week: 3.0 standard drinks     Types: 3 Cans of beer per week    Drug use: Not Currently    Sexual activity: Not on file   Lifestyle    Physical activity     Days per week: Not on file     Minutes per  session: Not on file    Stress: Not on file   Relationships    Social connections     Talks on phone: Not on file     Gets together: Not on file     Attends Bahai service: Not on file     Active member of club or organization: Not on file     Attends meetings of clubs or organizations: Not on file     Relationship status: Not on file   Other Topics Concern    Not on file   Social History Narrative    Not on file       No current facility-administered medications on file prior to encounter.      Current Outpatient Medications on File Prior to Encounter   Medication Sig Dispense Refill    acyclovir (ZOVIRAX) 800 MG Tab Take 1 tablet (800 mg total) by mouth 5 (five) times daily. for 7 days 35 tablet 0       Review of patient's allergies indicates:  No Known Allergies    Review of Systems   Unable to perform ROS: Mental status change        Objective:     Vitals:    08/29/20 0720   BP: 95/67   Pulse: 83   Resp: 13   Temp:          Physical Exam  Vitals signs reviewed.   Constitutional:       General: He is in acute distress.      Appearance: He is ill-appearing and diaphoretic.      Comments: tremulous   HENT:      Head: Normocephalic and atraumatic.   Eyes:      General: Scleral icterus present.      Conjunctiva/sclera: Conjunctivae normal.   Neck:      Musculoskeletal: Neck supple.   Cardiovascular:      Rate and Rhythm: Normal rate and regular rhythm.   Pulmonary:      Effort: Pulmonary effort is normal.      Breath sounds: Normal breath sounds.   Abdominal:      General: Bowel sounds are normal. There is no distension.      Palpations: Abdomen is soft. There is no mass.      Tenderness: There is no abdominal tenderness. There is no guarding or rebound.   Musculoskeletal:      Right lower leg: No edema.      Left lower leg: No edema.   Skin:     General: Skin is warm.   Neurological:      Mental Status: He is alert. He is disoriented.      Comments: Asterixis present          Significant Labs:  Recent Labs    Lab 08/28/20  1552 08/29/20  0358   HGB 11.7* 10.3*       Lab Results   Component Value Date    WBC 14.49 (H) 08/29/2020    HGB 10.3 (L) 08/29/2020    HCT 30.2 (L) 08/29/2020     (H) 08/29/2020    PLT 71 (L) 08/29/2020       Lab Results   Component Value Date     (L) 08/29/2020    K 3.5 08/29/2020     08/29/2020    CO2 19 (L) 08/29/2020    BUN 27 (H) 08/29/2020    CREATININE 1.6 (H) 08/29/2020    CALCIUM 7.3 (L) 08/29/2020    ANIONGAP 7 (L) 08/29/2020    ESTGFRAFRICA 53.7 (A) 08/29/2020    EGFRNONAA 46.5 (A) 08/29/2020       Lab Results   Component Value Date    ALT 39 08/29/2020     (H) 08/29/2020    ALKPHOS 62 08/29/2020    BILITOT 2.4 (H) 08/29/2020       Lab Results   Component Value Date    INR 2.2 (H) 08/29/2020    INR 1.9 (H) 08/28/2020    INR 1.3 (H) 07/17/2020           Significant Imaging:  Reviewed pertinent radiology findings.       Assessment/Plan:       MELD-Na score: 29 at 8/29/2020  3:58 AM  MELD score: 23 at 8/29/2020  3:58 AM  Calculated from:  Serum Creatinine: 1.6 mg/dL at 8/29/2020  3:58 AM  Serum Sodium: 127 mmol/L at 8/29/2020  3:58 AM  Total Bilirubin: 2.4 mg/dL at 8/29/2020  3:58 AM  INR(ratio): 2.2 at 8/29/2020  3:58 AM  Age: 59 years 11 months    Problem List:  1. Cirrhosis decompensated with HE and ascites and EV  2. HCV, possible Etoh(PETH pending), polysubstance abuse(presumed opioids, positive of Utox)  3. Possible HCC 4.1 cm , with elevated AFP(104)  4. PVT  5. KRISTA likely prerenal given Cindy <20  6. hyponatremia  7. Coagulopathy  8. Leukocytosis  9. HBVsAB positive, HBVcAB total positive, meaning immune to Hep B but had infection in past.     Plan:  -KRISTA: albumin 1g per body weight daily in divided doses, for 2 days to assess if prerenal vs HRS, hold diuretics, u na ordered, strict I/O, daily weights.   - Ascites: mild  paracentesis to r/o SBP; 2 gram Na restrict; hold diuretics in setting of KRISTA  - Esophageal varices: last EGD 2014, grade 2 EV, banded,  needs repeat EGD for variceal surveillance,   -PVT: will need to determine if he can be compliant with anticoagulation.   -leukocytosis: pan culture with blood cultures  -Hyponatremia: avoid normal saline, recommend albumin for volume expansion.    - Hepatic encephalopathy: present  lactulose titrated to 2-3 BMs daily, add rifaximin 550 twice daily  - HCC screening: order MRI triple phase given lesion on US  coagulopathy: Vitamin K 10 IV X 3 days.   - Vaccination status: check HAV total Ab, and HbsAb if neg, will need HAV/HBV vaccine outpatient. But appears patient is immune to Hep B  - Transplant: have not started transplant eval. Echo with bubbles, addiction psych to evaluate. Will order serologies to r/o other causes of cirrhosis.     Please obtain daily CBC, BMP, LFT, INR  Thank you for involving us in the care of Nii Davidson. Please call with any additional questions, concerns or changes in the patient's clinical status.    Yoel David MD  Gastroenterology Fellow PGY IV   [facility]

## 2020-08-29 NOTE — ASSESSMENT & PLAN NOTE
Left portal vein occlusion seen on liver ultrasound with doppler  Plan:  - Follow hepatology recommendations for anticoagulation

## 2020-08-29 NOTE — PLAN OF CARE
Patient quiet throughout night. Various diagnostic tests ordered and complete through the night. Antibiotic therapy initiated and tolerated well.  Patient remains NPO. No incontinent episodes noted. Patient sleepy throughout shift but able to arouse to verbal and tactile stimuli.  Pt oriented to self and place.  Patient unable to safely ambulated with/without staff at this time due to LOC. Patient able to self position in bed. 18G to left AC remains patent and intact. Bed in low position, side rails upx2, bed alarm on, will continue to monitor for changes

## 2020-08-29 NOTE — ASSESSMENT & PLAN NOTE
Cr 2.3 from 1.1 a month ago. Unclear etiology at this time but must consider prerenal KRISTA as well as HRS. Retroperitoneal ultrasound normal with urine sodium <20.   Plan:  - Albumin started

## 2020-08-29 NOTE — CONSULTS
8/29/2020 9:47 AM   Nii Davidson   1960   [id number]      PSYCHIATRY CONSULT         Consult received for this patient with hepatic cirrhosis, encephalopathy, HCV, and HCC. Spoke with primary team.    Will fully evaluate this patient Monday as it is non urgent and consulting team ok with this plan.        -Please contact ON CALL psychiatry service for any acute issues that may arise. Thank you.      Gene Perez MD  Department of Psychiatry   [facility]  8/29/2020 9:47 AM

## 2020-08-29 NOTE — PROGRESS NOTES
"[facility] Hospital Medicine  Progress Note    Patient Name: Nii Davidson  MRN: [id number]  Patient Class: IP- Inpatient   Admission Date: 8/28/2020  Length of Stay: 1 days  Attending Physician: Dylon Monterroso MD  Primary Care Provider: Nancy Garcia NP    [facility] Medicine Team: [facility] 1 Dontae Moreno MD    Subjective:     Principal Problem:<principal problem not specified>        HPI:  Nii Davidson is a 59 year old male with history of hepatitis C cirrhosis, opiate abuse, and hypertension who presents today with altered mental status. Patient was found by food delivery service confused and shaking and was brought to the emergency department by EMS who stated that patient was confused and somnolent. He was given 0.5 mg of narcan by EMS after he was found to have depressed respiratory rate. They stated that it improved his mental status and respiratory rate. He stated that he may have had fevers over the past day but did not elaborate. He describes taking hydrocodone in the morning for nondescript pain. Patient also describes having a past medical history of "liver cancer" although review of chart is not clear.     There are recent laboratory results on 7/17 ordered by Dr. Simmons at [facility]. Of note, PTT 34, PT 13.8, INR 1.3, albumin 2.5, and AFP elevated 63. Previously seen in 2014 at [facility] with Hepatis C Cirrhosis. Grade II esophageal varices were seen in EGD in and banded at that time.    Overview/Hospital Course:  Nii Davidson is a 59 year old male with history of hepatitis C cirrhosis, opiate abuse, and hypertension who presents today with altered mental status likely due to hepatic encephalopathy and new onset acute kidney injury. Patient describes a previous history of "liver cancer" unconfirmed from chart review. CT abdomen pelvis consistent with cirrhosis with portal vein hypertension and evidence of varices, enlarged " gallbladder, mild ascites, increased retroperitoneal and karen hepatis lymph nodes. Liver ultrasound with 4.1 cm lesion in the right hepatic lobe. Patient started on lactulose and rifaximin for hepatic encephalopathy. Albumin for acute kidney injury.    Interval History: No acute events overnight and patient hemodynamically stable. He continues to be somnolent but occasionally has moments of lucidity.     Review of Systems   Unable to perform ROS: Mental status change     Objective:     Vital Signs (Most Recent):  Temp: 96.1 °F (35.6 °C) (08/29/20 1134)  Pulse: 82 (08/29/20 1134)  Resp: 13 (08/29/20 0720)  BP: 110/62 (08/29/20 1134)  SpO2: 96 % (08/29/20 1134) Vital Signs (24h Range):  Temp:  [96.1 °F (35.6 °C)-98.8 °F (37.1 °C)] 96.1 °F (35.6 °C)  Pulse:  [82-96] 82  Resp:  [11-16] 13  SpO2:  [95 %-98 %] 96 %  BP: ()/(62-77) 110/62     Weight: 68.3 kg (150 lb 9.2 oz)  Body mass index is 21 kg/m².    Intake/Output Summary (Last 24 hours) at 8/29/2020 1547  Last data filed at 8/29/2020 0800  Gross per 24 hour   Intake 600 ml   Output 500 ml   Net 100 ml      Physical Exam  Constitutional:       General: He is not in acute distress.     Appearance: Normal appearance. He is normal weight. He is ill-appearing. He is not toxic-appearing or diaphoretic.      Comments: Patient shaking vigorously, uncooperative during examination     HENT:      Head: Normocephalic and atraumatic.      Nose: No congestion or rhinorrhea.      Mouth/Throat:      Mouth: Mucous membranes are dry.      Pharynx: No oropharyngeal exudate or posterior oropharyngeal erythema.   Eyes:      General: Scleral icterus present.         Right eye: No discharge.         Left eye: No discharge.   Neck:      Musculoskeletal: No neck rigidity or muscular tenderness.   Cardiovascular:      Rate and Rhythm: Normal rate and regular rhythm.      Pulses: Normal pulses.      Heart sounds: Normal heart sounds. No murmur. No friction rub. No gallop.    Pulmonary:       Effort: Pulmonary effort is normal. No respiratory distress.      Breath sounds: Normal breath sounds. No stridor. No wheezing, rhonchi or rales.   Chest:      Chest wall: No tenderness.   Abdominal:      General: Abdomen is flat. Bowel sounds are normal. There is no distension.      Palpations: Abdomen is soft. There is no mass.      Tenderness: There is no abdominal tenderness. There is no guarding.      Comments: Left lower quadrant tenderness with deep palpation. Flat abdomen with vertical central surgical scar.    Musculoskeletal:      Right lower leg: Edema present.      Left lower leg: Edema present.      Comments: Bilateral lower extremity pitting edema to the knee   Lymphadenopathy:      Cervical: No cervical adenopathy.   Skin:     General: Skin is warm and dry.      Coloration: Skin is jaundiced.      Findings: Bruising present.      Comments: Hyperpigmented skin throughout, particularly lower extremities.    Echymomoses on bilateral forearms.    Multiple spider naevi on upper chest.   Neurological:      General: No focal deficit present.      Mental Status: He is alert.      Comments: [place] to self and place         Significant Labs: All pertinent labs within the past 24 hours have been reviewed.    Significant Imaging: I have reviewed and interpreted all pertinent imaging results/findings within the past 24 hours.      Assessment/Plan:      Hepatic encephalopathy  Nii Davidson is a 59 year old male with history of hepatitis C cirrhosis, opiate abuse, and hypertension who presented with altered mental status. Supposed history of liver cancer. Limited history and records. There is a broad differential for altered mental status but most likely related to hepatic encephalopathy at this time considering known liver disease with elevated ammonia 155 and worsening PT/INR from July labs. Other etiologies of altered mental status ruled out or being treated. MRI liver consistent with nodular heterogenous  liver parenchyma consistent with cirrhosis and a nonspecific mass in segment 6 that is ~3.9 cm.    Plan:  - Daily CBC, CMP, PTT, PT/INR  - Continue vancomycin and zosyn  - Lactulose and rifaximin  - Vitamin K IV    KRISTA (acute kidney injury)  Cr 2.3 from 1.1 a month ago. Unclear etiology at this time but must consider prerenal KRISTA as well as HRS. Retroperitoneal ultrasound normal with urine sodium <20.   Plan:  - Albumin started      Portal vein thrombosis  Left portal vein occlusion seen on liver ultrasound with doppler  Plan:  - Follow hepatology recommendations for anticoagulation      Decompensated cirrhosis related to hepatitis C virus (HCV)  Patient previously seen at [facility] for HCV cirrhosis in 2014 with evidence of portal hypertension and varices requiring banding at that time.    VTE Risk Mitigation (From admission, onward)         Ordered     IP VTE HIGH RISK PATIENT  Once      08/28/20 1827     Place sequential compression device  Until discontinued      08/28/20 1827                Discharge Planning   NHUNG: 8/31/2020     Code Status: Full Code   Is the patient medically ready for discharge?:     Reason for patient still in hospital (select all that apply): Treatment                     Dontae Moreno MD  Department of Hospital Medicine   [facility]

## 2020-08-29 NOTE — PROGRESS NOTES
Pharmacokinetic Initial Assessment: IV Vancomycin    Assessment/Plan:    Initiate intravenous vancomycin with loading dose of 2000 mg once with subsequent doses when random concentrations are less than 20 mcg/mL  Desired empiric serum trough concentration is 15 to 20 mcg/mL  Draw vancomycin random level on 8/30 at am draw.  Pharmacy will continue to follow and monitor vancomycin.      Thank you for allowing pharmacy participate in this patient's care.     Virginia Pereyra, PharmD  Clinical Pharmacist, [facility] Pharmacy/[facility] Team  [email or web address]  office [phone]         Patient brief summary:  Nii Davidson is a 59 y.o. male initiated on antimicrobial therapy with IV Vancomycin for treatment of suspected  unknown increased WBC    Drug Allergies:   Review of patient's allergies indicates:  No Known Allergies    Actual Body Weight:   78kg    Renal Function:   Estimated Creatinine Clearance: 36.8 mL/min (A) (based on SCr of 2.3 mg/dL (H)).,     Dialysis Method (if applicable):  N/A    CBC (last 72 hours):  Recent Labs   Lab Result Units 08/28/20  1552   WBC K/uL 14.30*   Hemoglobin g/dL 11.7*   Hematocrit % 33.6*   Platelets K/uL 86*   Gran% % 88.0*   Lymph% % 3.0*   Mono% % 7.8   Eosinophil% % 0.1   Basophil% % 0.3   Differential Method  Automated       Metabolic Panel (last 72 hours):  Recent Labs   Lab Result Units 08/28/20  1552   Sodium mmol/L 132*   Potassium mmol/L 4.1   Chloride mmol/L 103   CO2 mmol/L 19*   Glucose mg/dL 66*   BUN, Bld mg/dL 24*   Creatinine mg/dL 2.3*   Albumin g/dL 2.4*   Total Bilirubin mg/dL 3.4*   Alkaline Phosphatase U/L 63   AST U/L 125*   ALT U/L 47*       Drug levels (last 3 results):  No results for input(s): VANCOMYCINRA, VANCOMYCINPE, VANCOMYCINTR in the last 72 hours.    Microbiologic Results:  Microbiology Results (last 7 days)       Procedure Component Value Units Date/Time    Blood culture [[id number]] Collected: 08/28/20 2013    Order Status: Sent Specimen: Blood from  Peripheral, Left Arm Updated: 08/28/20 2014    Blood culture [[id number]] Collected: 08/28/20 2013    Order Status: Sent Specimen: Blood from Peripheral, Right Hand Updated: 08/28/20 2014

## 2020-08-29 NOTE — ASSESSMENT & PLAN NOTE
Patient previously seen at [facility] for HCV cirrhosis in 2014 with evidence of portal hypertension and varices requiring banding at that time.

## 2020-08-29 NOTE — H&P
"[facility] Hospital Medicine  History & Physical    Patient Name: Nii Davidson  MRN: [id number]  Admission Date: 8/28/2020  Attending Physician: Dylon Monterroso MD   Primary Care Provider: Nancy Garcia NP    [facility] Medicine Team: [facility] 1 Dontae Moreno MD     Patient information was obtained from ER records.     Subjective:     Principal Problem:Altered mental status    Chief Complaint:   Chief Complaint   Patient presents with    Addiction Problem     EMS was contacted due to patient being confused, upon EMS arrival patient with pinpoint pupils and was with apneic respirations, patient received .5mg Narcan and became more repsonsive. Hx opioid use.         HPI: Nii Davidson is a 59 year old male with history of hepatitis C cirrhosis, opiate abuse, and hypertension who presents today with altered mental status. Patient was found by food delivery service confused and shaking and was brought to the emergency department by EMS who stated that patient was confused and somnolent. He was given 0.5 mg of narcan by EMS after he was found to have depressed respiratory rate. They stated that it improved his mental status and respiratory rate. He stated that he may have had fevers over the past day but did not elaborate. He describes taking hydrocodone in the morning for nondescript pain. Patient also describes having a past medical history of "liver cancer" although review of chart is not clear.     There are recent laboratory results on 7/17 ordered by Dr. Simmons at [facility]. Of note, PTT 34, PT 13.8, INR 1.3, albumin 2.5, and AFP elevated 63. Previously seen in 2014 at [facility] with Hepatis C Cirrhosis. Grade II esophageal varices were seen in EGD and banded at that time.    Past Medical History:   Diagnosis Date    Cirrhosis     Encounter for blood transfusion     Hepatitis C     Hypertension        Past Surgical History:   Procedure " Laterality Date    EXPLORATORY LAPAROTOMY  1989       Review of patient's allergies indicates:  No Known Allergies    No current facility-administered medications on file prior to encounter.      Current Outpatient Medications on File Prior to Encounter   Medication Sig    acyclovir (ZOVIRAX) 800 MG Tab Take 1 tablet (800 mg total) by mouth 5 (five) times daily. for 7 days     Family History     Problem Relation (Age of Onset)    Heart disease Mother    Hypertension Mother        Tobacco Use    Smoking status: Current Every Day Smoker     Packs/day: 0.50     Types: Cigarettes    Smokeless tobacco: Never Used    Tobacco comment: Patient asking for nicotine patch   Substance and Sexual Activity    Alcohol use: Yes     Alcohol/week: 3.0 standard drinks     Types: 3 Cans of beer per week    Drug use: Not Currently    Sexual activity: Not on file     Review of Systems   Unable to perform ROS: Mental status change     Objective:     Vital Signs (Most Recent):  Temp: 98.2 °F (36.8 °C) (08/28/20 1530)  Pulse: 94 (08/28/20 1724)  Resp: 14 (08/28/20 1724)  BP: 121/66 (08/28/20 1724)  SpO2: 95 % (08/28/20 1724) Vital Signs (24h Range):  Temp:  [98.2 °F (36.8 °C)] 98.2 °F (36.8 °C)  Pulse:  [] 94  Resp:  [12-14] 14  SpO2:  [95 %-97 %] 95 %  BP: (121-134)/(66-71) 121/66     Weight: 78 kg (172 lb)  Body mass index is 23.99 kg/m².    Physical Exam  Constitutional:       General: He is not in acute distress.     Appearance: Normal appearance. He is normal weight. He is ill-appearing. He is not toxic-appearing or diaphoretic.      Comments: Patient shaking vigorously, uncooperative during examination     HENT:      Head: Normocephalic and atraumatic.      Nose: No congestion or rhinorrhea.      Mouth/Throat:      Mouth: Mucous membranes are dry.      Pharynx: No oropharyngeal exudate or posterior oropharyngeal erythema.   Eyes:      General: Scleral icterus present.         Right eye: No discharge.         Left eye: No  discharge.   Neck:      Musculoskeletal: No neck rigidity or muscular tenderness.   Cardiovascular:      Rate and Rhythm: Normal rate and regular rhythm.      Pulses: Normal pulses.      Heart sounds: Normal heart sounds. No murmur. No friction rub. No gallop.    Pulmonary:      Effort: Pulmonary effort is normal. No respiratory distress.      Breath sounds: Normal breath sounds. No stridor. No wheezing, rhonchi or rales.   Chest:      Chest wall: No tenderness.   Abdominal:      General: Abdomen is flat. Bowel sounds are normal. There is no distension.      Palpations: Abdomen is soft. There is no mass.      Tenderness: There is abdominal tenderness. There is no guarding.      Comments: Left lower quadrant tenderness with deep palpation. Flat abdomen with vertical central surgical scar.    Musculoskeletal:      Right lower leg: Edema present.      Left lower leg: Edema present.      Comments: Bilateral lower extremity pitting edema to the knee   Lymphadenopathy:      Cervical: No cervical adenopathy.   Skin:     General: Skin is warm and dry.      Coloration: Skin is jaundiced.      Findings: Bruising present.      Comments: Hyperpigmented skin throughout, particularly lower extremities.    Echymomoses on bilateral forearms.    Multiple spider naevi on upper chest.   Neurological:      General: No focal deficit present.      Mental Status: He is alert.      Comments: [place] to self and place             Significant Labs: All pertinent labs within the past 24 hours have been reviewed.    Significant Imaging: I have reviewed all pertinent imaging results/findings within the past 24 hours.    Assessment/Plan:     * Altered mental status  Nii Davidson is a 59 year old male with history of hepatitis C cirrhosis, opiate abuse, and hypertension who presented with altered mental status. Supposed history of liver cancer. Limited history and records. There is a broad differential but most likely related to hepatic  encephalopathy at this time considering known liver disease with elevated ammonia 155 and worsening PT/INR from July labs. However, etiology include infection particularly spontaneously bacterial peritonitis. Patient has nondistended flat abdomen but there is some left lower quadrant tenderness. Patient has leukocytosis and shaking, which could be interpreted as rigors but no fever. Concern of intoxicant or drug related mental status in the setting of recent hydromorphone use. Negative ethanol. Admission CMP similar to previous aside from new acute kidney injury. Saturating >95% on room air.     Plan:  - Daily CBC, CMP, Mg, Phos, PTT, PT/INR  - Start vancomycin and piperacillin tazobactam due to potential of infection with unclear etiology  - CT head without contrast, ultrasound liver doppler, chest xray, CT abdomen and pelvis  - Urine toxicology, urinalysis  - Lactulose TID  - Consult hepatology for further reccomendations      KRISTA (acute kidney injury)  Cr 2.3 from 1.1 a month ago. Unclear etiology at this time but must consider prerenal KRISTA as well as HRS.  Plan:  - Urine electrolytes and retroperitoneal ultrasound  - Will hold fluids for now in the setting of cirrhosis and edematous state        VTE Risk Mitigation (From admission, onward)         Ordered     IP VTE HIGH RISK PATIENT  Once      08/28/20 1827     Place sequential compression device  Until discontinued      08/28/20 1827                   Dontae Moreno MD  Department of Hospital Medicine   [facility]

## 2020-08-29 NOTE — HOSPITAL COURSE
"Nii Davidson is a 59 year old male with history of hepatitis C cirrhosis, opiate abuse, and hypertension who presents today with altered mental status likely due to hepatic encephalopathy and new onset acute kidney injury. Patient describes a previous history of "liver cancer" unconfirmed from chart review. CT abdomen pelvis consistent with cirrhosis with portal vein hypertension and evidence of varices, enlarged gallbladder, mild ascites, increased retroperitoneal and karen hepatis lymph nodes. Liver ultrasound with 4.1 cm lesion in the right hepatic lobe. Patient started on lactulose and rifaximin for hepatic encephalopathy. Albumin for acute kidney injury.  "

## 2020-08-29 NOTE — HPI
"Nii Davidson is a 59 year old male with history of hepatitis C cirrhosis, opiate abuse, and hypertension who presents today with altered mental status. Patient was found by food delivery service confused and shaking and was brought to the emergency department by EMS who stated that patient was confused and somnolent. He was given 0.5 mg of narcan by EMS after he was found to have depressed respiratory rate. They stated that it improved his mental status and respiratory rate. He stated that he may have had fevers over the past day but did not elaborate. He describes taking hydrocodone in the morning for nondescript pain. Patient also describes having a past medical history of "liver cancer" although review of chart is not clear.     There are recent laboratory results on 7/17 ordered by Dr. Simmons at [facility]. Of note, PTT 34, PT 13.8, INR 1.3, albumin 2.5, and AFP elevated 63. Previously seen in 2014 at [facility] with Hepatis C Cirrhosis. Grade II esophageal varices were seen in EGD in and banded at that time.  "

## 2020-08-29 NOTE — SUBJECTIVE & OBJECTIVE
Past Medical History:   Diagnosis Date    Cirrhosis     Encounter for blood transfusion     Hepatitis C     Hypertension        Past Surgical History:   Procedure Laterality Date    EXPLORATORY LAPAROTOMY  1989       Review of patient's allergies indicates:  No Known Allergies    No current facility-administered medications on file prior to encounter.      Current Outpatient Medications on File Prior to Encounter   Medication Sig    acyclovir (ZOVIRAX) 800 MG Tab Take 1 tablet (800 mg total) by mouth 5 (five) times daily. for 7 days     Family History     Problem Relation (Age of Onset)    Heart disease Mother    Hypertension Mother        Tobacco Use    Smoking status: Current Every Day Smoker     Packs/day: 0.50     Types: Cigarettes    Smokeless tobacco: Never Used    Tobacco comment: Patient asking for nicotine patch   Substance and Sexual Activity    Alcohol use: Yes     Alcohol/week: 3.0 standard drinks     Types: 3 Cans of beer per week    Drug use: Not Currently    Sexual activity: Not on file     Review of Systems   Unable to perform ROS: Mental status change     Objective:     Vital Signs (Most Recent):  Temp: 98.2 °F (36.8 °C) (08/28/20 1530)  Pulse: 94 (08/28/20 1724)  Resp: 14 (08/28/20 1724)  BP: 121/66 (08/28/20 1724)  SpO2: 95 % (08/28/20 1724) Vital Signs (24h Range):  Temp:  [98.2 °F (36.8 °C)] 98.2 °F (36.8 °C)  Pulse:  [] 94  Resp:  [12-14] 14  SpO2:  [95 %-97 %] 95 %  BP: (121-134)/(66-71) 121/66     Weight: 78 kg (172 lb)  Body mass index is 23.99 kg/m².    Physical Exam  Constitutional:       General: He is not in acute distress.     Appearance: Normal appearance. He is normal weight. He is ill-appearing. He is not toxic-appearing or diaphoretic.      Comments: Patient shaking vigorously, uncooperative during examination     HENT:      Head: Normocephalic and atraumatic.      Nose: No congestion or rhinorrhea.      Mouth/Throat:      Mouth: Mucous membranes are dry.       Pharynx: No oropharyngeal exudate or posterior oropharyngeal erythema.   Eyes:      General: Scleral icterus present.         Right eye: No discharge.         Left eye: No discharge.   Neck:      Musculoskeletal: No neck rigidity or muscular tenderness.   Cardiovascular:      Rate and Rhythm: Normal rate and regular rhythm.      Pulses: Normal pulses.      Heart sounds: Normal heart sounds. No murmur. No friction rub. No gallop.    Pulmonary:      Effort: Pulmonary effort is normal. No respiratory distress.      Breath sounds: Normal breath sounds. No stridor. No wheezing, rhonchi or rales.   Chest:      Chest wall: No tenderness.   Abdominal:      General: Abdomen is flat. Bowel sounds are normal. There is no distension.      Palpations: Abdomen is soft. There is no mass.      Tenderness: There is abdominal tenderness. There is no guarding.      Comments: Left lower quadrant tenderness with deep palpation. Flat abdomen with vertical central surgical scar.    Musculoskeletal:      Right lower leg: Edema present.      Left lower leg: Edema present.      Comments: Bilateral lower extremity pitting edema to the knee   Lymphadenopathy:      Cervical: No cervical adenopathy.   Skin:     General: Skin is warm and dry.      Coloration: Skin is jaundiced.      Findings: Bruising present.      Comments: Hyperpigmented skin throughout, particularly lower extremities.    Echymomoses on bilateral forearms.    Multiple spider naevi on upper chest.   Neurological:      General: No focal deficit present.      Mental Status: He is alert.      Comments: [place] to self and place             Significant Labs: All pertinent labs within the past 24 hours have been reviewed.    Significant Imaging: I have reviewed all pertinent imaging results/findings within the past 24 hours.

## 2020-08-29 NOTE — ASSESSMENT & PLAN NOTE
Nii Davidson is a 59 year old male with history of hepatitis C cirrhosis, opiate abuse, and hypertension who presented with altered mental status. Supposed history of liver cancer. Limited history and records. There is a broad differential for altered mental status but most likely related to hepatic encephalopathy at this time considering known liver disease with elevated ammonia 155 and worsening PT/INR from July labs. Other etiologies of altered mental status ruled out or being treated. MRI liver consistent with nodular heterogenous liver parenchyma consistent with cirrhosis and a nonspecific mass in segment 6 that is ~3.9 cm.    Plan:  - Daily CBC, CMP, PTT, PT/INR  - Continue vancomycin and zosyn  - Lactulose and rifaximin  - Vitamin K IV

## 2020-08-29 NOTE — ASSESSMENT & PLAN NOTE
Nii Davidson is a 59 year old male with history of hepatitis C cirrhosis, opiate abuse, and hypertension who presented with altered mental status. Supposed history of liver cancer. Limited history and records. There is a broad differential but most likely related to hepatic encephalopathy at this time considering known liver disease with elevated ammonia 155 and worsening PT/INR from July labs However, etiology include infection particularly spontaneously bacterial peritonitis. Patient has nondistended flat abdomen but there is some left lower quadrant tenderness. Patient has leukocytosis and shaking, which could be interpreted as rigors but no fever. Concern of intoxicant or drug related mental status in the setting of recent hydromorphone use. Negative ethanol. Admission CMP similar to previous aside from new acute kidney injury. Saturating >95% on room air.     Plan:  - Daily CBC, CMP, Mg, Phos, PTT, PT/INR  - Start vancomycin and piperacillin tazobactam due to potential of infection with unclear etiology  - CT head without contrast, ultrasound liver doppler, chest xray  - Urine toxicology, urinalysis

## 2020-08-29 NOTE — ASSESSMENT & PLAN NOTE
Cr 2.3 from 1.1 a month ago. Unclear etiology at this time but must consider prerenal KRISTA as well as HRS.  Plan:  - Urine electrolytes and retroperitoneal ultrasound  - Will hold fluids for now in the setting of cirrhosis and edematous state

## 2020-08-29 NOTE — SUBJECTIVE & OBJECTIVE
Interval History: No acute events overnight and patient hemodynamically stable. He continues to be somnolent but occasionally has moments of lucidity.     Review of Systems   Unable to perform ROS: Mental status change     Objective:     Vital Signs (Most Recent):  Temp: 96.1 °F (35.6 °C) (08/29/20 1134)  Pulse: 82 (08/29/20 1134)  Resp: 13 (08/29/20 0720)  BP: 110/62 (08/29/20 1134)  SpO2: 96 % (08/29/20 1134) Vital Signs (24h Range):  Temp:  [96.1 °F (35.6 °C)-98.8 °F (37.1 °C)] 96.1 °F (35.6 °C)  Pulse:  [82-96] 82  Resp:  [11-16] 13  SpO2:  [95 %-98 %] 96 %  BP: ()/(62-77) 110/62     Weight: 68.3 kg (150 lb 9.2 oz)  Body mass index is 21 kg/m².    Intake/Output Summary (Last 24 hours) at 8/29/2020 1547  Last data filed at 8/29/2020 0800  Gross per 24 hour   Intake 600 ml   Output 500 ml   Net 100 ml      Physical Exam  Constitutional:       General: He is not in acute distress.     Appearance: Normal appearance. He is normal weight. He is ill-appearing. He is not toxic-appearing or diaphoretic.      Comments: Patient shaking vigorously, uncooperative during examination     HENT:      Head: Normocephalic and atraumatic.      Nose: No congestion or rhinorrhea.      Mouth/Throat:      Mouth: Mucous membranes are dry.      Pharynx: No oropharyngeal exudate or posterior oropharyngeal erythema.   Eyes:      General: Scleral icterus present.         Right eye: No discharge.         Left eye: No discharge.   Neck:      Musculoskeletal: No neck rigidity or muscular tenderness.   Cardiovascular:      Rate and Rhythm: Normal rate and regular rhythm.      Pulses: Normal pulses.      Heart sounds: Normal heart sounds. No murmur. No friction rub. No gallop.    Pulmonary:      Effort: Pulmonary effort is normal. No respiratory distress.      Breath sounds: Normal breath sounds. No stridor. No wheezing, rhonchi or rales.   Chest:      Chest wall: No tenderness.   Abdominal:      General: Abdomen is flat. Bowel sounds are  normal. There is no distension.      Palpations: Abdomen is soft. There is no mass.      Tenderness: There is no abdominal tenderness. There is no guarding.      Comments: Left lower quadrant tenderness with deep palpation. Flat abdomen with vertical central surgical scar.    Musculoskeletal:      Right lower leg: Edema present.      Left lower leg: Edema present.      Comments: Bilateral lower extremity pitting edema to the knee   Lymphadenopathy:      Cervical: No cervical adenopathy.   Skin:     General: Skin is warm and dry.      Coloration: Skin is jaundiced.      Findings: Bruising present.      Comments: Hyperpigmented skin throughout, particularly lower extremities.    Echymomoses on bilateral forearms.    Multiple spider naevi on upper chest.   Neurological:      General: No focal deficit present.      Mental Status: He is alert.      Comments: [place] to self and place         Significant Labs: All pertinent labs within the past 24 hours have been reviewed.    Significant Imaging: I have reviewed and interpreted all pertinent imaging results/findings within the past 24 hours.

## 2020-08-30 PROBLEM — K70.30 ALCOHOLIC CIRRHOSIS OF LIVER WITHOUT ASCITES: Status: ACTIVE | Noted: 2020-08-30

## 2020-08-30 PROBLEM — D69.6 THROMBOCYTOPENIA: Status: ACTIVE | Noted: 2020-08-30

## 2020-08-30 PROBLEM — R16.0 LIVER MASS: Status: ACTIVE | Noted: 2020-08-30

## 2020-08-30 PROBLEM — D68.9 COAGULOPATHY: Status: ACTIVE | Noted: 2020-08-30

## 2020-08-30 PROBLEM — K70.31 ALCOHOLIC CIRRHOSIS OF LIVER WITH ASCITES: Status: ACTIVE | Noted: 2020-08-30

## 2020-08-30 LAB
ALBUMIN SERPL BCP-MCNC: 1.8 G/DL (ref 3.5–5.2)
ALP SERPL-CCNC: 60 U/L (ref 55–135)
ALT SERPL W/O P-5'-P-CCNC: 43 U/L (ref 10–44)
ANION GAP SERPL CALC-SCNC: 4 MMOL/L (ref 8–16)
ANISOCYTOSIS BLD QL SMEAR: SLIGHT
APTT BLDCRRT: 46.1 SEC (ref 21–32)
AST SERPL-CCNC: 112 U/L (ref 10–40)
BASOPHILS # BLD AUTO: 0.05 K/UL (ref 0–0.2)
BASOPHILS NFR BLD: 0.5 % (ref 0–1.9)
BILIRUB SERPL-MCNC: 2.9 MG/DL (ref 0.1–1)
BUN SERPL-MCNC: 21 MG/DL (ref 6–20)
BURR CELLS BLD QL SMEAR: ABNORMAL
CALCIUM SERPL-MCNC: 7.5 MG/DL (ref 8.7–10.5)
CHLORIDE SERPL-SCNC: 106 MMOL/L (ref 95–110)
CO2 SERPL-SCNC: 24 MMOL/L (ref 23–29)
CREAT SERPL-MCNC: 0.9 MG/DL (ref 0.5–1.4)
DIFFERENTIAL METHOD: ABNORMAL
EOSINOPHIL # BLD AUTO: 0.1 K/UL (ref 0–0.5)
EOSINOPHIL NFR BLD: 0.8 % (ref 0–8)
ERYTHROCYTE [DISTWIDTH] IN BLOOD BY AUTOMATED COUNT: 14.3 % (ref 11.5–14.5)
EST. GFR  (AFRICAN AMERICAN): >60 ML/MIN/1.73 M^2
EST. GFR  (NON AFRICAN AMERICAN): >60 ML/MIN/1.73 M^2
FERRITIN SERPL-MCNC: 607 NG/ML (ref 20–300)
GLUCOSE SERPL-MCNC: 93 MG/DL (ref 70–110)
HCT VFR BLD AUTO: 32.8 % (ref 40–54)
HGB BLD-MCNC: 11.2 G/DL (ref 14–18)
IMM GRANULOCYTES # BLD AUTO: 0.23 K/UL (ref 0–0.04)
IMM GRANULOCYTES NFR BLD AUTO: 2.3 % (ref 0–0.5)
INR PPP: 1.7 (ref 0.8–1.2)
LYMPHOCYTES # BLD AUTO: 0.8 K/UL (ref 1–4.8)
LYMPHOCYTES NFR BLD: 8.1 % (ref 18–48)
MCH RBC QN AUTO: 37.5 PG (ref 27–31)
MCHC RBC AUTO-ENTMCNC: 34.1 G/DL (ref 32–36)
MCV RBC AUTO: 110 FL (ref 82–98)
MONOCYTES # BLD AUTO: 0.9 K/UL (ref 0.3–1)
MONOCYTES NFR BLD: 9.3 % (ref 4–15)
NEUTROPHILS # BLD AUTO: 8 K/UL (ref 1.8–7.7)
NEUTROPHILS NFR BLD: 79 % (ref 38–73)
NRBC BLD-RTO: 0 /100 WBC
PLATELET # BLD AUTO: 67 K/UL (ref 150–350)
PLATELET BLD QL SMEAR: ABNORMAL
PMV BLD AUTO: 10.6 FL (ref 9.2–12.9)
POIKILOCYTOSIS BLD QL SMEAR: SLIGHT
POTASSIUM SERPL-SCNC: 3.5 MMOL/L (ref 3.5–5.1)
PROT SERPL-MCNC: 5.9 G/DL (ref 6–8.4)
PROTHROMBIN TIME: 18.5 SEC (ref 9–12.5)
RBC # BLD AUTO: 2.99 M/UL (ref 4.6–6.2)
SODIUM SERPL-SCNC: 134 MMOL/L (ref 136–145)
VANCOMYCIN SERPL-MCNC: 6.3 UG/ML
WBC # BLD AUTO: 10.05 K/UL (ref 3.9–12.7)

## 2020-08-30 PROCEDURE — 11000001 HC ACUTE MED/SURG PRIVATE ROOM

## 2020-08-30 PROCEDURE — 25000003 PHARM REV CODE 250: Performed by: HOSPITALIST

## 2020-08-30 PROCEDURE — 82728 ASSAY OF FERRITIN: CPT

## 2020-08-30 PROCEDURE — 25000003 PHARM REV CODE 250: Performed by: STUDENT IN AN ORGANIZED HEALTH CARE EDUCATION/TRAINING PROGRAM

## 2020-08-30 PROCEDURE — 63600175 PHARM REV CODE 636 W HCPCS: Performed by: INTERNAL MEDICINE

## 2020-08-30 PROCEDURE — 99233 SBSQ HOSP IP/OBS HIGH 50: CPT | Mod: ,,, | Performed by: HOSPITALIST

## 2020-08-30 PROCEDURE — 99232 PR SUBSEQUENT HOSPITAL CARE,LEVL II: ICD-10-PCS | Mod: ,,, | Performed by: INTERNAL MEDICINE

## 2020-08-30 PROCEDURE — 63600175 PHARM REV CODE 636 W HCPCS: Performed by: STUDENT IN AN ORGANIZED HEALTH CARE EDUCATION/TRAINING PROGRAM

## 2020-08-30 PROCEDURE — 25500020 PHARM REV CODE 255: Performed by: HOSPITALIST

## 2020-08-30 PROCEDURE — 99232 SBSQ HOSP IP/OBS MODERATE 35: CPT | Mod: ,,, | Performed by: INTERNAL MEDICINE

## 2020-08-30 PROCEDURE — 80202 ASSAY OF VANCOMYCIN: CPT

## 2020-08-30 PROCEDURE — 85730 THROMBOPLASTIN TIME PARTIAL: CPT

## 2020-08-30 PROCEDURE — 80053 COMPREHEN METABOLIC PANEL: CPT

## 2020-08-30 PROCEDURE — 85610 PROTHROMBIN TIME: CPT

## 2020-08-30 PROCEDURE — 36415 COLL VENOUS BLD VENIPUNCTURE: CPT

## 2020-08-30 PROCEDURE — 85025 COMPLETE CBC W/AUTO DIFF WBC: CPT

## 2020-08-30 PROCEDURE — 99233 PR SUBSEQUENT HOSPITAL CARE,LEVL III: ICD-10-PCS | Mod: ,,, | Performed by: HOSPITALIST

## 2020-08-30 PROCEDURE — A9585 GADOBUTROL INJECTION: HCPCS | Performed by: HOSPITALIST

## 2020-08-30 PROCEDURE — 94761 N-INVAS EAR/PLS OXIMETRY MLT: CPT

## 2020-08-30 RX ORDER — THIAMINE HCL 100 MG
100 TABLET ORAL DAILY
Status: DISCONTINUED | OUTPATIENT
Start: 2020-08-31 | End: 2020-09-02 | Stop reason: HOSPADM

## 2020-08-30 RX ORDER — LACTULOSE 10 G/15ML
200 SOLUTION ORAL; RECTAL ONCE
Status: COMPLETED | OUTPATIENT
Start: 2020-08-30 | End: 2020-08-30

## 2020-08-30 RX ORDER — VANCOMYCIN HCL IN 5 % DEXTROSE 1G/250ML
15 PLASTIC BAG, INJECTION (ML) INTRAVENOUS
Status: DISCONTINUED | OUTPATIENT
Start: 2020-08-30 | End: 2020-08-30

## 2020-08-30 RX ORDER — LACTULOSE 10 G/15ML
30 SOLUTION ORAL 3 TIMES DAILY
Status: DISCONTINUED | OUTPATIENT
Start: 2020-08-30 | End: 2020-09-02 | Stop reason: HOSPADM

## 2020-08-30 RX ORDER — ZINC SULFATE 50(220)MG
220 CAPSULE ORAL DAILY
Status: DISCONTINUED | OUTPATIENT
Start: 2020-08-31 | End: 2020-09-02 | Stop reason: HOSPADM

## 2020-08-30 RX ORDER — GADOBUTROL 604.72 MG/ML
10 INJECTION INTRAVENOUS
Status: COMPLETED | OUTPATIENT
Start: 2020-08-30 | End: 2020-08-30

## 2020-08-30 RX ORDER — FOLIC ACID 1 MG/1
1 TABLET ORAL DAILY
Status: DISCONTINUED | OUTPATIENT
Start: 2020-08-31 | End: 2020-09-02 | Stop reason: HOSPADM

## 2020-08-30 RX ADMIN — VANCOMYCIN HYDROCHLORIDE 1000 MG: 1 INJECTION, POWDER, LYOPHILIZED, FOR SOLUTION INTRAVENOUS at 10:08

## 2020-08-30 RX ADMIN — LACTULOSE 200 G: 10 SOLUTION ORAL at 09:08

## 2020-08-30 RX ADMIN — RIFAXIMIN 550 MG: 550 TABLET ORAL at 09:08

## 2020-08-30 RX ADMIN — VANCOMYCIN HYDROCHLORIDE 1000 MG: 1 INJECTION, POWDER, LYOPHILIZED, FOR SOLUTION INTRAVENOUS at 09:08

## 2020-08-30 RX ADMIN — PHYTONADIONE 10 MG: 10 INJECTION, EMULSION INTRAMUSCULAR; INTRAVENOUS; SUBCUTANEOUS at 08:08

## 2020-08-30 RX ADMIN — PIPERACILLIN SODIUM AND TAZOBACTAM SODIUM 4.5 G: 4; .5 INJECTION, POWDER, LYOPHILIZED, FOR SOLUTION INTRAVENOUS at 05:08

## 2020-08-30 RX ADMIN — LACTULOSE 20 G: 20 SOLUTION ORAL at 04:08

## 2020-08-30 RX ADMIN — PIPERACILLIN SODIUM AND TAZOBACTAM SODIUM 4.5 G: 4; .5 INJECTION, POWDER, LYOPHILIZED, FOR SOLUTION INTRAVENOUS at 08:08

## 2020-08-30 RX ADMIN — LACTULOSE 20 G: 20 SOLUTION ORAL at 08:08

## 2020-08-30 RX ADMIN — PIPERACILLIN SODIUM AND TAZOBACTAM SODIUM 4.5 G: 4; .5 INJECTION, POWDER, LYOPHILIZED, FOR SOLUTION INTRAVENOUS at 01:08

## 2020-08-30 RX ADMIN — RIFAXIMIN 550 MG: 550 TABLET ORAL at 08:08

## 2020-08-30 RX ADMIN — LACTULOSE 30 G: 20 SOLUTION ORAL at 09:08

## 2020-08-30 RX ADMIN — GADOBUTROL 10 ML: 604.72 INJECTION INTRAVENOUS at 06:08

## 2020-08-30 NOTE — PLAN OF CARE
SW attempted to complete assessment with patient. Patient was asleep. SW will follow up with patient at a later time. SW will continue to follow.     Eden Hammond LMSW   - [facility]  Ext. [phone]

## 2020-08-30 NOTE — PLAN OF CARE
No acute events throughout night. Pt  able to express needs. Refused to get IV this am for IV Zosyn.  On call up to floor to speak with patient.  Agreed to IV and IV ABX.  Given as ordered.  Tolerated well.  Safety maintained.  Bed in low position,  call  light in reach.    Will continue to monitor

## 2020-08-30 NOTE — PROGRESS NOTES
Pharmacokinetic Assessment Follow Up: IV Vancomycin    Vancomycin serum concentration assessment(s):    Obtained two vancomycin concentrations to assess clearance given KRISTA which has now fully resolved on labs.  Current vancomycin concentration = 6.3 mcg/mL    Vancomycin Regimen Plan:  1. Vancomycin 1000 mg (15 mg/kg) IV q12h  2. Obtain trough on 9/1 @ 0800  3. Goal trough = 15-20 mcg/mL    Drug levels (last 3 results):  Recent Labs   Lab Result Units 08/29/20  0829 08/30/20  0411   Vancomycin, Random ug/mL 16.5 6.3       Thank you for the consult, will continue to follow  Augusto López Pharm.D., BCPS  [id number]       Patient brief summary:  Nii Davidson is a 59 y.o. male initiated on antimicrobial therapy with IV Vancomycin for treatment of sepsis    Drug Allergies:   Review of patient's allergies indicates:  No Known Allergies    Actual Body Weight:   68.8 kg    Renal Function:   Estimated Creatinine Clearance: 86 mL/min (based on SCr of 0.9 mg/dL).,     CBC (last 72 hours):  Recent Labs   Lab Result Units 08/28/20  1552 08/29/20  0358 08/30/20  0411   WBC K/uL 14.30* 14.49* 10.05   Hemoglobin g/dL 11.7* 10.3* 11.2*   Hematocrit % 33.6* 30.2* 32.8*   Platelets K/uL 86* 71* 67*   Gran% % 88.0* 83.6* 79.0*   Lymph% % 3.0* 6.6* 8.1*   Mono% % 7.8 7.0 9.3   Eosinophil% % 0.1 0.3 0.8   Basophil% % 0.3 0.4 0.5   Differential Method  Automated Automated Automated       Metabolic Panel (last 72 hours):  Recent Labs   Lab Result Units 08/28/20  1552 08/28/20  2040 08/29/20  0358 08/29/20  1651 08/30/20  0411   Sodium mmol/L 132*  --  127*  --  134*   Sodium Urine Random mmol/L  --  <20*  --  <20*  --    Potassium mmol/L 4.1  --  3.5  --  3.5   Chloride mmol/L 103  --  101  --  106   CO2 mmol/L 19*  --  19*  --  24   Glucose mg/dL 66*  --  80  --  93   Glucose, UA   --   --   --  Negative  --    BUN, Bld mg/dL 24*  --  27*  --  21*   Creatinine mg/dL 2.3*  --  1.6*  --  0.9   Creatinine, Random Ur mg/dL  --  294.0  294.0  --    --   --    Albumin g/dL 2.4*  --  1.9*  --  1.8*   Total Bilirubin mg/dL 3.4*  --  2.4*  --  2.9*   Alkaline Phosphatase U/L 63  --  62  --  60   AST U/L 125*  --  109*  --  112*   ALT U/L 47*  --  39  --  43       Vancomycin Administrations:  vancomycin given in the last 96 hours                   vancomycin 2 g in dextrose 5 % 500 mL IVPB (mg) 2,000 mg New Bag 08/28/20 2229                Microbiologic Results:  Microbiology Results (last 7 days)     Procedure Component Value Units Date/Time    Blood culture [[id number]] Collected: 08/28/20 2013    Order Status: Completed Specimen: Blood from Peripheral, Left Arm Updated: 08/29/20 2212     Blood Culture, Routine No Growth to date      No Growth to date    Blood culture [[id number]] Collected: 08/28/20 2013    Order Status: Completed Specimen: Blood from Peripheral, Right Hand Updated: 08/29/20 2212     Blood Culture, Routine No Growth to date      No Growth to date

## 2020-08-30 NOTE — PROGRESS NOTES
[facility]  Hepatology  Consult/progress note    Patient Name: Nii Davidson  MRN: [id number]  Admission Date: 8/28/2020  Hospital Length of Stay: 2 days  Code Status: Full Code   Attending Provider: Dylon Monterroso MD   Consulting Provider: Yoel David MD  Primary Care Physician: Nancy Garcia NP  Principal Problem:<principal problem not specified>    Consults  Subjective:       He denies acute complaints. He denies fevers, chills, night sweats, he is alert and oriented but somnolent.     Past Medical History:   Diagnosis Date    Cirrhosis     Encounter for blood transfusion     Hepatitis C     Hypertension        Past Surgical History:   Procedure Laterality Date    EXPLORATORY LAPAROTOMY  1989       Family History   Problem Relation Age of Onset    Hypertension Mother     Heart disease Mother        Social History     Socioeconomic History    Marital status: Single     Spouse name: Not on file    Number of children: Not on file    Years of education: Not on file    Highest education level: Not on file   Occupational History    Not on file   Social Needs    Financial resource strain: Not on file    Food insecurity     Worry: Not on file     Inability: Not on file    Transportation needs     Medical: Not on file     Non-medical: Not on file   Tobacco Use    Smoking status: Current Every Day Smoker     Packs/day: 0.50     Types: Cigarettes    Smokeless tobacco: Never Used    Tobacco comment: Patient asking for nicotine patch   Substance and Sexual Activity    Alcohol use: Yes     Alcohol/week: 3.0 standard drinks     Types: 3 Cans of beer per week    Drug use: Not Currently    Sexual activity: Not on file   Lifestyle    Physical activity     Days per week: Not on file     Minutes per session: Not on file    Stress: Not on file   Relationships    Social connections     Talks on phone: Not on file     Gets together: Not on file     Attends Congregational  service: Not on file     Active member of club or organization: Not on file     Attends meetings of clubs or organizations: Not on file     Relationship status: Not on file   Other Topics Concern    Not on file   Social History Narrative    Not on file       No current facility-administered medications on file prior to encounter.      Current Outpatient Medications on File Prior to Encounter   Medication Sig Dispense Refill    acyclovir (ZOVIRAX) 800 MG Tab Take 1 tablet (800 mg total) by mouth 5 (five) times daily. for 7 days 35 tablet 0       Review of patient's allergies indicates:  No Known Allergies    Review of Systems   Unable to perform ROS: Mental status change        Objective:     Vitals:    08/30/20 0848   BP: 120/62   Pulse: 75   Resp: 16   Temp:          Physical Exam  Vitals signs reviewed.   Constitutional:       General: He is in acute distress.      Appearance: He is ill-appearing and diaphoretic.      Comments: tremulous   HENT:      Head: Normocephalic and atraumatic.   Eyes:      General: Scleral icterus present.      Conjunctiva/sclera: Conjunctivae normal.   Neck:      Musculoskeletal: Neck supple.   Cardiovascular:      Rate and Rhythm: Normal rate and regular rhythm.   Pulmonary:      Effort: Pulmonary effort is normal.      Breath sounds: Normal breath sounds.   Abdominal:      General: Bowel sounds are normal. There is no distension.      Palpations: Abdomen is soft. There is no mass.      Tenderness: There is no abdominal tenderness. There is no guarding or rebound.   Musculoskeletal:      Right lower leg: No edema.      Left lower leg: No edema.   Skin:     General: Skin is warm.   Neurological:      Mental Status: He is alert. He is disoriented.      Comments: [place] present          Significant Labs:  Recent Labs   Lab 08/28/20  1552 08/29/20  0358 08/30/20  0411   HGB 11.7* 10.3* 11.2*       Lab Results   Component Value Date    WBC 10.05 08/30/2020    HGB 11.2 (L) 08/30/2020     HCT 32.8 (L) 08/30/2020     (H) 08/30/2020    PLT 67 (L) 08/30/2020       Lab Results   Component Value Date     (L) 08/30/2020    K 3.5 08/30/2020     08/30/2020    CO2 24 08/30/2020    BUN 21 (H) 08/30/2020    CREATININE 0.9 08/30/2020    CALCIUM 7.5 (L) 08/30/2020    ANIONGAP 4 (L) 08/30/2020    ESTGFRAFRICA >60.0 08/30/2020    EGFRNONAA >60.0 08/30/2020       Lab Results   Component Value Date    ALT 43 08/30/2020     (H) 08/30/2020    ALKPHOS 60 08/30/2020    BILITOT 2.9 (H) 08/30/2020       Lab Results   Component Value Date    INR 1.7 (H) 08/30/2020    INR 2.2 (H) 08/29/2020    INR 1.9 (H) 08/28/2020           Significant Imaging:  Reviewed pertinent radiology findings.       Assessment/Plan:       MELD-Na score: 18 at 8/30/2020  4:11 AM  MELD score: 16 at 8/30/2020  4:11 AM  Calculated from:  Serum Creatinine: 0.9 mg/dL (Rounded to 1 mg/dL) at 8/30/2020  4:11 AM  Serum Sodium: 134 mmol/L at 8/30/2020  4:11 AM  Total Bilirubin: 2.9 mg/dL at 8/30/2020  4:11 AM  INR(ratio): 1.7 at 8/30/2020  4:11 AM  Age: 59 years 11 months    Problem List:  1. Cirrhosis decompensated with HE and ascites and EV  2. HCV, possible Etoh(PETH pending), polysubstance abuse(presumed opioids, positive of Utox)  3. Possible HCC 3.9 cm on MRI w/o contrast , with elevated AFP(104)  4. PVT  5. KRISTA likely prerenal given Cindy <20, improved with albumin.   6. Hyponatremia, resolved  7. Coagulopathy, improving  8. Leukocytosis, improved  9. HBVsAB positive, HBVcAB total positive, meaning immune to Hep B but had infection in past.     Patient had MRI yesterday without contrast, Nodular heterogeneous liver parenchyma suggestive of cirrhosis with nonspecific mass in segment 6 of the liver measuring 3.9 cm.  Further evaluation with contrasted study either CT or MRI is recommended. KRISTA improved with albumin. Leukocytosis improved, no cultures grown so far, para not done yet. Hyponatremia resolved.     Plan:  -KRISTA improved,  likely prerenal improved with albumin: albumin 1g per body weight daily in divided doses, for 1 more day, hold diuretics,strict I/O, daily weights.   - Ascites: mild  paracentesis to r/o SBP; 2 gram Na restrict; hold diuretics in setting of KRISTA  - Esophageal varices: last EGD 2014, grade 2 EV, banded, needs repeat EGD for variceal surveillance,   -PVT: will need to determine if he can be compliant with anticoagulation.   - Hepatic encephalopathy: present  lactulose titrated to 2-3 BMs daily, add rifaximin 550 twice daily  - HCC screening: order MRI triple phase given lesion on US  coagulopathy: Vitamin K 10 IV X 3 days.   - Vaccination status: check HAV total Ab, and HbsAb if neg, will need HAV/HBV vaccine outpatient. But appears patient is immune to Hep B  - Transplant: have not started transplant eval. Echo with bubbles, addiction psych to evaluate. Will order serologies to r/o other causes of cirrhosis. MRI with triple phase pending.     Please obtain daily CBC, BMP, LFT, INR  Thank you for involving us in the care of Nii Davidson. Please call with any additional questions, concerns or changes in the patient's clinical status.    Yoel David MD  Gastroenterology Fellow PGY IV   [facility]

## 2020-08-31 PROBLEM — C78.00 HEPATOCELLULAR CARCINOMA METASTATIC TO LUNG: Status: ACTIVE | Noted: 2020-08-31

## 2020-08-31 PROBLEM — C22.0 HCC (HEPATOCELLULAR CARCINOMA): Status: ACTIVE | Noted: 2020-08-31

## 2020-08-31 LAB
ALBUMIN SERPL BCP-MCNC: 1.7 G/DL (ref 3.5–5.2)
ALP SERPL-CCNC: 68 U/L (ref 55–135)
ALT SERPL W/O P-5'-P-CCNC: 40 U/L (ref 10–44)
ANA PATTERN 1: NORMAL
ANA SER QL IF: POSITIVE
ANA TITR SER IF: NORMAL {TITER}
ANION GAP SERPL CALC-SCNC: 5 MMOL/L (ref 8–16)
ANISOCYTOSIS BLD QL SMEAR: SLIGHT
APTT BLDCRRT: 42.8 SEC (ref 21–32)
ASCENDING AORTA: 3.03 CM
AST SERPL-CCNC: 95 U/L (ref 10–40)
AV INDEX (PROSTH): 0.44
AV MEAN GRADIENT: 11 MMHG
AV PEAK GRADIENT: 19 MMHG
AV VALVE AREA: 1.5 CM2
AV VELOCITY RATIO: 0.42
BASOPHILS # BLD AUTO: 0.03 K/UL (ref 0–0.2)
BASOPHILS NFR BLD: 0.4 % (ref 0–1.9)
BILIRUB SERPL-MCNC: 2.8 MG/DL (ref 0.1–1)
BSA FOR ECHO PROCEDURE: 1.85 M2
BUN SERPL-MCNC: 15 MG/DL (ref 6–20)
CALCIUM SERPL-MCNC: 7.2 MG/DL (ref 8.7–10.5)
CHLORIDE SERPL-SCNC: 106 MMOL/L (ref 95–110)
CO2 SERPL-SCNC: 21 MMOL/L (ref 23–29)
CREAT SERPL-MCNC: 0.7 MG/DL (ref 0.5–1.4)
CV ECHO LV RWT: 0.57 CM
DIFFERENTIAL METHOD: ABNORMAL
DOP CALC AO PEAK VEL: 2.18 M/S
DOP CALC AO VTI: 36.38 CM
DOP CALC LVOT AREA: 3.4 CM2
DOP CALC LVOT DIAMETER: 2.08 CM
DOP CALC LVOT PEAK VEL: 0.92 M/S
DOP CALC LVOT STROKE VOLUME: 54.51 CM3
DOP CALCLVOT PEAK VEL VTI: 16.05 CM
E WAVE DECELERATION TIME: 246.99 MSEC
E/A RATIO: 0.91
E/E' RATIO: 10 M/S
ECHO LV POSTERIOR WALL: 0.95 CM (ref 0.6–1.1)
EOSINOPHIL # BLD AUTO: 0.1 K/UL (ref 0–0.5)
EOSINOPHIL NFR BLD: 0.6 % (ref 0–8)
ERYTHROCYTE [DISTWIDTH] IN BLOOD BY AUTOMATED COUNT: 14.4 % (ref 11.5–14.5)
EST. GFR  (AFRICAN AMERICAN): >60 ML/MIN/1.73 M^2
EST. GFR  (NON AFRICAN AMERICAN): >60 ML/MIN/1.73 M^2
FIBRINOGEN PPP-MCNC: 161 MG/DL (ref 182–366)
FRACTIONAL SHORTENING: 33 % (ref 28–44)
GLUCOSE SERPL-MCNC: 89 MG/DL (ref 70–110)
HAPTOGLOB SERPL-MCNC: 30 MG/DL (ref 30–250)
HAV IGM SERPL QL IA: NEGATIVE
HBV CORE IGM SERPL QL IA: NEGATIVE
HBV SURFACE AG SERPL QL IA: NEGATIVE
HCT VFR BLD AUTO: 30.3 % (ref 40–54)
HCV AB SERPL QL IA: POSITIVE
HCV GENTYP SERPL NAA+PROBE: NORMAL
HCV RNA SERPL NAA+PROBE-LOG IU: <1.08 LOG (10) IU/ML
HCV RNA SERPL QL NAA+PROBE: NOT DETECTED
HCV RNA SPEC NAA+PROBE-ACNC: <12 IU/ML
HGB BLD-MCNC: 10.5 G/DL (ref 14–18)
HIV 1+2 AB+HIV1 P24 AG SERPL QL IA: NEGATIVE
HSV-1 DNA BY PCR: NEGATIVE
HSV-2 DNA BY PCR: NEGATIVE
HYPOCHROMIA BLD QL SMEAR: ABNORMAL
IMM GRANULOCYTES # BLD AUTO: 0.04 K/UL (ref 0–0.04)
IMM GRANULOCYTES NFR BLD AUTO: 0.5 % (ref 0–0.5)
INR PPP: 1.4 (ref 0.8–1.2)
INTERVENTRICULAR SEPTUM: 0.93 CM (ref 0.6–1.1)
IVRT: 65.65 MSEC
LA MAJOR: 4.62 CM
LA MINOR: 4.88 CM
LA WIDTH: 4.26 CM
LDH SERPL L TO P-CCNC: 312 U/L (ref 110–260)
LEFT ATRIUM SIZE: 4.77 CM
LEFT ATRIUM VOLUME INDEX: 43.8 ML/M2
LEFT ATRIUM VOLUME: 81.98 CM3
LEFT INTERNAL DIMENSION IN SYSTOLE: 2.22 CM (ref 2.1–4)
LEFT VENTRICLE DIASTOLIC VOLUME INDEX: 24.02 ML/M2
LEFT VENTRICLE DIASTOLIC VOLUME: 44.96 ML
LEFT VENTRICLE MASS INDEX: 47 G/M2
LEFT VENTRICLE SYSTOLIC VOLUME INDEX: 8.8 ML/M2
LEFT VENTRICLE SYSTOLIC VOLUME: 16.52 ML
LEFT VENTRICULAR INTERNAL DIMENSION IN DIASTOLE: 3.33 CM (ref 3.5–6)
LEFT VENTRICULAR MASS: 87.54 G
LV LATERAL E/E' RATIO: 8.08 M/S
LV SEPTAL E/E' RATIO: 13.13 M/S
LYMPHOCYTES # BLD AUTO: 0.8 K/UL (ref 1–4.8)
LYMPHOCYTES NFR BLD: 10.6 % (ref 18–48)
MCH RBC QN AUTO: 37.2 PG (ref 27–31)
MCHC RBC AUTO-ENTMCNC: 34.7 G/DL (ref 32–36)
MCV RBC AUTO: 107 FL (ref 82–98)
MITOCHONDRIA AB TITR SER IF: NORMAL {TITER}
MONOCYTES # BLD AUTO: 1.3 K/UL (ref 0.3–1)
MONOCYTES NFR BLD: 17.2 % (ref 4–15)
MV PEAK A VEL: 1.16 M/S
MV PEAK E VEL: 1.05 M/S
MV STENOSIS PRESSURE HALF TIME: 71.63 MS
MV VALVE AREA P 1/2 METHOD: 3.07 CM2
NEUTROPHILS # BLD AUTO: 5.5 K/UL (ref 1.8–7.7)
NEUTROPHILS NFR BLD: 70.7 % (ref 38–73)
NRBC BLD-RTO: 0 /100 WBC
OVALOCYTES BLD QL SMEAR: ABNORMAL
PISA TR MAX VEL: 2.49 M/S
PLATELET # BLD AUTO: 80 K/UL (ref 150–350)
PMV BLD AUTO: 10.3 FL (ref 9.2–12.9)
POIKILOCYTOSIS BLD QL SMEAR: SLIGHT
POLYCHROMASIA BLD QL SMEAR: ABNORMAL
POTASSIUM SERPL-SCNC: 3.1 MMOL/L (ref 3.5–5.1)
PREALB SERPL-MCNC: 3 MG/DL (ref 20–43)
PROT SERPL-MCNC: 5.7 G/DL (ref 6–8.4)
PROTHROMBIN TIME: 15.6 SEC (ref 9–12.5)
PULM VEIN S/D RATIO: 1.73
PV PEAK D VEL: 0.44 M/S
PV PEAK S VEL: 0.76 M/S
RA MAJOR: 3.92 CM
RA PRESSURE: 3 MMHG
RA WIDTH: 3.17 CM
RBC # BLD AUTO: 2.82 M/UL (ref 4.6–6.2)
RIGHT VENTRICULAR END-DIASTOLIC DIMENSION: 4.25 CM
RV TISSUE DOPPLER FREE WALL SYSTOLIC VELOCITY 1 (APICAL 4 CHAMBER VIEW): 20.05 CM/S
SINUS: 3 CM
SMOOTH MUSCLE AB TITR SER IF: NORMAL {TITER}
SODIUM SERPL-SCNC: 132 MMOL/L (ref 136–145)
STJ: 2.93 CM
TDI LATERAL: 0.13 M/S
TDI SEPTAL: 0.08 M/S
TDI: 0.11 M/S
TR MAX PG: 25 MMHG
TRICUSPID ANNULAR PLANE SYSTOLIC EXCURSION: 2.36 CM
TV REST PULMONARY ARTERY PRESSURE: 28 MMHG
WBC # BLD AUTO: 7.73 K/UL (ref 3.9–12.7)

## 2020-08-31 PROCEDURE — 83010 ASSAY OF HAPTOGLOBIN QUANT: CPT

## 2020-08-31 PROCEDURE — 85730 THROMBOPLASTIN TIME PARTIAL: CPT

## 2020-08-31 PROCEDURE — 99233 SBSQ HOSP IP/OBS HIGH 50: CPT | Mod: ,,, | Performed by: HOSPITALIST

## 2020-08-31 PROCEDURE — 85384 FIBRINOGEN ACTIVITY: CPT

## 2020-08-31 PROCEDURE — 20600001 HC STEP DOWN PRIVATE ROOM

## 2020-08-31 PROCEDURE — 11000001 HC ACUTE MED/SURG PRIVATE ROOM

## 2020-08-31 PROCEDURE — 63600175 PHARM REV CODE 636 W HCPCS: Performed by: HOSPITALIST

## 2020-08-31 PROCEDURE — 99233 PR SUBSEQUENT HOSPITAL CARE,LEVL III: ICD-10-PCS | Mod: ,,, | Performed by: HOSPITALIST

## 2020-08-31 PROCEDURE — 25000003 PHARM REV CODE 250: Performed by: STUDENT IN AN ORGANIZED HEALTH CARE EDUCATION/TRAINING PROGRAM

## 2020-08-31 PROCEDURE — 25000003 PHARM REV CODE 250: Performed by: HOSPITALIST

## 2020-08-31 PROCEDURE — 80053 COMPREHEN METABOLIC PANEL: CPT

## 2020-08-31 PROCEDURE — 85025 COMPLETE CBC W/AUTO DIFF WBC: CPT

## 2020-08-31 PROCEDURE — 36415 COLL VENOUS BLD VENIPUNCTURE: CPT

## 2020-08-31 PROCEDURE — 83615 LACTATE (LD) (LDH) ENZYME: CPT

## 2020-08-31 PROCEDURE — 84134 ASSAY OF PREALBUMIN: CPT

## 2020-08-31 PROCEDURE — 63600175 PHARM REV CODE 636 W HCPCS: Performed by: STUDENT IN AN ORGANIZED HEALTH CARE EDUCATION/TRAINING PROGRAM

## 2020-08-31 PROCEDURE — 85610 PROTHROMBIN TIME: CPT

## 2020-08-31 PROCEDURE — 92610 EVALUATE SWALLOWING FUNCTION: CPT

## 2020-08-31 RX ORDER — CETIRIZINE HYDROCHLORIDE 10 MG/1
10 TABLET ORAL DAILY
COMMUNITY

## 2020-08-31 RX ORDER — THERA TABS 400 MCG
1 TAB ORAL DAILY
COMMUNITY

## 2020-08-31 RX ORDER — GABAPENTIN 600 MG/1
600 TABLET ORAL 2 TIMES DAILY
COMMUNITY

## 2020-08-31 RX ORDER — LOPERAMIDE HYDROCHLORIDE 2 MG/1
2 CAPSULE ORAL 4 TIMES DAILY PRN
COMMUNITY

## 2020-08-31 RX ORDER — HYDROCODONE BITARTRATE AND ACETAMINOPHEN 7.5; 325 MG/1; MG/1
1 TABLET ORAL EVERY 6 HOURS PRN
COMMUNITY

## 2020-08-31 RX ORDER — ENOXAPARIN SODIUM 100 MG/ML
40 INJECTION SUBCUTANEOUS EVERY 24 HOURS
Status: DISCONTINUED | OUTPATIENT
Start: 2020-08-31 | End: 2020-09-02 | Stop reason: HOSPADM

## 2020-08-31 RX ORDER — FOLIC ACID 1 MG/1
1 TABLET ORAL DAILY
COMMUNITY

## 2020-08-31 RX ORDER — MEGESTROL ACETATE 40 MG/ML
800 SUSPENSION ORAL DAILY
Status: ON HOLD | COMMUNITY
End: 2020-09-02 | Stop reason: HOSPADM

## 2020-08-31 RX ORDER — LANOLIN ALCOHOL/MO/W.PET/CERES
100 CREAM (GRAM) TOPICAL DAILY
COMMUNITY

## 2020-08-31 RX ORDER — CHLORPROMAZINE HYDROCHLORIDE 25 MG/1
25 TABLET, FILM COATED ORAL ONCE
Status: COMPLETED | OUTPATIENT
Start: 2020-08-31 | End: 2020-08-31

## 2020-08-31 RX ORDER — MIRTAZAPINE 15 MG/1
15 TABLET, FILM COATED ORAL NIGHTLY
COMMUNITY

## 2020-08-31 RX ORDER — SPIRONOLACTONE 25 MG/1
25 TABLET ORAL DAILY
Status: ON HOLD | COMMUNITY
End: 2020-09-02 | Stop reason: HOSPADM

## 2020-08-31 RX ORDER — NAPROXEN 250 MG/1
250 TABLET ORAL 2 TIMES DAILY
Status: ON HOLD | COMMUNITY
End: 2020-09-02 | Stop reason: HOSPADM

## 2020-08-31 RX ORDER — FUROSEMIDE 20 MG/1
20 TABLET ORAL DAILY
Status: ON HOLD | COMMUNITY
End: 2020-09-02 | Stop reason: HOSPADM

## 2020-08-31 RX ORDER — POTASSIUM CHLORIDE 20 MEQ/1
40 TABLET, EXTENDED RELEASE ORAL ONCE
Status: COMPLETED | OUTPATIENT
Start: 2020-08-31 | End: 2020-08-31

## 2020-08-31 RX ADMIN — PHYTONADIONE 10 MG: 10 INJECTION, EMULSION INTRAMUSCULAR; INTRAVENOUS; SUBCUTANEOUS at 10:08

## 2020-08-31 RX ADMIN — ENOXAPARIN SODIUM 40 MG: 40 INJECTION SUBCUTANEOUS at 04:08

## 2020-08-31 RX ADMIN — CHLORPROMAZINE HYDROCHLORIDE 25 MG: 25 TABLET, FILM COATED ORAL at 02:08

## 2020-08-31 RX ADMIN — FOLIC ACID 1 MG: 1 TABLET ORAL at 10:08

## 2020-08-31 RX ADMIN — RIFAXIMIN 550 MG: 550 TABLET ORAL at 10:08

## 2020-08-31 RX ADMIN — LACTULOSE 20 G: 20 SOLUTION ORAL at 10:08

## 2020-08-31 RX ADMIN — Medication 100 MG: at 10:08

## 2020-08-31 RX ADMIN — ZINC SULFATE 220 MG (50 MG) CAPSULE 220 MG: CAPSULE at 10:08

## 2020-08-31 RX ADMIN — POTASSIUM CHLORIDE 40 MEQ: 1500 TABLET, EXTENDED RELEASE ORAL at 10:08

## 2020-08-31 NOTE — TREATMENT PLAN
Hepatology Treatment Plan    Nii Davidson is a 59 y.o. male admitted to hospital 8/28/2020 (Hospital Day: 4) due to Decompensated cirrhosis related to hepatitis C virus (HCV).     Interval History  MRI triple phase with multifocal HCC.  Still encephalopathic  LFTs improved slightly. Bili stable 2.8. INR down to 1.4 with IV Vit K.    Objective  Temp:  [96.5 °F (35.8 °C)-98.9 °F (37.2 °C)] 98 °F (36.7 °C) (08/31 1110)  Pulse:  [75-83] 81 (08/31 1110)  BP: ()/(50-72) 120/70 (08/31 1110)  Resp:  [17-20] 20 (08/31 1110)  SpO2:  [94 %-96 %] 94 % (08/31 1110)      Laboratory    MELD-Na score: 18 at 8/31/2020  4:07 AM  MELD score: 14 at 8/31/2020  4:07 AM  Calculated from:  Serum Creatinine: 0.7 mg/dL (Rounded to 1 mg/dL) at 8/31/2020  4:07 AM  Serum Sodium: 132 mmol/L at 8/31/2020  4:07 AM  Total Bilirubin: 2.8 mg/dL at 8/31/2020  4:07 AM  INR(ratio): 1.4 at 8/31/2020  4:07 AM  Age: 59 years 11 months    Recent Labs   Lab 08/29/20  0358 08/30/20  0411 08/31/20  0407   HGB 10.3* 11.2* 10.5*       Lab Results   Component Value Date    WBC 7.73 08/31/2020    HGB 10.5 (L) 08/31/2020    HCT 30.3 (L) 08/31/2020     (H) 08/31/2020    PLT 80 (L) 08/31/2020       Lab Results   Component Value Date     (L) 08/31/2020    K 3.1 (L) 08/31/2020     08/31/2020    CO2 21 (L) 08/31/2020    BUN 15 08/31/2020    CREATININE 0.7 08/31/2020    CALCIUM 7.2 (L) 08/31/2020    ANIONGAP 5 (L) 08/31/2020    ESTGFRAFRICA >60.0 08/31/2020    EGFRNONAA >60.0 08/31/2020       Lab Results   Component Value Date    ALT 40 08/31/2020    AST 95 (H) 08/31/2020    ALKPHOS 68 08/31/2020    BILITOT 2.8 (H) 08/31/2020       Lab Results   Component Value Date    INR 1.4 (H) 08/31/2020    INR 1.7 (H) 08/30/2020    INR 2.2 (H) 08/29/2020       Problem List:  1. 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   2. Cirrhosis decompensated with HE and ascites and EV  3. HCV, possible Etoh(PETH pending), polysubstance  abuse(presumed opioids, positive of Utox)  4. KRISTA likely prerenal given Cindy <20, resolved with albumin.   5. Hyponatremia, resolved  6. Coagulopathy, improving  7. Leukocytosis, improved  8. HBVsAB positive, HBVcAB total positive, meaning immune to Hep B but had infection in past.      New diagnosis of multifocal HCC with masses measuring 5.9cm, 3.7cm and two addition up to 1.1cm with L portal vein thrombus. MRCP with no biliary dilatation but MRI triple phase with mild intrahepatic dilatation.     Plan:  - Multifocal HCC: discuss at IR conference tomorrow  -KRISTA improved, likely prerenal improved with albumin: albumin 1g per body weight daily in divided doses, for 1 more day, hold diuretics,strict I/O, daily weights.   - Ascites: mild  paracentesis to r/o SBP; 2 gram Na restrict; hold diuretics in setting of KRISTA  - Esophageal varices: last EGD 2014, grade 2 EV, banded, needs repeat EGD for variceal surveillance,   -PVT: will need to determine if he can be compliant with anticoagulation.   - Hepatic encephalopathy: present  lactulose titrated to 2-3 BMs daily, add rifaximin 550 twice daily  - Coagulopathy: Vitamin K 10 IV X 3 days. Improved  - Vaccination status: check HAV total Ab, and HbsAb if neg, will need HAV/HBV vaccine outpatient. But appears patient is immune to Hep B  - Transplant: Not a candidate given HCC (does not meet Justin). Echo with bubbles, addiction psych to evaluate. Will order serologies to r/o other causes of cirrhosis.    Thank you for involving us in the care of Nii Davidson. Please call with any additional questions, concerns or changes in the patient's clinical status.    Derick Sage MD  Gastroenterology Fellow, PGY V

## 2020-08-31 NOTE — PROGRESS NOTES
Progress Note   Hospital Medicine         Patient Name: Nii Davidson  MRN:  [id number]  [place] [facility] Medicine Team: [facility] L Stas Holloway MD  Date of Admission:  8/28/2020     Length of Stay:  LOS: 3 days   Expected Discharge Date: 9/3/2020  Principal Problem:  Decompensated cirrhosis related to hepatitis C virus (HCV)       Subjective:     Interval History/Overnight Events:  Patient is more alert today, and answering some questions, however still very confused, patient's MRI shows multifocal HCC and metastasis, getting CT chest;   - continue high dose lactulose to have 4 to 5 BMs daily, had 5 yesterday;   - patient to be discussed at IR conference tomorrow  - apparently patient was suppose to be treated for this at [facility] this week  - unsure who patient lives with; states last drink was prior to coming into the hospital ;   - will get palliative care on board as overall patient's prognosis is poor     Review of Systems     UNABLE TO OBTAIN DUE TO PATIENT'S CONDITION     Objective:     Temp:  [97 °F (36.1 °C)-98.9 °F (37.2 °C)]   Pulse:  [77-84]   Resp:  [17-20]   BP: (100-120)/(50-72)   SpO2:  [93 %-96 %]       Physical Exam:  Constitutional: appears weak and ill  Head: Normocephalic and atraumatic.   Mouth/Throat: Oropharynx is clear and moist.   Eyes: EOM are normal. Pupils are equal, round, and reactive to light. positive scleral icterus.   Neck: Normal range of motion. Neck supple.   Cardiovascular: Normal rate and regular rhythm.  No murmur heard.  Pulmonary/Chest: Effort normal and breath sounds normal. No respiratory distress. No wheezes, rales, or rhonchi  Abdominal: Soft. Bowel sounds are normal.  No distension or tenderness  Musculoskeletal: Normal range of motion. No edema.   Neurological: alert only too self    Skin: Skin is warm and dry.   Psychiatric: Normal mood and affect. Behavior is normal.     Recent Labs   Lab 08/28/20  1552 08/29/20  0358 08/30/20  0411 08/31/20  0407   WBC 14.30* 14.49*  10.05 7.73   HGB 11.7* 10.3* 11.2* 10.5*   HCT 33.6* 30.2* 32.8* 30.3*   PLT 86* 71* 67* 80*     Recent Labs   Lab 08/29/20  0358 08/30/20  0411 08/31/20  0407   * 134* 132*   K 3.5 3.5 3.1*    106 106   CO2 19* 24 21*   BUN 27* 21* 15   CREATININE 1.6* 0.9 0.7   GLU 80 93 89   CALCIUM 7.3* 7.5* 7.2*     Recent Labs   Lab 08/29/20  0358 08/30/20  0411 08/31/20  0407   ALKPHOS 62 60 68   ALT 39 43 40   * 112* 95*   ALBUMIN 1.9* 1.8* 1.7*   PROT 5.8* 5.9* 5.7*   BILITOT 2.4* 2.9* 2.8*   INR 2.2* 1.7* 1.4*     Recent Labs   Lab 08/28/20  1559   POCTGLUCOSE 71        enoxaparin  40 mg Subcutaneous Q24H    folic acid  1 mg Oral Daily    lactulose  30 g Oral TID    rifAXImin  550 mg Oral BID    thiamine  100 mg Oral Daily    zinc sulfate  220 mg Oral Daily       Assessment and Plan     Mr. Nii Davidson is a 59 y.o. male who presented to [facility] on 8/28/2020 with     Hospital Course:    Mr. Nii Davidson was admitted to Hospital Medicine for management of     Active Hospital Problems    Diagnosis  POA    *Decompensated cirrhosis related to hepatitis C virus (HCV) [B19.20, K74.69]  Yes    Hepatocellular carcinoma metastatic to lung [C78.00, C22.0]  Yes    Liver mass [R16.0]  Yes    Alcoholic cirrhosis of liver without ascites [K70.30]  Yes    Coagulopathy [D68.9]  Yes    Thrombocytopenia [D69.6]  Yes    Hepatic encephalopathy [K72.90]  Yes    Portal vein thrombosis [I81]  Yes    KRISTA (acute kidney injury) [N17.9]  Yes    Hyponatremia [E87.1]  Yes    Macrocytic anemia [D53.9]  Yes      Resolved Hospital Problems   No resolved problems to display.     # Acute hepatic encephalopathy  - lactulose enema times 1 now  - increase PO lactulose and cont rifaximin and add zinc  - infectious work up has been negative    # Multifocal HCC with metastasis   # Portal Vein Thrombosis   - 4.1 cm, elevated AFP  - MRI ab reviewed  - CT chest to evaluate for metastasis   -  to be discussed at IR  conference on Tuesday    # Decompensated Hep C and alcohol cirrhosis without ascites  MELD-Na score: 18 at 8/31/2020  4:07 AM  MELD score: 14 at 8/31/2020  4:07 AM  Calculated from:  Serum Creatinine: 0.7 mg/dL (Rounded to 1 mg/dL) at 8/31/2020  4:07 AM  Serum Sodium: 132 mmol/L at 8/31/2020  4:07 AM  Total Bilirubin: 2.8 mg/dL at 8/31/2020  4:07 AM  INR(ratio): 1.4 at 8/31/2020  4:07 AM  Age: 59 years 11 months  - hepatology consulted  - PETH pending  - U/S liver reviewed    - not currently a liver transplant candidate  - thiamine and folic acid     # KRISTA  - likely pre-renal   - resolved     # Coagulopathy due to liver disease  - vitamin k for 3 days  - DIC labs    # Hyponatremia  - fluid restriction     # Macrocytic anemia  # Thrombocytopenia  - cont folic acid  - iron studies and folate b12 reviewed   - DIC labs      Diet:  Low sodium  GI PPx:    DVT PPx:    Goals of Care:  full    High Risk Conditions:  HE    Disposition:  Later next week    Stas Holloway MD  Medical Director [facility] Medicine  Spectra:  [phone]  Pager: [phone]

## 2020-08-31 NOTE — CONSULTS
"  [facility]  Adult Nutrition  Consult Note    SUMMARY     Recommendations  1. ADAT texture per SLP, low sodium with fluid restriction per MD.   2. Add Boost Plus TID to aid in intake.   3. RD to monitor.    Goals: Adequate PO intake to meet > 75% EEN/EPN by RD follow up  Nutrition Goal Status: new  Communication of RD Recs: other (comment)(POC)    Reason for Assessment    Reason For Assessment: consult  Diagnosis: (Decompensated cirrhosis related to HCV)  Relevant Medical History: HCV cirrhosis, opiate abuse, HTN  Interdisciplinary Rounds: did not attend  General Information Comments: Pt lethargic and disoriented, unable to aid in nutrition hx. Variable PO 25-75% of meals this admission per RN documentation. Stable wt # x 1 year per chart, noting recent 21# (13.8%) wt loss since admission - unclear if true wt loss or scale error, will monitor. Unable to complete NFPE 2/2 pt asleep on side, RD to re-attempt on follow up. Pt is at risk for malnutrition due to variable intake, however unable to fully assess at this time.   Nutrition Discharge Planning: Adequate PO intake on low Na diet    Nutrition Risk Screen    Nutrition Risk Screen: no indicators present    Nutrition/Diet History         Anthropometrics    Temp: 98 °F (36.7 °C)  Height Method: Stated  Height: 5' 11" (180.3 cm)  Height (inches): 71 in  Weight Method: Bed Scale  Weight: 68.8 kg (151 lb 10.8 oz)  Weight (lb): 151.68 lb  Ideal Body Weight (IBW), Male: 172 lb  % Ideal Body Weight, Male (lb): 100 %  BMI (Calculated): 21.2    Lab/Procedures/Meds    Pertinent Labs Reviewed: reviewed  Pertinent Labs Comments: Na 132, K 3.1, Ca 7.2, Alb 1.7, prealb 9, AST 95  Pertinent Medications Reviewed: reviewed  Pertinent Medications Comments: folic acid, lactulose, thiamine, zinc    Estimated/Assessed Needs    Weight Used For Calorie Calculations: 68.8 kg (151 lb 10.8 oz)  Energy Calorie Requirements (kcal): 1906 kcal/day(x 1.25 PAL)  Energy " Need Method: [place]- Rolf  Protein Requirements: 69-83 g/day(1-1.2 g/kg)  Weight Used For Protein Calculations: 68.8 kg (151 lb 10.8 oz)  Fluid Requirements (mL): per MD or 1 mL/kcal     RDA Method (mL): 1906    Nutrition Prescription Ordered    Current Diet Order: Pureed  Nutrition Order Comments: 1500mL FR    Evaluation of Received Nutrient/Fluid Intake    Comments: LBM 8/31  % Intake of Estimated Energy Needs: 25-75%  % Meal Intake: 25-75%    Nutrition Risk    Level of Risk/Frequency of Follow-up: (2x/week)     Assessment and Plan    Nutrition Problem  Inadequate energy intake    Related to (etiology):   Decreased ability to consume sufficient energy    Signs and Symptoms (as evidenced by):   AMS, variable intake 25-75% of meals    Interventions (treatment strategy):  Collaboration with other providers  Commercial beverage    Nutrition Diagnosis Status:   New    Monitor and Evaluation    Food and Nutrient Intake: energy intake, food and beverage intake  Food and Nutrient Adminstration: diet order  Anthropometric Measurements: weight, weight change, body mass index  Biochemical Data, Medical Tests and Procedures: electrolyte and renal panel, gastrointestinal profile, glucose/endocrine profile, inflammatory profile, lipid profile  Nutrition-Focused Physical Findings: overall appearance    Nutrition Follow-Up    RD Follow-up?: Yes

## 2020-08-31 NOTE — PLAN OF CARE
No acute events throughout night. Pt  unable to express needs. Meds given as ordered .  Patient had 4 brown watery stools during the night.  Agitated.  Safety maintained.  Bed in low position,  call  light in reach.    Will continue to monitor

## 2020-08-31 NOTE — PROGRESS NOTES
Progress Note   Hospital Medicine         Patient Name: Nii Davidson  MRN:  [id number]  [facility] Medicine Team: [facility] Stas Holloway MD  Date of Admission:  8/28/2020     Length of Stay:  LOS: 2 days   Expected Discharge Date: 8/31/2020  Principal Problem:  Decompensated cirrhosis related to hepatitis C virus (HCV)       Subjective:     Interval History/Overnight Events:  Patient transferred from another medicine service, on exam, patient is very encephalopathic and confused; alert only to himself, will give STAT lactulose enema and increase PO lactulose, cont rifaximin  - patient found to have a 4.1 cm liver mass, getting MRI ab with contrast today for further evaluation, elevated AFP, seems to have tumor invasion into the portal vein; will also get a CT chest to evaluation for metastasis     Review of Systems     UNABLE TO OBTAIN DUE TO PATIENT'S CONDITION     Objective:     Temp:  [96.3 °F (35.7 °C)-99.3 °F (37.4 °C)]   Pulse:  [75-83]   Resp:  [16-19]   BP: ()/(53-72)   SpO2:  [90 %-96 %]       Physical Exam:  Constitutional: appears weak and ill  Head: Normocephalic and atraumatic.   Mouth/Throat: Oropharynx is clear and moist.   Eyes: EOM are normal. Pupils are equal, round, and reactive to light. positive scleral icterus.   Neck: Normal range of motion. Neck supple.   Cardiovascular: Normal rate and regular rhythm.  No murmur heard.  Pulmonary/Chest: Effort normal and breath sounds normal. No respiratory distress. No wheezes, rales, or rhonchi  Abdominal: Soft. Bowel sounds are normal.  No distension or tenderness  Musculoskeletal: Normal range of motion. No edema.   Neurological: alert only too self    Skin: Skin is warm and dry.   Psychiatric: Normal mood and affect. Behavior is normal.     Recent Labs   Lab 08/28/20  1552 08/29/20  0358 08/30/20  0411   WBC 14.30* 14.49* 10.05   HGB 11.7* 10.3* 11.2*   HCT 33.6* 30.2* 32.8*   PLT 86* 71* 67*     Recent Labs   Lab 08/28/20  1552  08/29/20  0358 08/30/20  0411   * 127* 134*   K 4.1 3.5 3.5    101 106   CO2 19* 19* 24   BUN 24* 27* 21*   CREATININE 2.3* 1.6* 0.9   GLU 66* 80 93   CALCIUM 8.1* 7.3* 7.5*     Recent Labs   Lab 08/28/20  1552 08/29/20  0358 08/30/20  0411   ALKPHOS 63 62 60   ALT 47* 39 43   * 109* 112*   ALBUMIN 2.4* 1.9* 1.8*   PROT 7.2 5.8* 5.9*   BILITOT 3.4* 2.4* 2.9*   INR 1.9* 2.2* 1.7*     Recent Labs   Lab 08/28/20  1559   POCTGLUCOSE 71        [START ON 8/31/2020] folic acid  1 mg Oral Daily    lactulose  30 g Oral TID    phytonadione ((AQUA-MEPHYTON) IVPB  10 mg Intravenous Daily    rifAXImin  550 mg Oral BID    [START ON 8/31/2020] thiamine  100 mg Oral Daily       Assessment and Plan     Mr. Nii Davidson is a 59 y.o. male who presented to [facility] on 8/28/2020 with     Hospital Course:    Mr. Nii Davidson was admitted to Hospital Medicine for management of     Active Hospital Problems    Diagnosis  POA    *Decompensated cirrhosis related to hepatitis C virus (HCV) [B19.20, K74.69]  Yes    Liver mass [R16.0]  Yes    Alcoholic cirrhosis of liver without ascites [K70.30]  Yes    Coagulopathy [D68.9]  Yes    Thrombocytopenia [D69.6]  Yes    Hepatic encephalopathy [K72.90]  Yes    Portal vein thrombosis [I81]  Yes    KRISTA (acute kidney injury) [N17.9]  Yes    Hyponatremia [E87.1]  Yes    Macrocytic anemia [D53.9]  Yes      Resolved Hospital Problems   No resolved problems to display.     # Acute hepatic encephalopathy  - lactulose enema times 1 now  - increase PO lactulose and cont rifaximin and add zinc  - infectious work up has been negative    # Liver mass  # Portal Vein Thrombosis   - 4.1 cm, elevated AFP  - getting MRI abdomen with contrast for further evaluation  - CT chest to evaluate for metastasis   - will likely need to be discussed at IR conference on Tuesday    # Decompensated Hep C and alcohol cirrhosis without ascites  MELD-Na score: 18 at 8/30/2020  4:11 AM  MELD  score: 16 at 8/30/2020  4:11 AM  Calculated from:  Serum Creatinine: 0.9 mg/dL (Rounded to 1 mg/dL) at 8/30/2020  4:11 AM  Serum Sodium: 134 mmol/L at 8/30/2020  4:11 AM  Total Bilirubin: 2.9 mg/dL at 8/30/2020  4:11 AM  INR(ratio): 1.7 at 8/30/2020  4:11 AM  Age: 59 years 11 months  - hepatology consulted  - PETH pending  - U/S liver reviewed   - getting MRI abdomen   - not currently a liver transplant candidate  - thiamine and folic acid     # KRISTA  - likely pre-renal   - resolved     # Coagulopathy due to liver disease  - vitamin k for 3 days  - DIC labs    # Hyponatremia  - fluid restriction     # Macrocytic anemia  # Thrombocytopenia  - cont folic acid  - iron studies and folate b12 reviewed   - DIC labs      Diet:  Low sodium  GI PPx:    DVT PPx:    Goals of Care:  full    High Risk Conditions:  HE    Disposition:  Later next week    Stas Holloway MD  Medical Director [facility] Medicine  Spectra:  [phone]  Pager: [phone]

## 2020-08-31 NOTE — PLAN OF CARE
3819  Attempt to complete discharge planning assessment with patient unsuccessful. Patient repeatedly falling asleep or stated that he is too confused to answer questions.     CHERISE was informed by the patient's sister-in-law, Rita Bobo ([phone]), that the patient is living at [facility] room [address] ([phone]), that the room is being paid for by the state, & that the patient has a SW, Ally Esparza ([phone], ext [phone]), w/[facility].     CM informed Ally w/[facility] of the patient's hospitalization. Ally stated that the patient is establish with [employer] & that they are signing a lease today to obtain permanent housing for the patient. Ally stated that she will give Ethan & Rosalina with [facility] this CM's contact information.     Ally also stated the patient was supposed to start treatment for his liver cancer at [facility] today. Ally was unable to provide this CM with the name of the patient's hepatologist at [facility]. Ally stated that the patient's PCP is Dr. Susu Cowan at [facility].     CHERISE informed Dr. Holloway of above. MD stated that the patient will be discussed at IR conference tomorrow.     1400  CM was informed by Ally that the patient is being followed by Dr. Justin Parada (hem/onc) at [facility]. CHERISE was informed by Dr. Parada's (p [phone], f [phone]) office that the patient does not have a date to start treatment but does have a previously scheduled appointment with Dr. Parada on 9/11/2020 at 1520.    CHERISE was also informed by Ally that per Susan Anderson w/[facility] the patient will discharge to [address] [facility], [address]. CM to coordinate with Ally to obtain keys.     CM informed the patient of above.     Will continue to follow.

## 2020-08-31 NOTE — PT/OT/SLP EVAL
"Speech Language Pathology Evaluation  Bedside Swallow/ Discharge Summary    Patient Name:  Nii Davidson   MRN:  [id number]   1124/1124 A    Admitting Diagnosis: Decompensated cirrhosis related to hepatitis C virus (HCV)    Recommendations:                 General Recommendations:  Follow-up not indicated  Diet recommendations:  Regular, Thin   Aspiration Precautions: Standard aspiration precautions   General Precautions: Standard, fall  Communication strategies:  go to room if call light pushed    History:     Past Medical History:   Diagnosis Date    Cirrhosis     Encounter for blood transfusion     Hepatitis C     Hypertension        Past Surgical History:   Procedure Laterality Date    EXPLORATORY LAPAROTOMY  1989     Prior diet: Per pt, regular consistency solid diet and thin liquids prior to admit. Per review of pt's medical chart, pureed solid diet and thin liquids initiated by medical team prior to this evaluation.     Subjective     "Sittin up." Via teachback, pt demonstrated good understanding of need for fully upright position during PO intake to reduce risk for aspiration.      Pain/Comfort:  · Pain Rating 1: 0/10  · Pain Rating Post-Intervention 1: 0/10    Objective:     Oral Musculature Evaluation  · Oral Musculature: WFL  · Dentition: present and adequate  · Secretion Management: adequate  · Mucosal Quality: adequate  · Mandibular Strength and Mobility: WFL  · Oral Labial Strength and Mobility: WFL  · Lingual Strength and Mobility: WFL  · Velar Elevation: WFL  · Buccal Strength and Mobility: WFL  · Volitional Cough: WFL  · Volitional Swallow: WFL  · Voice Prior to PO Intake: Clear, dry    Bedside Swallow Eval:   Consistencies Assessed:  · Thin coffee- cup sip x4-5 taken in isolation and as regular consistency solid liquid wash  · Thin water- continuous straw sip x1 as regular consistency solid liquid wash  · Regular consistency solid coty cracker- ~3/4 cracker via bite x3    Oral Phase: "   · Although slightly slow mastication of regular consistency solid, remained functional    Pharyngeal Phase:   · Overt clinical signs aspiration unappreciated     Treatment:   Pt awake and alert upon entry, eating pureed breakfast while propped up on his left elbow in side lying position. Pt readily followed verbal commands for supine position then HOB raised to fully upright position. Extensive education provided re: need for fully upright position during PO intake to reduce risk for aspiration and role of SLP. Additional education provided re: additional aspiration precautions listed above and SLP POC. Encouragement provided to request SLP re-consult. Via teachback, pt demonstrated good understanding of all education provided, as well as he verbalized agreement with SLP POC. White board updated. No further questions.      Assessment:     Nii Davidson is a 59 y.o. male without additional acute SLP needs at this time. Please re-consult should changes occur.     Goals:   Multidisciplinary Problems     SLP Goals     Not on file          Multidisciplinary Problems (Resolved)        Problem: SLP Goal    Goal Priority Disciplines Outcome   SLP Goal   (Resolved)     SLP Met                   Plan:     · Plan of Care reviewed with:  patient   · SLP Follow-Up:  No       Discharge recommendations:  home     Time Tracking:     SLP Treatment Date:   08/31/20  Speech Start Time:  0855  Speech Stop Time:  0904     Speech Total Time (min):  9 min    Billable Minutes: Eval Swallow and Oral Function 9    МАРИНА Cagle, CCC-SLP  [phone]  8/31/2020

## 2020-08-31 NOTE — TELEPHONE ENCOUNTER
.  Patient: Nii Davidson       MRN: [id number]      : 1960     Age: 59 y.o.  [address] [facility]  [place] [address]    Provider: Hepatologist Dorina Sosa    Urgency of review: urgent    Patient Transplant Status: Other inpatient     Reason for presentation: Other Assessment of imaging    Clinical Summary: a 59 y.o. male with history of HCV, opiate abuse, HTN presented with altered mental status. Patient is a poor historian,, and was brought to the ED by EMS who stated patient was somnolent and confused.HCV quant showed [id number], genotype 1A hbvcAB total was positive at the time along with surface antibody. EGD showed grade 2 EV s/p banding, PHG, recommended repeat EGD in 4 weeks. Colonoscopy showed normal TI and colon, inadequate prep, recommended repeat in 5 years.    Imaging to be reviewed: CT of abd, .  U/S and MRCP AND  MRI    HCC Treatment History: none    ABO:     Platelets:   Lab Results   Component Value Date/Time    PLT 80 (L) 2020 04:07 AM     Creatinine:   Lab Results   Component Value Date/Time    CREATININE 0.7 2020 04:07 AM     Bilirubin:   Lab Results   Component Value Date/Time    BILITOT 2.8 (H) 2020 04:07 AM     AFP Last 3 each if available:   Lab Results   Component Value Date/Time     (H) 2020 11:49 PM    AFP 63 (H) 2020 03:35 PM       MELD: MELD-Na score: 18 at 2020  4:07 AM  MELD score: 14 at 2020  4:07 AM  Calculated from:  Serum Creatinine: 0.7 mg/dL (Rounded to 1 mg/dL) at 2020  4:07 AM  Serum Sodium: 132 mmol/L at 2020  4:07 AM  Total Bilirubin: 2.8 mg/dL at 2020  4:07 AM  INR(ratio): 1.4 at 2020  4:07 AM  Age: 59 years 11 months    Plan: : Multifocal HCC 5.9cm tumor in left hepatic lobe with probable associated segmental PV tumor thrombus.  3.7cm tumor in right hepatic lobe, seg VI.  Enlarged karen hepatis and cardiophrenic lymph nodes.  AFP is 104, which is low for suspected metastatic disease.  Bilirubin  is 2.8, which is borderline for Y90.  However, could consider Y90 followed by systemic therapy.       Confirmed HCC.  Bilobar infiltrated tumor.  seg 6 tumor 3.7cm .   Enlarged lymph nodes cardiophrenic and karen hepatis .  Recommend y90 if Sanju can remain within treatment range.  Oncology referral     Note forwarded to A lOivier RN   to coordinate care     Follow-up Provider: Dr Sosa

## 2020-08-31 NOTE — PLAN OF CARE
Recommendations  1. ADAT texture per SLP, low sodium with fluid restriction per MD.   2. Add Boost Plus TID to aid in intake.   3. RD to monitor.     Goals: Adequate PO intake to meet > 75% EEN/EPN by RD follow up  Nutrition Goal Status: new  Communication of RD Recs: other (comment)(POC)

## 2020-08-31 NOTE — NURSING
Patient identified by 2 identifiers.   Allergies reviewed.  22 g IV in place to Lt FA .  Bubble study explained to patient, patient verbalized understanding.  Bubble performed X 4, with & without Valsalva.  Pt tolerated procedure well.

## 2020-08-31 NOTE — PLAN OF CARE
Problem: Fall Injury Risk  Goal: Absence of Fall and Fall-Related Injury  Outcome: Ongoing, Progressing     Problem: Adult Inpatient Plan of Care  Goal: Plan of Care Review  Outcome: Ongoing, Progressing  Goal: Patient-Specific Goal (Individualization)  Outcome: Ongoing, Progressing  Goal: Absence of Hospital-Acquired Illness or Injury  Outcome: Ongoing, Progressing  Goal: Optimal Comfort and Wellbeing  Outcome: Ongoing, Progressing  Goal: Readiness for Transition of Care  Outcome: Ongoing, Progressing  Goal: Rounds/Family Conference  Outcome: Ongoing, Progressing     Problem: Skin Injury Risk Increased  Goal: Skin Health and Integrity  Outcome: Ongoing, Progressing    Pt quiet and withdrawn during shift. Refused to go to CT scan today despite education on importance. Pt stated that he may go tomorrow. Complaints of hiccups. Medication given as ordered and hiccups seem to have resolved at this time. Free of falls/trauma/injuries. Bed in low position, bed alarm set, wheels locked, Avasys in place, and call light within reach. Skin integrity remains unchanged; no new injuries at this time. VSS and afebrile. No acute distress noted/reported at this time. Will continue to monitor.

## 2020-08-31 NOTE — PROGRESS NOTES
Therapy with vancomycin complete and/or consult discontinued by provider. Pharmacy will sign off, please re-consult as needed.    Yuki Webster, PharmD, BCPS  [phone]

## 2020-08-31 NOTE — NURSING
Pt refusing to go to CT at this time. He stated he will go later and taht he does not feel like going at this time. Will call CT to notify when he is willing to go. Will continue to monitor.

## 2020-08-31 NOTE — PLAN OF CARE
Clinical evaluation of swallow complete. Recommend regular consistency solid diet and thin liquids. No additional acute SLP needs at this time. Please re-consult should changes occur.     МАРИНА Cagle, CCC-SLP  [phone]  8/31/2020

## 2020-09-01 ENCOUNTER — CONFERENCE (OUTPATIENT)
Dept: TRANSPLANT | Facility: CLINIC | Age: 60
End: 2020-09-01

## 2020-09-01 PROBLEM — F10.20 ALCOHOL USE DISORDER, SEVERE, DEPENDENCE: Chronic | Status: ACTIVE | Noted: 2019-03-25

## 2020-09-01 PROBLEM — Z51.5 PALLIATIVE CARE ENCOUNTER: Status: ACTIVE | Noted: 2020-09-01

## 2020-09-01 LAB
ALBUMIN SERPL BCP-MCNC: 1.7 G/DL (ref 3.5–5.2)
ALP SERPL-CCNC: 56 U/L (ref 55–135)
ALT SERPL W/O P-5'-P-CCNC: 35 U/L (ref 10–44)
ANION GAP SERPL CALC-SCNC: 5 MMOL/L (ref 8–16)
ANTI SM ANTIBODY: 0.13 RATIO (ref 0–0.99)
ANTI SM/RNP ANTIBODY: 0.17 RATIO (ref 0–0.99)
ANTI-SM INTERPRETATION: NEGATIVE
ANTI-SM/RNP INTERPRETATION: NEGATIVE
ANTI-SSA ANTIBODY: 0.09 RATIO (ref 0–0.99)
ANTI-SSA INTERPRETATION: NEGATIVE
ANTI-SSB ANTIBODY: 0.07 RATIO (ref 0–0.99)
ANTI-SSB INTERPRETATION: NEGATIVE
APTT BLDCRRT: 44.4 SEC (ref 21–32)
AST SERPL-CCNC: 81 U/L (ref 10–40)
BASOPHILS # BLD AUTO: 0.03 K/UL (ref 0–0.2)
BASOPHILS NFR BLD: 0.6 % (ref 0–1.9)
BILIRUB SERPL-MCNC: 2.7 MG/DL (ref 0.1–1)
BUN SERPL-MCNC: 14 MG/DL (ref 6–20)
CALCIUM SERPL-MCNC: 7.2 MG/DL (ref 8.7–10.5)
CHLORIDE SERPL-SCNC: 108 MMOL/L (ref 95–110)
CMV DNA SERPL NAA+PROBE-ACNC: NORMAL IU/ML
CO2 SERPL-SCNC: 22 MMOL/L (ref 23–29)
CREAT SERPL-MCNC: 0.7 MG/DL (ref 0.5–1.4)
DIFFERENTIAL METHOD: ABNORMAL
DSDNA AB SER-ACNC: NORMAL [IU]/ML
EOSINOPHIL # BLD AUTO: 0.1 K/UL (ref 0–0.5)
EOSINOPHIL NFR BLD: 2 % (ref 0–8)
ERYTHROCYTE [DISTWIDTH] IN BLOOD BY AUTOMATED COUNT: 14.9 % (ref 11.5–14.5)
EST. GFR  (AFRICAN AMERICAN): >60 ML/MIN/1.73 M^2
EST. GFR  (NON AFRICAN AMERICAN): >60 ML/MIN/1.73 M^2
GLUCOSE SERPL-MCNC: 96 MG/DL (ref 70–110)
HCT VFR BLD AUTO: 31.2 % (ref 40–54)
HGB BLD-MCNC: 10.5 G/DL (ref 14–18)
IMM GRANULOCYTES # BLD AUTO: 0.02 K/UL (ref 0–0.04)
IMM GRANULOCYTES NFR BLD AUTO: 0.4 % (ref 0–0.5)
INR PPP: 1.4 (ref 0.8–1.2)
LYMPHOCYTES # BLD AUTO: 0.9 K/UL (ref 1–4.8)
LYMPHOCYTES NFR BLD: 18.7 % (ref 18–48)
MCH RBC QN AUTO: 36.5 PG (ref 27–31)
MCHC RBC AUTO-ENTMCNC: 33.7 G/DL (ref 32–36)
MCV RBC AUTO: 108 FL (ref 82–98)
MONOCYTES # BLD AUTO: 1.4 K/UL (ref 0.3–1)
MONOCYTES NFR BLD: 27.4 % (ref 4–15)
NEUTROPHILS # BLD AUTO: 2.6 K/UL (ref 1.8–7.7)
NEUTROPHILS NFR BLD: 50.9 % (ref 38–73)
NRBC BLD-RTO: 0 /100 WBC
PLATELET # BLD AUTO: 71 K/UL (ref 150–350)
PMV BLD AUTO: 10.5 FL (ref 9.2–12.9)
POTASSIUM SERPL-SCNC: 3.1 MMOL/L (ref 3.5–5.1)
PROT SERPL-MCNC: 5.7 G/DL (ref 6–8.4)
PROTHROMBIN TIME: 15.1 SEC (ref 9–12.5)
RBC # BLD AUTO: 2.88 M/UL (ref 4.6–6.2)
SODIUM SERPL-SCNC: 135 MMOL/L (ref 136–145)
WBC # BLD AUTO: 5.03 K/UL (ref 3.9–12.7)

## 2020-09-01 PROCEDURE — 25000003 PHARM REV CODE 250: Performed by: HOSPITALIST

## 2020-09-01 PROCEDURE — 99233 SBSQ HOSP IP/OBS HIGH 50: CPT | Mod: AF,HB,S$PBB, | Performed by: PSYCHIATRY & NEUROLOGY

## 2020-09-01 PROCEDURE — 85610 PROTHROMBIN TIME: CPT

## 2020-09-01 PROCEDURE — 99233 SBSQ HOSP IP/OBS HIGH 50: CPT | Mod: ,,, | Performed by: HOSPITALIST

## 2020-09-01 PROCEDURE — 25000003 PHARM REV CODE 250: Performed by: STUDENT IN AN ORGANIZED HEALTH CARE EDUCATION/TRAINING PROGRAM

## 2020-09-01 PROCEDURE — 20600001 HC STEP DOWN PRIVATE ROOM

## 2020-09-01 PROCEDURE — 80053 COMPREHEN METABOLIC PANEL: CPT

## 2020-09-01 PROCEDURE — 97116 GAIT TRAINING THERAPY: CPT

## 2020-09-01 PROCEDURE — 99233 PR SUBSEQUENT HOSPITAL CARE,LEVL III: ICD-10-PCS | Mod: ,,, | Performed by: HOSPITALIST

## 2020-09-01 PROCEDURE — 63600175 PHARM REV CODE 636 W HCPCS: Performed by: HOSPITALIST

## 2020-09-01 PROCEDURE — 99233 PR SUBSEQUENT HOSPITAL CARE,LEVL III: ICD-10-PCS | Mod: AF,HB,S$PBB, | Performed by: PSYCHIATRY & NEUROLOGY

## 2020-09-01 PROCEDURE — A4216 STERILE WATER/SALINE, 10 ML: HCPCS | Performed by: STUDENT IN AN ORGANIZED HEALTH CARE EDUCATION/TRAINING PROGRAM

## 2020-09-01 PROCEDURE — 36415 COLL VENOUS BLD VENIPUNCTURE: CPT

## 2020-09-01 PROCEDURE — 85730 THROMBOPLASTIN TIME PARTIAL: CPT

## 2020-09-01 PROCEDURE — 97161 PT EVAL LOW COMPLEX 20 MIN: CPT

## 2020-09-01 PROCEDURE — 85025 COMPLETE CBC W/AUTO DIFF WBC: CPT

## 2020-09-01 RX ORDER — MIRTAZAPINE 15 MG/1
15 TABLET, FILM COATED ORAL NIGHTLY
Status: DISCONTINUED | OUTPATIENT
Start: 2020-09-01 | End: 2020-09-02 | Stop reason: HOSPADM

## 2020-09-01 RX ORDER — POTASSIUM CHLORIDE 20 MEQ/1
40 TABLET, EXTENDED RELEASE ORAL EVERY 4 HOURS
Status: COMPLETED | OUTPATIENT
Start: 2020-09-01 | End: 2020-09-01

## 2020-09-01 RX ADMIN — ENOXAPARIN SODIUM 40 MG: 40 INJECTION SUBCUTANEOUS at 05:09

## 2020-09-01 RX ADMIN — FOLIC ACID 1 MG: 1 TABLET ORAL at 08:09

## 2020-09-01 RX ADMIN — ZINC SULFATE 220 MG (50 MG) CAPSULE 220 MG: CAPSULE at 08:09

## 2020-09-01 RX ADMIN — POTASSIUM CHLORIDE 40 MEQ: 1500 TABLET, EXTENDED RELEASE ORAL at 12:09

## 2020-09-01 RX ADMIN — LACTULOSE 30 G: 20 SOLUTION ORAL at 08:09

## 2020-09-01 RX ADMIN — RIFAXIMIN 550 MG: 550 TABLET ORAL at 08:09

## 2020-09-01 RX ADMIN — POTASSIUM CHLORIDE 40 MEQ: 1500 TABLET, EXTENDED RELEASE ORAL at 03:09

## 2020-09-01 RX ADMIN — Medication 10 ML: at 09:09

## 2020-09-01 RX ADMIN — Medication 100 MG: at 08:09

## 2020-09-01 RX ADMIN — MIRTAZAPINE 15 MG: 15 TABLET, FILM COATED ORAL at 09:09

## 2020-09-01 RX ADMIN — RIFAXIMIN 550 MG: 550 TABLET ORAL at 09:09

## 2020-09-01 NOTE — PLAN OF CARE
POC reviewed with pt. Reinforcement needed. AAOx3, disoriented to situation. VSS. Telesitter in place. No falls/injuries reported. Safety checks performed. Call light and bedside table placed within reach. WCTM.
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
## 2020-09-01 NOTE — PHYSICIAN QUERY
"PT Name: Nii Davidson  MR #: [id number]     Etiology of Condition Clarification      CDS/: Nadir Smith RN              Contact information:    [email or web address]    This form is a permanent document in the medical record.     Query Date: September 1, 2020    By submitting this query, we are merely seeking further clarification of documentation.  Please utilize your independent clinical judgment when addressing the question(s) below.     The Medical Record contains the following:    Clinical Information Location in Medical Record   Patient also describes having a past medical history of "liver cancer" although review of chart is not clear. .....    Hepatic encephalopathy: ....history of hepatitis C cirrhosis, opiate abuse, and hypertension who presented with altered mental status.....broad differential for altered mental status but most likely related to hepatic encephalopathy at this time considering known liver disease with elevated ammonia 155 and worsening PT/INR from July labs...    Problem List:  Cirrhosis decompensated with HE and ascites and EV  HCV, possible Etoh(PETH pending), polysubstance          abuse(presumed opioids, positive of Utox)  Possible HCC 4.1 cm , with elevated AFP(104)  PVT  KRISTA likely prerenal given Cindy <20  hyponatremia  HBVsAB positive, HBVcAB total positive, meaning immune to Hep B but had infection in past.    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     Hepatic encephalopathy: present  lactulose titrated to 2-3 BMs daily, add rifaximin 550 twice daily    Hepatocellular carcinoma metastatic to lung ................................    Acute hepatic encephalopathy  - improving   - increase PO lactulose and cont rifaximin and add zinc  - infectious work up has been negative 8/29 PN, Dr Moreno      8/29 PN, Dr Moreno              8/29 PN, Hepatology, Dr. David/  Dr. Donovan                    8/31 Treatment plan, Hepatology,  " Chu          9/1 PN, Dr. Holloway- Active Hospital Problems    9/1 PN, Dr. Holloway     Please document your best medical opinion regarding the etiology of   Hepatic encephalopathy  ?       [   ]  Hepatocellular carcinoma   [ x  ]  Hepatitis C cirrhosis   [   ] Other etiology (please specify):___________________   [  ] Clinically Undetermined       Present on admission (POA) status:   [   ] Yes (Y)                          [  ] Clinically Undetermined (W)  [   ] No (N)                            [   ] Documentation insufficient to determine if condition is POA (U)         Please document in your progress notes daily for the duration of treatment, until resolved, and include in your discharge summary.

## 2020-09-01 NOTE — PT/OT/SLP EVAL
Physical Therapy Evaluation    Patient Name:  Nii Davidson   MRN:  [id number]    Recommendations:     Discharge Recommendations:  home health PT   Discharge Equipment Recommendations: bedside commode, shower chair, walker, rolling   Barriers to discharge: Inaccessible home, Decreased caregiver support and risk of falls    Assessment:     Nii Davidson is a 59 y.o. male admitted with a medical diagnosis of Decompensated cirrhosis related to hepatitis C virus (HCV).  He presents with the following impairments/functional limitations:  weakness, impaired endurance, impaired self care skills, impaired functional mobilty, gait instability, impaired balance, impaired cognition, decreased lower extremity function, decreased safety awareness. Patient required maximum encouragement and education to participate with PT. He demonstrates self-limiting behavior, which is limiting his mobility. He was able to ambulate ~16 feet using RW and CGA from PT, but he showed impulsivity, decreased safety awareness, and aggravation. Upon d/c, PT recommends home health to address the above deficits and help him reach his maximum level of independent function.     Rehab Prognosis: Fair; patient would benefit from acute skilled PT services to address these deficits and reach maximum level of function.    Recent Surgery: * No surgery found *      Plan:     During this hospitalization, patient to be seen 2 x/week to address the identified rehab impairments via gait training, therapeutic activities, therapeutic exercises, neuromuscular re-education and progress toward the following goals:    · Plan of Care Expires:  10/01/20    Subjective     Chief Complaint: going to have a BM if he stood up   Patient/Family Comments/goals: to return back to his living environment ([facility] in [place])  Pain/Comfort:  Pain Rating 1: 0/10    Patients cultural, spiritual, Gnosticist conflicts given the current situation: no    Living  Environment:  Patient currently lives at [facility] in [place].   Prior to admission, patients level of function was independent. Patient reports not working and not driving. Equipment used at home: walker, standard.  DME owned (not currently used): none.  Upon discharge, patient will have limited access to assistance at home.     Objective:     Communicated with RN prior to session.  Patient found HOB elevated with bed alarm, peripheral IV(AvaSys)  upon PT entry to room.    General Precautions: Standard, fall   Orthopedic Precautions:N/A   Braces: N/A     Exams:    Cognitive Exam  Patient is A&O x4 and follows 100% of one -step commands    Postural Exam Patient presented with the following abnormalities:    -       Rounded shoulders  -       Forward head  -       Kyphosis  -       Posterior pelvic tilt   Sensation    -       Light touch intact, he reported numbness   Skin Integrity/Edema     -       Skin integrity: visibly intact  -       Edema: none   R LE ROM WNL   R LE Strength 4/5 hip flexion, knee ext/flex, and ankle DF/PF   L LE ROM WNL   L LE Strength  4/5 hip flexion, knee ext/flex, and ankle DF/PF       Balance   Static Sitting Stand by assistance, used UEs for support   Dynamic Sitting Stand by assistance, used UEs for support   Static Standing Stand by assistance, used RW for support   Dynamic Standing       Stand by assistance, used RW for support         Functional Mobility:    Bed Mobility  Rolling to L: stand by assistance, required verbal cues for hand placement  Supine to Sit on the L side:  stand by assistance, required verbal cues for hand placement  Sit to supine: stand by assistance  Scoot to EOB in sitting: stand by assistance, required verbal cues for hand and foot placement   Transfers Sit to Stand:  stand by assistance, required verbal cues for hand placement    Gait  Gait Distance: ~16 ft with RW  Assistance Level: contact guard assistance  Description: decreased step length,  decreased kenna, decreased foot clearance, kyphotic posture, impulsive movements, decreased safety awareness, unstable    Gait Training: verbal cues for walker negotiation, verbal cues for maintaining upright trunk and taking safe and controlled steps; unwilling/unable to modify movements despite cues        Therapeutic Activities and Exercises:   Patient safe to ambulate with RN x 1 assist.   Educated patient on the importance of mobility to prevent deconditioning and to increase strength and endurance.   Discussed POC and roles and goals of physical therapy in the acute care setting. Patient expressed understanding and agreement.     AM-PAC 6 CLICK MOBILITY  Total Score:20     Patient left HOB elevated with all lines intact, call button in reach and bed alarm on.    GOALS:   Multidisciplinary Problems     Physical Therapy Goals        Problem: Physical Therapy Goal    Goal Priority Disciplines Outcome Goal Variances Interventions   Physical Therapy Goal     PT, PT/OT Ongoing, Progressing     Description: Goals to be met by: 2020     Patient will increase functional independence with mobility by performin. Supine to sit with Bracken  2. Sit to stand transfer with Modified Bracken  3. Gait  x 150 feet with Modified Bracken using LRAD.  4. Stand for 10 minutes with Modified Bracken using LRAD while reaching out of ADRIEL to perform ADLs.   5. Lower extremity exercise program x15 reps per handout, with independence                     History:     Past Medical History:   Diagnosis Date    Cirrhosis     Encounter for blood transfusion     Hepatitis C     Hypertension        Past Surgical History:   Procedure Laterality Date    EXPLORATORY LAPAROTOMY         Time Tracking:     PT Received On: 20  PT Start Time: 1005     PT Stop Time: 1027  PT Total Time (min): 22 min     Billable Minutes: Evaluation 12 and Gait Training 10      SLY Levine  2020

## 2020-09-01 NOTE — PLAN OF CARE
Payor: MEDICAID / Plan: [place] / Product Type: Managed Medicaid /           09/01/20 1513   Discharge Assessment   Assessment Type Discharge Planning Assessment   Assessment information obtained from? Medical Record   Prior to hospitilization cognitive status: Alert/Oriented   Prior to hospitalization functional status: Independent   Current cognitive status: Unable to Assess   Current Functional Status: Needs Assistance   Lives With alone   Is patient able to care for self after discharge? Unable to determine at this time (comments)   Who are your caregiver(s) and their phone number(s)? Armani Davidson (brother) [phone]   Readmission Within the Last 30 Days no previous admission in last 30 days   Equipment Currently Used at Home none   Does the patient receive services at [facility]? No   Discharge Plan A Home   Discharge Plan B Home   DME Needed Upon Discharge  other (see comments)  (TBD)   Patient/Family in Agreement with Plan unable to assess
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
## 2020-09-01 NOTE — PLAN OF CARE
Problem: Fall Injury Risk  Goal: Absence of Fall and Fall-Related Injury  Outcome: Ongoing, Progressing     Problem: Adult Inpatient Plan of Care  Goal: Plan of Care Review  Outcome: Ongoing, Progressing  Goal: Patient-Specific Goal (Individualization)  Outcome: Ongoing, Progressing  Goal: Absence of Hospital-Acquired Illness or Injury  Outcome: Ongoing, Progressing  Goal: Optimal Comfort and Wellbeing  Outcome: Ongoing, Progressing  Goal: Readiness for Transition of Care  Outcome: Ongoing, Progressing  Goal: Rounds/Family Conference  Outcome: Ongoing, Progressing     Problem: Electrolyte Imbalance (Acute Kidney Injury/Impairment)  Goal: Serum Electrolyte Balance  Outcome: Ongoing, Progressing     Problem: Fluid Imbalance (Acute Kidney Injury/Impairment)  Goal: Optimal Fluid Balance  Outcome: Ongoing, Progressing     Problem: Hematologic Alteration (Acute Kidney Injury/Impairment)  Goal: Hemoglobin, Hematocrit and Platelets Within Normal Range  Outcome: Ongoing, Progressing     Problem: Oral Intake Inadequate (Acute Kidney Injury/Impairment)  Goal: Optimal Nutrition Intake  Outcome: Ongoing, Progressing     Problem: Renal Function Impairment (Acute Kidney Injury/Impairment)  Goal: Effective Renal Function  Outcome: Ongoing, Progressing     Problem: Skin Injury Risk Increased  Goal: Skin Health and Integrity  Outcome: Ongoing, Progressing     Problem: Coping Ineffective  Goal: Effective Coping  Outcome: Ongoing, Progressing    No acute or significant changes overnight. Pt. Arrived to [facility] with no c/o pain. CIWA scores  were negative. No PRN Ativan was given. Pt. Had 1 loose stool. Tele sitter is at the bedside.

## 2020-09-01 NOTE — CONSULTS
Palliative Care Acknowledgement of Consult - .9/1/2020  Consult received. Palliative Care Provider MARSHALL Kuhn, APRN, ACNS-BC has discussed patient with .    On initial assessment with palliative medicine Mr. Davidson was not amenable to discussions with palliative medicine.  States he is tired and does not want to talk with anyone.   Upon return visit, Mr. Davidson remains opposed to talking with palliative medicine.     Dr. Holloway aware who disclosed talking with the patient's brother Armani and that the patient now has DNR.      Palliative medicine APRN spoke with brother Armani Davidson by telephone.  Armani states he is reluctant to be the patient's decision maker.  Armani also states the patient has an adult daughter, Alba Berg. Alba  remains involved in Mr. Davidson's life.  Daughter Alba is the patient's legal next of kin for medical decisions. Primary team is aware.   Palliative medicine had brief conversation with Alba, who states she will continue discussion with palliative medicine in the AM 9/2/2020.     Full consult to follow.    Thank you for allowing us to be a part of the care of this patient.

## 2020-09-01 NOTE — HPI
"HPI obtained from chart review.     Mr. Matos is a 60 yo gentleman with PMH of: hepatitis C cirrhosis, opiate abuse, and hypertension.     He was found by a food delivery service confused and shaking.  Presented to ED via EMS with altered mental status.  EMS reports the patient was somnolent and confused.  Found to have a decreased respiratory rate and treated with Narcan 0.5 mg. EMS reports mental status and respiratory rate improved.   Mr. Davidson reports he had been feeling feverish and in pain for the past day.  He self treated with hydrocodone. He also states having  history of "liver cancer" although review of chart is not clear.      Per chart review: lab results from  7/17 ordered by Dr. Simmons at [facility] indicate , PTT 34, PT 13.8, INR 1.3, albumin 2.5, and AFP elevated 63.   Previously seen in 2014 at [facility] with Hepatis C Cirrhosis. Grade II esophageal varices were seen in EGD in and banded at that time.    As per Mr. Davidson he was to begin treatment of the HCC at [facility] at this time.  Will be presented to IR conference today.   "

## 2020-09-01 NOTE — PLAN OF CARE
Palliative APRN and this  visited patient's room. Patient answered a few questions. Patient stated he lives in [place], is  and has one daughter. Patient then stated being tired and would not answer any more questions.     Edi Anderson, [facility]  Palliative Medicine

## 2020-09-01 NOTE — MEDICAL/APP STUDENT
"9/1/2020 8:50 AM  Nii Davidson  1960  [id number]      ADDICTION CONSULT INITIAL EVALUATION     DEPARTMENT:  Psychiatry  SITE: [facility] [address]    DATE OF ADMISSION: 8/28/2020  3:34 PM  LENGTH OF STAY: 4 days    EXAMINING PRACTITIONER: Rafiq Rizzo    CONSULT REQUESTED BY: Stas Holloway MD      SUBJECTIVE     CHIEF COMPLAINT  Nii Davidson is a 59 y.o. male who is seen today for an initial psychiatric evaluation by the addiction psychiatry consult service.  Nii Davidson presents with the chief complaint of: pre-transplant evaluation      HISTORY OF PRESENT ILLNESS    Per Primary MD:  Nii Davidson is a 59 year old male with history of hepatitis C cirrhosis, opiate abuse, and hypertension who presents today with altered mental status. Patient was found by food delivery service confused and shaking and was brought to the emergency department by EMS who stated that patient was confused and somnolent. He was given 0.5 mg of narcan by EMS after he was found to have depressed respiratory rate. They stated that it improved his mental status and respiratory rate. He stated that he may have had fevers over the past day but did not elaborate. He describes taking hydrocodone in the morning for nondescript pain. Patient also describes having a past medical history of "liver cancer" although review of chart is not clear.      There are recent laboratory results on 7/17 ordered by Dr. Simmons at [facility]. Of note, PTT 34, PT 13.8, INR 1.3, albumin 2.5, and AFP elevated 63. Previously seen in 2014 at [facility] with Hepatis C Cirrhosis. Grade II esophageal varices were seen in EGD in and banded at that time.    Per Addiction Psych MD:  Alert & oriented to person, place, year, month, situation, and president. He seems improved in mentation but still slightly confused. ~15m into interview, patient closed eyes and refused to answer further questions. He says that " he wants to get his living situation setup before further discussion of transplant.    COLLATERAL  Mrs. Rita Davidson  C: [phone]     Rita last spoke with him a few weeks ago at the motel he's been staying at and they were concerned as he was incoherent at that time. They asked the manager to do a wellness check but he just told them that he seemed sleepy.     She is unaware how much he's been drinking. She says that he's been drinking and using drugs for his whole life now.     He was staying at an in-house rehab stay at [facility] and completed the program in April. After d/c, he was homeless and his SW helped set him up at the [facility] in April. His SW has been taking him to his doctor appointments and working on arranging for permanent housing.     Ally Esparza, [facility]  [phone] ext. [phone]   says he was due to have a lease signing today for his apartment. He was also supposed to have an appointment at [facility] to start chemo for HCC. She doesn't believe that he has been drinking. She says that he is typically honest regarding his drinking. He apparently told her that he hadn't drank in a year and didn't have an issue with the drinking but went into [facility] to get off the streets.    SUBSTANCE ABUSE HISTORY  Substance(s) of Choice: EtOH, nicotine  Substances Used: EtOH  History of IVDU?: yes  Use of Alcohol: yes  Average Consumption: 1-2 beers  Last Drink: week before  Use of Medications for Alcohol/Opioid Use Disorder: denies  History of Complicated Withdrawal: ***  History of Detox: yes  Rehab History: denies  AA/NA involvement: ***  Tobacco: yes - .5 PPD  Spouse/Partner Consumption: N/A  Patient Aware of Biomedical Complications: ***    DSM-5 SUBSTANCE USE DISORDER CRITERIA   Mild (1-3), Moderate (4-5), Severe (?6)  1. Often take in larger amounts or over a longer period of time than was intended.  2. Persistent desire or unsuccessful efforts to cut down or control  use.  3. Great deal of time spent in activities necessary to obtain substance, use, or recover from effects.  4. Craving/strong desire for substance or urge to use.  5. Use resulting in failure to fulfill major role obligations at home, work or school.  6. Social, occupational, recreational activities decreased because of use.  7. Continued use despite having persistent or recurrent social or interpersonal problems cause or exaserbated by the substance.  8. Recurrent use in situations in which it is physically hazardous.  9. Use despite physical or psychological problems that are likely to have been caused or exacerbated by the substance.  10. Tolerance, as defined by either of the following.   A. A need for markedly increased amounts of substance to achieve intoxication or desired effect. -OR-    B. A markedly diminished effect with continued use of the same amount of substance.  11. Withdrawal, as manifested by the following.   A. The characteristic withdrawal syndrome for substance. -AND-   B. Substance is taken to relieve or avoid withdrawal symptoms.    ARE THE CRITERIA MET FOR DSM-5 SUBSTANCE USE DISORDER: ***      TRANSPLANT EVALUATION  Date first informed of impending organ failure - 8/27/2014  When was the subject of transplantation first broached - 8/31/20  Pt made the following lifestyle adjustments and how - ***  Does the patient accept/understand the connection between substance use and subsequent organ failure - ***  Date of last use of substances - ***  Understands need for lifetime medication - ***  Understands need for lifetime sobriety - ***  View of AA/NA - ***  Social support - sister-in-law and brother call and check up on him and offer financial support      PSYCHIATRIC HISTORY  Reported Diagnose(s): ***  Previous Medication Trials: ***  Previous Psychiatric Hospitalizations: ***  Outpatient Psychiatrist?: ***      SUICIDE/HOMICIDE RISK ASSESSMENT  Current/active suicidal ideation/plan/intent:  ***  Previous suicide attempts: ***  Current/active homicidal ideation/plan/intent: ***      HISTORY OF TRAUMA, ABUSE & VIOLENCE  Trauma: ***  Physical Abuse: ***  Sexual Abuse: ***  Violent Conduct: ***    Access to Gun: ***       FAMILY PSYCHIATRIC HISTORY   ***       SOCIAL HISTORY  He has been staying at [facility] for the last few months arranged by PORSHA. She had scheduled a lease signing for housing on 8/31 which he was unable to attend d/t hospitalization. His sister-in-law and brother check up on him when they can but live in [place] and are therefore limited in what they can do. They offer some financial support as well. His sister is minimally involved d/t continued difficulties with his substance use issues.    PAST MEDICAL HISTORY   Hepatocellular Carcinoma  Cirrhosis  H/o HCV  HTN    PSYCHOSOCIAL FACTORS  Stressors (Psychosocial and Environmental): financial, health and drug and alcohol.       PSYCHIATRIC ROS  ***    MEDICAL ROS    Complete review of systems performed covering Constitutional, Eyes, ENT/Mouth, Cardiovascular, Respiratory, Gastrointestinal, Genitourinary, Musculoskeletal, Skin, Neurologic, Endocrine, Heme/Lymph, and Allergy/Immune.     Complete review of systems was negative with the exception of the following positive symptoms: ***      ALLERGIES  Patient has no known allergies.      MEDICATIONS    Psychotropics:  ***    Infusions:      Scheduled:   enoxaparin  40 mg Subcutaneous Q24H    folic acid  1 mg Oral Daily    lactulose  30 g Oral TID    rifAXImin  550 mg Oral BID    thiamine  100 mg Oral Daily    zinc sulfate  220 mg Oral Daily       PRN:  dextrose 50%, dextrose 50%, glucagon (human recombinant), glucose, glucose, sodium chloride 0.9%    Home Medications:  Prior to Admission medications    Medication Sig Start Date End Date Taking? Authorizing Provider   cetirizine (ZYRTEC) 10 MG tablet Take 10 mg by mouth once daily.   Yes Historical Provider, MD   cyanocobalamin (VITAMIN B-12) 1000  MCG tablet Take 100 mcg by mouth once daily.   Yes Historical Provider, MD   folic acid (FOLVITE) 1 MG tablet Take 1 mg by mouth once daily.   Yes Historical Provider, MD   furosemide (LASIX) 20 MG tablet Take 20 mg by mouth once daily.   Yes Historical Provider, MD   gabapentin (NEURONTIN) 600 MG tablet Take 600 mg by mouth 2 (two) times daily.   Yes Historical Provider, MD   HYDROcodone-acetaminophen (NORCO) 7.5-325 mg per tablet Take 1 tablet by mouth every 6 (six) hours as needed for Pain.   Yes Historical Provider, MD   loperamide (IMODIUM) 2 mg capsule Take 2 mg by mouth 4 (four) times daily as needed for Diarrhea.   Yes Historical Provider, MD   megestroL (MEGACE) 400 mg/10 mL (40 mg/mL) Susp Take 800 mg by mouth once daily.   Yes Historical Provider, MD   mirtazapine (REMERON) 15 MG tablet Take 15 mg by mouth every evening.   Yes Historical Provider, MD   multivitamin with folic acid (THERA) 400 mcg Tab Take 1 tablet by mouth once daily.   Yes Historical Provider, MD   naproxen (NAPROSYN) 250 MG tablet Take 250 mg by mouth 2 (two) times daily.   Yes Historical Provider, MD   spironolactone (ALDACTONE) 25 MG tablet Take 25 mg by mouth once daily.   Yes Historical Provider, MD         OBJECTIVE:     EXAMINATION    Vitals:    09/01/20 0000 09/01/20 0400 09/01/20 0512 09/01/20 0833   BP: (!) 98/54  (!) 105/55 (!) 94/56   BP Location: Right arm  Left leg Left arm   Patient Position: Lying  Lying Lying   Pulse: 85  87 79   Resp: 20 20 17   Temp: 97.7 °F (36.5 °C)  98.4 °F (36.9 °C) 98.1 °F (36.7 °C)   TempSrc: Oral  Oral Oral   SpO2: (!) 93%  96% (!) 91%   Weight:  67.1 kg (147 lb 14.9 oz)     Height:           PAIN   0/10    CONSTITUTIONAL  General Appearance and Manner: NAD, less involved and more sleepy throughout interview    MUSCULOSKELETAL  Abnormal Involuntary Movements: none  Gait and Station: n/a    PSYCHIATRIC   Orientation: awake, alert, ***  Speech: slow rate, low volume  Language: English  "fluent  Mood: "***"  Affect: constricted  Thought Process: linear  Associations: intact; no AZIZA  Thought Content: *** no SI/HI/AVH  Memory: ***   Attention and Concentration: poor   Fund of Knowledge: ***  Insight: poor  Judgment: poor    ASSESSMENT:     DIAGNOSES & PROBLEMS    ***      STRENGTHS AND LIABILITIES   {PSY STRENGTHS/LIABILITIES:20514}      MOTIVATION TO PURSUE RECOVERY: {desc; high/low:38051}    ACCEPTANCE OF ADDICTION: {Progress:20262}    ABILITY TO ADHERE TO TREATMENT PLAN: {desc; high/low:39368}      PLAN:       TRANSPLANT SUITABILITY  From a psychiatric perspective, this patient presents as a *** risk for transplant.      MANAGEMENT PLAN, TREATMENT GOALS, THERAPEUTIC TECHNIQUES/APPROACHES & CLINICAL REASONING    ***      · Patient counseled on abstinence from alcohol and substances of abuse (illicit and prescription).  · Additional workup planned to address substance use disorder, in order to guide and refine ongoing management options, includes serial alcohol and drug laboratory testing (e.g. PETH, urine toxicology).  · Relapse prevention and motivational interviewing provided.  · Education provided on 12 step recovery programs.      PRESCRIPTION DRUG MANAGEMENT  - The risks and benefits of medication were discussed with this patient.  - Possible expectable adverse effects of any current or proposed individual psychotropic agents were discussed with this patient.  - Counseling was provided on the importance of full compliance with medication regimens.      In cases of emergency, daily coverage provided by Acute/ER Psych MD, NP, or SW, with contact numbers located in [facility] On Call Schedule    Case discussed with staff addiction psychiatrist: ANNIE GIBSON***, MD      Labs/Imaging/Studies:   Recent Results (from the past 24 hour(s))   Echo Color Flow Doppler? Yes; Bubble Contrast? Yes    Collection Time: 08/31/20  3:48 PM   Result Value Ref Range    Ascending aorta 3.03 cm    STJ " 2.93 cm    AV mean gradient 11 mmHg    Ao peak guy 2.18 m/s    Ao VTI 36.38 cm    IVRT 65.65 msec    IVS 0.93 0.6 - 1.1 cm    LA size 4.77 cm    Left Atrium Major Axis 4.62 cm    Left Atrium Minor Axis 4.88 cm    LVIDD 3.33 (A) 3.5 - 6.0 cm    LVIDS 2.22 2.1 - 4.0 cm    LVOT diameter 2.08 cm    LVOT peak VTI 16.05 cm    PW 0.95 0.6 - 1.1 cm    MV Peak A Guy 1.16 m/s    E wave decelartion time 246.99 msec    MV Peak E Guy 1.05 m/s    PV Peak D Guy 0.44 m/s    PV Peak S Guy 0.76 m/s    RA Major Axis 3.92 cm    RA Width 3.17 cm    RVDD 4.25 cm    Sinus 3.00 cm    TAPSE 2.36 cm    TR Max Guy 2.49 m/s    TDI LATERAL 0.13 m/s    TDI SEPTAL 0.08 m/s    LA WIDTH 4.26 cm    MV stenosis pressure 1/2 time 71.63 ms    LV Diastolic Volume 44.96 mL    LV Systolic Volume 16.52 mL    RV S' 20.05 cm/s    LVOT peak guy 0.92 m/s    LV LATERAL E/E' RATIO 8.08 m/s    LV SEPTAL E/E' RATIO 13.13 m/s    FS 33 %    LA volume 81.98 cm3    LV mass 87.54 g    Left Ventricle Relative Wall Thickness 0.57 cm    AV valve area 1.50 cm2    AV Velocity Ratio 0.42     AV index (prosthetic) 0.44     MV valve area p 1/2 method 3.07 cm2    E/A ratio 0.91     Mean e' 0.11 m/s    Pulm vein S/D ratio 1.73     LVOT area 3.4 cm2    LVOT stroke volume 54.51 cm3    AV peak gradient 19 mmHg    E/E' ratio 10.00 m/s    LV Systolic Volume Index 8.8 mL/m2    LV Diastolic Volume Index 24.02 mL/m2    LA Volume Index 43.8 mL/m2    LV Mass Index 47 g/m2    Triscuspid Valve Regurgitation Peak Gradient 25 mmHg    BSA 1.85 m2    Right Atrial Pressure (from IVC) 3 mmHg    TV rest pulmonary artery pressure 28 mmHg   CBC with Automated Differential    Collection Time: 09/01/20  7:14 AM   Result Value Ref Range    WBC 5.03 3.90 - 12.70 K/uL    RBC 2.88 (L) 4.60 - 6.20 M/uL    Hemoglobin 10.5 (L) 14.0 - 18.0 g/dL    Hematocrit 31.2 (L) 40.0 - 54.0 %    Mean Corpuscular Volume 108 (H) 82 - 98 fL    Mean Corpuscular Hemoglobin 36.5 (H) 27.0 - 31.0 pg    Mean Corpuscular  Hemoglobin Conc 33.7 32.0 - 36.0 g/dL    RDW 14.9 (H) 11.5 - 14.5 %    Platelets 71 (L) 150 - 350 K/uL    MPV 10.5 9.2 - 12.9 fL    Immature Granulocytes 0.4 0.0 - 0.5 %    Gran # (ANC) 2.6 1.8 - 7.7 K/uL    Immature Grans (Abs) 0.02 0.00 - 0.04 K/uL    Lymph # 0.9 (L) 1.0 - 4.8 K/uL    Mono # 1.4 (H) 0.3 - 1.0 K/uL    Eos # 0.1 0.0 - 0.5 K/uL    Baso # 0.03 0.00 - 0.20 K/uL    nRBC 0 0 /100 WBC    Gran% 50.9 38.0 - 73.0 %    Lymph% 18.7 18.0 - 48.0 %    Mono% 27.4 (H) 4.0 - 15.0 %    Eosinophil% 2.0 0.0 - 8.0 %    Basophil% 0.6 0.0 - 1.9 %    Differential Method Automated    PT/INR    Collection Time: 09/01/20  7:14 AM   Result Value Ref Range    Prothrombin Time 15.1 (H) 9.0 - 12.5 sec    INR 1.4 (H) 0.8 - 1.2   PTT    Collection Time: 09/01/20  7:14 AM   Result Value Ref Range    aPTT 44.4 (H) 21.0 - 32.0 sec      Imaging Results           US Liver with Doppler (xpd) (Final result)  Result time 08/29/20 03:25:50    Final result by Josias Han MD (08/29/20 03:25:50)                 Impression:      Findings compatible with hepatic cirrhosis.  There is an isoechoic approximately 4.1 cm mass within the inferior right hepatic lobe.  This is not well delineated on prior CT without contrast and may represent a regenerative nodule, however MRI is recommended for further evaluation to exclude hepatocellular carcinoma.    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    Cholelithiasis and biliary sludge without additional sonographic evidence of acute cholecystitis.    The spleen is not able to be visualized on this study.    This report was flagged in Epic as abnormal.  .    Electronically signed by resident: Ban Deng  Date:    08/29/2020  Time:    03:06    Electronically signed by: Josias Han MD  Date:    08/29/2020  Time:    03:25             Narrative:    EXAMINATION:  US LIVER WITH DOPPLER    CLINICAL HISTORY:  decompensated cirrhosis; r/o pv thormbosis;    TECHNIQUE:  Duplex scan of the liver was performed  using B-mode/gray scale imaging and Doppler spectral analysis and color flow.    COMPARISON:  CT abdomen pelvis without contrast 08/28/2020    FINDINGS:  The liver measures 10.7 cm and demonstrates a nodular contour suggesting cirrhosis.  There is a 3.8 x 3.2 x 4.1 cm isoechoic mass within the inferior right hepatic lobe with mild increased vascularity..    No intra or extrahepatic bile duct dilatation. The common duct measures 5 mm.    The middle, right, and left hepatic veins are patent and unremarkable.    The main and right branches of the portal vein demonstrate hepatopedal flow and are unremarkable.  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    The pancreas and visualized aorta are unremarkable.    The gallbladder is filled with biliary sludge and stones, largest measuring 1.4 cm.  No pericholecystic fluid or wall thickening.  No sonographic Blanco's sign.    The spleen is not visualized.    The hepatic arterial system is unremarkable.    The IVC is unremarkable.    There is no ascites.                               CT Head Without Contrast (Final result)  Result time 08/28/20 21:46:48    Final result by Josias Han MD (08/28/20 21:46:48)                 Impression:      No acute intracranial abnormalities.    Raoul cisterna magna.  Senescent changes.      Electronically signed by: Josias Han MD  Date:    08/28/2020  Time:    21:46             Narrative:    EXAMINATION:  CT HEAD WITHOUT CONTRAST    CLINICAL HISTORY:  Altered mental status;    TECHNIQUE:  Low dose axial images were obtained through the head.  Coronal and sagittal reformations were also performed. Contrast was not administered.    COMPARISON:  None.    FINDINGS:  The brain parenchyma appears normal for age with good corticomedullary differentiation.  There is no evidence of acute infarct, hemorrhage, or mass.  There is ventricular and sulcal enlargement consistent with generalized atrophy.  Mild periventricular decreased supratentorial white  matter attenuation most likely related to chronic nonspecific small vessel disease.   No mass-effect or midline shift.  Raoul cisterna magna noted.  The paranasal sinuses and mastoid air cells are essentially clear .  The calvarium appears intact.  .                               X-Ray Chest AP Portable (Final result)  Result time 08/28/20 20:38:25    Final result by Josias Han MD (08/28/20 20:38:25)                 Impression:      Nonspecific diffuse increased interstitial attenuation in the lungs, similar to prior.    No significant change from prior study.      Electronically signed by: Josias Han MD  Date:    08/28/2020  Time:    20:38             Narrative:    EXAMINATION:  XR CHEST AP PORTABLE    CLINICAL HISTORY:  r/o infection;    TECHNIQUE:  Single frontal view of the chest was performed.    COMPARISON:  October 13, 2019.    FINDINGS:  Nonspecific diffuse increased interstitial attenuation in the lungs, similar to prior.    Heart and lungs  appear unchanged when allowing for differences in technique and positioning.    Surgical clips below the left hemidiaphragm, similar to prior.  Osseous structures appear unchanged.

## 2020-09-01 NOTE — PHARMACY MED REC
"Admission Medication Reconciliation - Pharmacy Consult Note    The home medication history was taken by Lynn Schmitz, Pharmacy Tech.     You may go to "Admission" then "Reconcile Home Medications" tabs to review and/or act upon these items.     Potentially problematic discrepancies with current MAR  o Patient IS taking the following which was not ordered upon admit  o Furosemide 20mg PO daily  o Spironolactone 25mg PO daily  o Gabapentin 600mg PO BID  o Mirtazapine 15mg PO QHS  o Megace 800mg PO daily (would not continue this as patient now presents with PVT)    Yuki Webster, PharmD, BCPS  [id number]                  .    .          "

## 2020-09-01 NOTE — PLAN OF CARE
Problem: Physical Therapy Goal  Goal: Physical Therapy Goal  Description: Goals to be met by: 2020     Patient will increase functional independence with mobility by performin. Supine to sit with [place]  2. Sit to stand transfer with Modified [place]  3. Gait  x 150 feet with Modified [place] using LRAD.  4. Stand for 10 minutes with Modified [place] using LRAD while reaching out of ADRIEL to perform ADLs.   5. Lower extremity exercise program x15 reps per handout, with independence    Outcome: Ongoing, Progressing     Initial evaluation complete. Goals and POC established based on patient's current level of function.     Manisha Rivers, SLY  2020

## 2020-09-02 VITALS
BODY MASS INDEX: 20.53 KG/M2 | OXYGEN SATURATION: 98 % | HEIGHT: 71 IN | SYSTOLIC BLOOD PRESSURE: 132 MMHG | WEIGHT: 146.63 LBS | DIASTOLIC BLOOD PRESSURE: 73 MMHG | TEMPERATURE: 97 F | RESPIRATION RATE: 18 BRPM | HEART RATE: 78 BPM

## 2020-09-02 LAB
ALBUMIN SERPL BCP-MCNC: 1.7 G/DL (ref 3.5–5.2)
ALP SERPL-CCNC: 66 U/L (ref 55–135)
ALT SERPL W/O P-5'-P-CCNC: 35 U/L (ref 10–44)
ANION GAP SERPL CALC-SCNC: 5 MMOL/L (ref 8–16)
AST SERPL-CCNC: 74 U/L (ref 10–40)
BACTERIA BLD CULT: NORMAL
BACTERIA BLD CULT: NORMAL
BASOPHILS # BLD AUTO: 0.02 K/UL (ref 0–0.2)
BASOPHILS NFR BLD: 0.4 % (ref 0–1.9)
BILIRUB SERPL-MCNC: 2.5 MG/DL (ref 0.1–1)
BUN SERPL-MCNC: 11 MG/DL (ref 6–20)
CALCIUM SERPL-MCNC: 7.1 MG/DL (ref 8.7–10.5)
CHLORIDE SERPL-SCNC: 109 MMOL/L (ref 95–110)
CO2 SERPL-SCNC: 19 MMOL/L (ref 23–29)
CREAT SERPL-MCNC: 0.7 MG/DL (ref 0.5–1.4)
DIFFERENTIAL METHOD: ABNORMAL
EOSINOPHIL # BLD AUTO: 0.1 K/UL (ref 0–0.5)
EOSINOPHIL NFR BLD: 1.6 % (ref 0–8)
ERYTHROCYTE [DISTWIDTH] IN BLOOD BY AUTOMATED COUNT: 15.1 % (ref 11.5–14.5)
EST. GFR  (AFRICAN AMERICAN): >60 ML/MIN/1.73 M^2
EST. GFR  (NON AFRICAN AMERICAN): >60 ML/MIN/1.73 M^2
GLUCOSE SERPL-MCNC: 80 MG/DL (ref 70–110)
HCT VFR BLD AUTO: 31.4 % (ref 40–54)
HGB BLD-MCNC: 10.8 G/DL (ref 14–18)
IMM GRANULOCYTES # BLD AUTO: 0.03 K/UL (ref 0–0.04)
IMM GRANULOCYTES NFR BLD AUTO: 0.6 % (ref 0–0.5)
INR PPP: 1.4 (ref 0.8–1.2)
LYMPHOCYTES # BLD AUTO: 1.1 K/UL (ref 1–4.8)
LYMPHOCYTES NFR BLD: 22.5 % (ref 18–48)
MCH RBC QN AUTO: 37.4 PG (ref 27–31)
MCHC RBC AUTO-ENTMCNC: 34.4 G/DL (ref 32–36)
MCV RBC AUTO: 109 FL (ref 82–98)
MONOCYTES # BLD AUTO: 1.4 K/UL (ref 0.3–1)
MONOCYTES NFR BLD: 28.9 % (ref 4–15)
NEUTROPHILS # BLD AUTO: 2.2 K/UL (ref 1.8–7.7)
NEUTROPHILS NFR BLD: 46 % (ref 38–73)
NRBC BLD-RTO: 0 /100 WBC
PLATELET # BLD AUTO: 70 K/UL (ref 150–350)
PMV BLD AUTO: 10.8 FL (ref 9.2–12.9)
POTASSIUM SERPL-SCNC: 3.9 MMOL/L (ref 3.5–5.1)
PROT SERPL-MCNC: 5.9 G/DL (ref 6–8.4)
PROTHROMBIN TIME: 15.4 SEC (ref 9–12.5)
RBC # BLD AUTO: 2.89 M/UL (ref 4.6–6.2)
SODIUM SERPL-SCNC: 133 MMOL/L (ref 136–145)
VIT B1 BLD-MCNC: 46 UG/L (ref 38–122)
WBC # BLD AUTO: 4.85 K/UL (ref 3.9–12.7)

## 2020-09-02 PROCEDURE — 85025 COMPLETE CBC W/AUTO DIFF WBC: CPT

## 2020-09-02 PROCEDURE — 80053 COMPREHEN METABOLIC PANEL: CPT

## 2020-09-02 PROCEDURE — 99233 PR SUBSEQUENT HOSPITAL CARE,LEVL III: ICD-10-PCS | Mod: ,,, | Performed by: CLINICAL NURSE SPECIALIST

## 2020-09-02 PROCEDURE — 99239 HOSP IP/OBS DSCHRG MGMT >30: CPT | Mod: ,,, | Performed by: HOSPITALIST

## 2020-09-02 PROCEDURE — 85610 PROTHROMBIN TIME: CPT

## 2020-09-02 PROCEDURE — 25000003 PHARM REV CODE 250: Performed by: STUDENT IN AN ORGANIZED HEALTH CARE EDUCATION/TRAINING PROGRAM

## 2020-09-02 PROCEDURE — 99239 PR HOSPITAL DISCHARGE DAY,>30 MIN: ICD-10-PCS | Mod: ,,, | Performed by: HOSPITALIST

## 2020-09-02 PROCEDURE — 99497 ADVNCD CARE PLAN 30 MIN: CPT | Mod: ,,, | Performed by: CLINICAL NURSE SPECIALIST

## 2020-09-02 PROCEDURE — 99497 PR ADVNCD CARE PLAN 30 MIN: ICD-10-PCS | Mod: ,,, | Performed by: CLINICAL NURSE SPECIALIST

## 2020-09-02 PROCEDURE — 99233 SBSQ HOSP IP/OBS HIGH 50: CPT | Mod: ,,, | Performed by: CLINICAL NURSE SPECIALIST

## 2020-09-02 PROCEDURE — 25000003 PHARM REV CODE 250: Performed by: HOSPITALIST

## 2020-09-02 PROCEDURE — 36415 COLL VENOUS BLD VENIPUNCTURE: CPT

## 2020-09-02 RX ORDER — LACTULOSE 10 G/15ML
20 SOLUTION ORAL 3 TIMES DAILY
Qty: 2700 ML | Refills: 2 | Status: SHIPPED | OUTPATIENT
Start: 2020-09-02 | End: 2020-10-02

## 2020-09-02 RX ADMIN — Medication 100 MG: at 10:09

## 2020-09-02 RX ADMIN — FOLIC ACID 1 MG: 1 TABLET ORAL at 10:09

## 2020-09-02 RX ADMIN — RIFAXIMIN 550 MG: 550 TABLET ORAL at 10:09

## 2020-09-02 RX ADMIN — ZINC SULFATE 220 MG (50 MG) CAPSULE 220 MG: CAPSULE at 10:09

## 2020-09-02 NOTE — PROGRESS NOTES
Progress Note   Hospital Medicine         Patient Name: Nii Davidson  MRN:  [id number]  [facility] Medicine Team: [facility] L Stas Holloway MD  Date of Admission:  8/28/2020     Length of Stay:  LOS: 4 days   Expected Discharge Date: 9/7/2020  Principal Problem:  Decompensated cirrhosis related to hepatitis C virus (HCV)       Subjective:     Interval History/Overnight Events: patient doing better today, mentation improving, worked with PT and they recommend HH; patient discussed at IR conference and they recommend y90 followed by systemic therapy, patient is set up in [facility] already and will send information to them;   - likely d/c tomorrow   - patient is now DNR after our conversation; palliative care following and appreciate their help    Review of Systems     UNABLE TO OBTAIN DUE TO PATIENT'S CONDITION     Objective:     Temp:  [97.7 °F (36.5 °C)-98.4 °F (36.9 °C)]   Pulse:  [79-94]   Resp:  [17-20]   BP: ()/(54-63)   SpO2:  [91 %-97 %]       Physical Exam:  Constitutional: appears weak and ill  Head: Normocephalic and atraumatic.   Mouth/Throat: Oropharynx is clear and moist.   Eyes: EOM are normal. Pupils are equal, round, and reactive to light. positive scleral icterus.   Neck: Normal range of motion. Neck supple.   Cardiovascular: Normal rate and regular rhythm.  No murmur heard.  Pulmonary/Chest: Effort normal and breath sounds normal. No respiratory distress. No wheezes, rales, or rhonchi  Abdominal: Soft. Bowel sounds are normal.  No distension or tenderness  Musculoskeletal: Normal range of motion. No edema.   Neurological: alert only too self    Skin: Skin is warm and dry.   Psychiatric: Normal mood and affect. Behavior is normal.     Recent Labs   Lab 08/28/20  1552 08/29/20  0358 08/30/20  0411 08/31/20  0407 09/01/20  0714   WBC 14.30* 14.49* 10.05 7.73 5.03   HGB 11.7* 10.3* 11.2* 10.5* 10.5*   HCT 33.6* 30.2* 32.8* 30.3* 31.2*   PLT 86* 71* 67* 80* 71*     Recent Labs   Lab 08/30/20  0411  08/31/20  0407 09/01/20  0714   * 132* 135*   K 3.5 3.1* 3.1*    106 108   CO2 24 21* 22*   BUN 21* 15 14   CREATININE 0.9 0.7 0.7   GLU 93 89 96   CALCIUM 7.5* 7.2* 7.2*     Recent Labs   Lab 08/30/20  0411 08/31/20  0407 09/01/20  0714   ALKPHOS 60 68 56   ALT 43 40 35   * 95* 81*   ALBUMIN 1.8* 1.7* 1.7*   PROT 5.9* 5.7* 5.7*   BILITOT 2.9* 2.8* 2.7*   INR 1.7* 1.4* 1.4*     Recent Labs   Lab 08/28/20  1559   POCTGLUCOSE 71        enoxaparin  40 mg Subcutaneous Q24H    folic acid  1 mg Oral Daily    lactulose  30 g Oral TID    mirtazapine  15 mg Oral QHS    rifAXImin  550 mg Oral BID    thiamine  100 mg Oral Daily    zinc sulfate  220 mg Oral Daily       Assessment and Plan     Mr. Nii Davidson is a 59 y.o. male who presented to [facility] on 8/28/2020 with     Hospital Course:    Mr. Nii Davidson was admitted to Hospital Medicine for management of     Active Hospital Problems    Diagnosis  POA    *Decompensated cirrhosis related to hepatitis C virus (HCV) [B19.20, K74.69]  Yes    Palliative care encounter [Z51.5]  Not Applicable    Hepatocellular carcinoma metastatic to lung [C78.00, C22.0]  Yes    Liver mass [R16.0]  Yes    Alcoholic cirrhosis of liver without ascites [K70.30]  Yes    Coagulopathy [D68.9]  Yes    Thrombocytopenia [D69.6]  Yes    Hepatic encephalopathy [K72.90]  Yes    Portal vein thrombosis [I81]  Yes    KRISTA (acute kidney injury) [N17.9]  Yes    Hyponatremia [E87.1]  Yes    Macrocytic anemia [D53.9]  Yes    Alcohol use disorder, severe, dependence [F10.20]  Yes     Chronic      Resolved Hospital Problems   No resolved problems to display.     # Acute hepatic encephalopathy  - improving   - increase PO lactulose and cont rifaximin and add zinc  - infectious work up has been negative    # Multifocal HCC with metastasis   # Portal Vein Thrombosis   - 4.1 cm, elevated AFP  - MRI ab reviewed  - CT chest to evaluate for metastasis   -  Discussed at IR  conference and they recommend y90 and systemic therapy     # Decompensated Hep C and alcohol cirrhosis without ascites  MELD-Na score: 16 at 9/1/2020  7:14 AM  MELD score: 14 at 9/1/2020  7:14 AM  Calculated from:  Serum Creatinine: 0.7 mg/dL (Rounded to 1 mg/dL) at 9/1/2020  7:14 AM  Serum Sodium: 135 mmol/L at 9/1/2020  7:14 AM  Total Bilirubin: 2.7 mg/dL at 9/1/2020  7:14 AM  INR(ratio): 1.4 at 9/1/2020  7:14 AM  Age: 59 years 11 months  - hepatology consulted  - PETH pending  - U/S liver reviewed    - not currently a liver transplant candidate  - thiamine and folic acid     # KRISTA  - likely pre-renal   - resolved     # Coagulopathy due to liver disease  - vitamin k for 3 days  - DIC labs    # Hyponatremia  - fluid restriction     # Macrocytic anemia  # Thrombocytopenia  - cont folic acid  - iron studies and folate b12 reviewed   - DIC labs      Diet:  Low sodium  GI PPx:    DVT PPx:    Goals of Care:  DNR    High Risk Conditions:  HE    Disposition:  Likely home tomorrow     Stas Holloway MD  Medical Director [facility]  Spectra:  [phone]  Pager: [phone]       Is This A New Presentation, Or A Follow-Up?: Skin Lesion How Severe Is Your Skin Lesion?: moderate Has Your Skin Lesion Been Treated?: not been treated Additional History: Pt has also noted some swelling in this wrist x 3 weeks

## 2020-09-02 NOTE — SUBJECTIVE & OBJECTIVE
Interval History:     Past Medical History:   Diagnosis Date    Cirrhosis     Encounter for blood transfusion     Hepatitis C     Hypertension        Past Surgical History:   Procedure Laterality Date    EXPLORATORY LAPAROTOMY  1989       Review of patient's allergies indicates:  No Known Allergies    Medications:  Continuous Infusions:  Scheduled Meds:  PRN Meds:    Family History     Problem Relation (Age of Onset)    Heart disease Mother    Hypertension Mother        Tobacco Use    Smoking status: Current Every Day Smoker     Packs/day: 0.50     Types: Cigarettes    Smokeless tobacco: Never Used    Tobacco comment: Patient asking for nicotine patch   Substance and Sexual Activity    Alcohol use: Yes     Alcohol/week: 3.0 standard drinks     Types: 3 Cans of beer per week    Drug use: Not Currently    Sexual activity: Not on file       Review of Systems   Unable to perform ROS: Other   Constitutional: Negative for activity change.   Patient uncooperative and refused to participate in conversation   Objective:     Vital Signs (Most Recent):  Temp: 97.1 °F (36.2 °C) (09/02/20 1123)  Pulse: 78 (09/02/20 1123)  Resp: 18 (09/02/20 1123)  BP: 132/73 (09/02/20 1123)  SpO2: 98 % (09/02/20 1123) Vital Signs (24h Range):  Temp:  [97.1 °F (36.2 °C)-99.4 °F (37.4 °C)] 97.1 °F (36.2 °C)  Pulse:  [78-94] 78  Resp:  [16-20] 18  SpO2:  [92 %-98 %] 98 %  BP: ()/(58-73) 132/73     Weight: 66.5 kg (146 lb 9.7 oz)  Body mass index is 20.45 kg/m².    Physical Exam  Vitals signs and nursing note reviewed.   Constitutional:       Appearance: He is ill-appearing.   HENT:      Head: Normocephalic and atraumatic.      Right Ear: External ear normal.      Left Ear: External ear normal.      Nose: Nose normal.      Mouth/Throat:      Mouth: Mucous membranes are moist.   Eyes:      General: Scleral icterus present.   Neck:      Musculoskeletal: Normal range of motion and neck supple.   Cardiovascular:      Rate and Rhythm:  "Normal rate and regular rhythm.      Pulses: Normal pulses.      Heart sounds: Normal heart sounds.   Pulmonary:      Effort: No respiratory distress.      Comments: Dyspnea on exertion  Abdominal:      General: There is distension.      Palpations: There is mass.      Tenderness: There is abdominal tenderness.   Musculoskeletal: Normal range of motion.   Skin:     General: Skin is warm and dry.      Capillary Refill: Capillary refill takes less than 2 seconds.      Coloration: Skin is jaundiced.   Neurological:      Mental Status: He is alert and oriented to person, place, and time.   Psychiatric:         Thought Content: Thought content normal.         Judgment: Judgment normal.      Comments: uncooperative         Advance Care Planning   Review of Symptoms    Symptom Assessment (ESAS 0-10 Scale)  Pain:  2  Dyspnea:  2  Anxiety:  0  Nausea:  0  Depression:  0  Anorexia:  2  Fatigue:  2  Insomnia:  0  Restlessness:  0  Agitation:  3     CAM / Delirium:  Negative  Constipation:  Negative  Diarrhea:  Negative and Positive    Bowel Management Plan (BMP):  Yes      Comments:  States having "some pain in abdomen" will not elaborate on it.  Mild shortness of breath on exertion           Performance Status:  60    Advanced Directives:  Living Will: No    LaPOST: No    Do Not Resuscitate Status: No    Medical Power of : No      Decision Making:  Patient answered questions    Living Arrangements:  Lives alone    Psychosocial/Cultural:  , one daughter Alba Berg, does not see often,  Does not like his son in law. One brother Armani. Cannot hold a job and now has disability.  Brother Armani states he never grew up and like to be the life of the party. Armani states he has burned his bridges with his family to the point that his family no longer wants to help him.  Armani states he h elp because he does not want to see his brother destitute and on the streets     Spiritual:  F - Nelda and Belief:  Raised Christianity " and does not practice any Mosque now.   A - Address in Care:  Not amenable to  visits         Significant Labs: All pertinent labs within the past 24 hours have been reviewed.  CBC:   Recent Labs   Lab 09/02/20 0319   WBC 4.85   HGB 10.8*   HCT 31.4*   *   PLT 70*     BMP:  Recent Labs   Lab 09/02/20 0319   GLU 80   *   K 3.9      CO2 19*   BUN 11   CREATININE 0.7   CALCIUM 7.1*     LFT:  Lab Results   Component Value Date    AST 74 (H) 09/02/2020    ALKPHOS 66 09/02/2020    BILITOT 2.5 (H) 09/02/2020     Albumin:   Albumin   Date Value Ref Range Status   09/02/2020 1.7 (L) 3.5 - 5.2 g/dL Final     Protein:   Total Protein   Date Value Ref Range Status   09/02/2020 5.9 (L) 6.0 - 8.4 g/dL Final     Lactic acid:   Lab Results   Component Value Date    LACTATE 0.7 03/25/2019    LACTATE 2.1 03/25/2019       Significant Imaging: I have reviewed all pertinent imaging results/findings within the past 24 hours.

## 2020-09-02 NOTE — PLAN OF CARE
What's Next    What's Next          Follow up with George Cowan MD [id number] [facility] [address]  [phone]   Sep11 Follow up with Justin Parada MD  Friday Sep 11, 2020  at 3:20pm; previously scheduled hem/onc appointment [address] [address]  [phone]          09/02/20 1347   Final Note   Assessment Type Final Discharge Note   Anticipated Discharge Disposition Home   Hospital Follow Up  Appt(s) scheduled? Yes   Right Care Referral Info   Post Acute Recommendation No Care   Post-Acute Status   Post-Acute Authorization Other   Other Status No Post-Acute Service Needs   Discharge Delays None known at this time

## 2020-09-02 NOTE — PLAN OF CARE
09/02/20 1105   Post-Acute Status   Post-Acute Authorization Home Health   Home Health Status Referrals Sent     Patient expected to discharge today with home health. SW sent referrals via [facility]. Patient has medicaid SW sent a blast of referrals in an attempt of trying to obtain HH for patient.        Edita Morales LMSW  [facility]   [phone]

## 2020-09-02 NOTE — PLAN OF CARE
Problem: Fall Injury Risk  Goal: Absence of Fall and Fall-Related Injury  Outcome: Ongoing, Progressing     Problem: Adult Inpatient Plan of Care  Goal: Plan of Care Review  Outcome: Ongoing, Progressing  Goal: Patient-Specific Goal (Individualization)  Outcome: Ongoing, Progressing  Goal: Absence of Hospital-Acquired Illness or Injury  Outcome: Ongoing, Progressing  Goal: Optimal Comfort and Wellbeing  Outcome: Ongoing, Progressing  Goal: Readiness for Transition of Care  Outcome: Ongoing, Progressing  Goal: Rounds/Family Conference  Outcome: Ongoing, Progressing     Problem: Electrolyte Imbalance (Acute Kidney Injury/Impairment)  Goal: Serum Electrolyte Balance  Outcome: Ongoing, Progressing     Problem: Fluid Imbalance (Acute Kidney Injury/Impairment)  Goal: Optimal Fluid Balance  Outcome: Ongoing, Progressing     Problem: Hematologic Alteration (Acute Kidney Injury/Impairment)  Goal: Hemoglobin, Hematocrit and Platelets Within Normal Range  Outcome: Ongoing, Progressing     Problem: Oral Intake Inadequate (Acute Kidney Injury/Impairment)  Goal: Optimal Nutrition Intake  Outcome: Ongoing, Progressing     Problem: Renal Function Impairment (Acute Kidney Injury/Impairment)  Goal: Effective Renal Function  Outcome: Ongoing, Progressing     Problem: Skin Injury Risk Increased  Goal: Skin Health and Integrity  Outcome: Ongoing, Progressing     Problem: Coping Ineffective  Goal: Effective Coping  Outcome: Ongoing, Progressing     No acute or significant changes overnight. CIWA scores  were negative. No PRN Ativan was given. Pt. Had 1 loose stool. Tele sitter is at the bedside.

## 2020-09-02 NOTE — ASSESSMENT & PLAN NOTE
Reason for Consult: goals of care     Impression: mr. Davidson is a 60 yo man with history of hepatocellular carcinoma.  Admitted after being found confused and shaking by meal delivery service.  Presented to EMS after a wellness check. He is awake, alert and oriented. Abdomen is distended.  He also appears cachetic and malnourished  Reluctant to participate in conversation.  States no pain and has no behavior signs of pain. Minimal dyspnea on exertion and no acute distress. To be presented t IR conference for evaluation for treatment of HCC.  PETH pending       Bowel Management  - continue medical management.   - lactulose    Dyspnea   - no Supplemental oxygen  O2 Sat goal 96-98% on room air     Advance Care Planning     - Advanced directives documents received.    - Appears able to make own decisions. Reluctant to complete documents  - Next of kin for medical decision making is soraida Berg [phone]  - Full code per primary team, not amenable to any changes  - Appears to have some understanding of current clinical status.  Hard to determine as Mr. Davidson does not engage in goals of care conversation.         Goals of Care:   - On initial assessment Palliative medicine APRN and LCSW met with patient/family at bedside.  Palliative medicine introduced.  Mr. Davidson not amenable to conversation.  - Upon return this AM.  Mr. Davidson reports he will be discharged and will follow up at [facility] to receive treatment.  This was his plan prior to being brought to [facility].  - Patient receives care and assistance through [facility].   - Primary team  has been in contact with [facility] .  - Anticipate discharge this afternoon.    Plan Recommendations  - Continue current plan of care  -Patient and family may benefit from continued education and information regarding clinical condition  - Palliative medicine will sign off                 Primary team attending, resident, social  worker  notified aware of the above goals of care and recommendations.        .

## 2020-09-02 NOTE — PLAN OF CARE
09/02/20 1151   Post-Acute Status   Post-Acute Authorization Placement   Home Health Status Set-up Complete     SW set up transportation via [facility] and patient will be transported by wheelchair van. Transport is scheduled for 12:30  and he will be going to [facility]. PORSHA informed bedside nurse of this information.        Edita Morales LMSW  [facility]   [id number]

## 2020-09-03 ENCOUNTER — PATIENT OUTREACH (OUTPATIENT)
Dept: ADMINISTRATIVE | Facility: CLINIC | Age: 60
End: 2020-09-03

## 2020-09-03 NOTE — DISCHARGE SUMMARY
Discharge Summary  Hospital Medicine    Patient Name: Nii Davidson  MRN:  [id number]  [facility] Medicine Team: [facility] MED L Stas Holloway MD  Date of Admission:  8/28/2020     Date of Discharge:  09/03/2020  Length of Stay:  LOS: 5 days   Principal Problem:  Decompensated cirrhosis related to hepatitis C virus (HCV)     Active Hospital Problems    Diagnosis  POA    *Decompensated cirrhosis related to hepatitis C virus (HCV) [B19.20, K74.69]  Yes    Advanced care planning/counseling discussion [Z71.89]  Not Applicable    Goals of care, counseling/discussion [Z71.89]  Not Applicable    Dyspnea [R06.00]  Unknown    Palliative care encounter [Z51.5]  Not Applicable    Hepatocellular carcinoma metastatic to lung [C78.00, C22.0]  Yes    Liver mass [R16.0]  Yes    Alcoholic cirrhosis of liver without ascites [K70.30]  Yes    Coagulopathy [D68.9]  Yes    Thrombocytopenia [D69.6]  Yes    Hepatic encephalopathy [K72.90]  Yes    Portal vein thrombosis [I81]  Yes    KRISTA (acute kidney injury) [N17.9]  Yes    Hyponatremia [E87.1]  Yes    Macrocytic anemia [D53.9]  Yes    Alcohol use disorder, severe, dependence [F10.20]  Yes     Chronic      Resolved Hospital Problems   No resolved problems to display.       History of Present Illness:      Nii Davidson is a 59 year old male with history of hepatitis C cirrhosis, opiate abuse, and hypertension who presents today with altered mental status. Patient was found by food delivery service confused and shaking and was brought to the emergency department by EMS who stated that patient was confused and somnolent. He was given 0.5 mg of narcan by EMS after he was found to have depressed respiratory rate. They stated that it improved his mental status and respiratory rate. He stated that he may have had fevers over the past day but did not elaborate. He describes taking hydrocodone in the morning for nondescript pain. Patient also describes having a past medical history  "of "liver cancer" although review of chart is not clear.      There are recent laboratory results on 7/17 ordered by Dr. Simmons at [facility]. Of note, PTT 34, PT 13.8, INR 1.3, albumin 2.5, and AFP elevated 63. Previously seen in 2014 at [facility] with Hepatis C Cirrhosis. Grade II esophageal varices were seen in EGD and banded at that time.    Hospital Course of Principle Problem Addressed:       # Acute hepatic encephalopathy  - improving   - increase PO lactulose and cont rifaximin and add zinc  - infectious work up has been negative     # Multifocal HCC with metastasis   # Portal Vein Thrombosis   - 4.1 cm, elevated AFP  - MRI ab reviewed  - CT chest to evaluate for metastasis   -  Discussed at IR conference and they recommend y90 and systemic therapy   - overall patient has a poor prognosis as there is metastasis to lymph nodes      # Decompensated Hep C and alcohol cirrhosis without ascites  MELD-Na score: 17 at 9/2/2020  3:19 AM  MELD score: 14 at 9/2/2020  3:19 AM  Calculated from:  Serum Creatinine: 0.7 mg/dL (Rounded to 1 mg/dL) at 9/2/2020  3:19 AM  Serum Sodium: 133 mmol/L at 9/2/2020  3:19 AM  Total Bilirubin: 2.5 mg/dL at 9/2/2020  3:19 AM  INR(ratio): 1.4 at 9/2/2020  3:19 AM  Age: 59 years 11 months  - hepatology consulted  - PETH pending  - U/S liver reviewed    - not currently a liver transplant candidate  - thiamine and folic acid      # KRISTA  - likely pre-renal   - resolved      # Coagulopathy due to liver disease  - vitamin k for 3 days  - DIC labs     # Hyponatremia  - fluid restriction      # Macrocytic anemia  # Thrombocytopenia  - cont folic acid  - iron studies and folate b12 reviewed   - DIC labs        Diet:  Low sodium  GI PPx:    DVT PPx:    Goals of Care:  DNR     High Risk Conditions:  HE     Disposition:  Today with heme/onc at Amaya Holloway MD  Medical Director [facility] Medicine  Spectra:  [phone]  Pager: [phone]       Other " Medical Problems Addressed in the Hospital:         Significant Diagnostic Tests/Imaging:     Recent Labs   Lab 08/29/20 0358 08/30/20 0411 08/31/20  0407 09/01/20 0714 09/02/20  0319   WBC 14.49* 10.05 7.73 5.03 4.85   HGB 10.3* 11.2* 10.5* 10.5* 10.8*   HCT 30.2* 32.8* 30.3* 31.2* 31.4*   PLT 71* 67* 80* 71* 70*     Recent Labs   Lab 08/29/20  0358 08/30/20  0411 08/31/20 0407 09/01/20  0714 09/02/20 0319   * 134* 132* 135* 133*   K 3.5 3.5 3.1* 3.1* 3.9    106 106 108 109   CO2 19* 24 21* 22* 19*   BUN 27* 21* 15 14 11   CREATININE 1.6* 0.9 0.7 0.7 0.7   CALCIUM 7.3* 7.5* 7.2* 7.2* 7.1*   PROT 5.8* 5.9* 5.7* 5.7* 5.9*   BILITOT 2.4* 2.9* 2.8* 2.7* 2.5*   ALKPHOS 62 60 68 56 66   ALT 39 43 40 35 35   * 112* 95* 81* 74*         Special Treatments/Procedures:         Discharge Medications:      Discharge Medication List as of 9/2/2020 12:09 PM      START taking these medications    Details   lactulose (CHRONULAC) 20 gram/30 mL Soln Take 30 mLs (20 g total) by mouth 3 (three) times daily. TITRATE TO 3-4 BOWEL MOVEMENTS DAILY., Starting Wed 9/2/2020, Until Fri 10/2/2020, Normal         CONTINUE these medications which have NOT CHANGED    Details   cetirizine (ZYRTEC) 10 MG tablet Take 10 mg by mouth once daily., Historical Med      cyanocobalamin (VITAMIN B-12) 1000 MCG tablet Take 100 mcg by mouth once daily., Historical Med      folic acid (FOLVITE) 1 MG tablet Take 1 mg by mouth once daily., Historical Med      gabapentin (NEURONTIN) 600 MG tablet Take 600 mg by mouth 2 (two) times daily., Historical Med      HYDROcodone-acetaminophen (NORCO) 7.5-325 mg per tablet Take 1 tablet by mouth every 6 (six) hours as needed for Pain., Historical Med      loperamide (IMODIUM) 2 mg capsule Take 2 mg by mouth 4 (four) times daily as needed for Diarrhea., Historical Med      mirtazapine (REMERON) 15 MG tablet Take 15 mg by mouth every evening., Historical Med      multivitamin with folic acid (THERA)  400 mcg Tab Take 1 tablet by mouth once daily., Historical Med         STOP taking these medications       furosemide (LASIX) 20 MG tablet Comments:   Reason for Stopping:         megestroL (MEGACE) 400 mg/10 mL (40 mg/mL) Susp Comments:   Reason for Stopping:         naproxen (NAPROSYN) 250 MG tablet Comments:   Reason for Stopping:         spironolactone (ALDACTONE) 25 MG tablet Comments:   Reason for Stopping:               Discharge Diet:regular diet    Activity: activity as tolerated    Discharge Condition: Stable    Disposition: Home or Self Care    Time spent on the discharge of the patient including review of hospital course with the patient. reviewing discharge medications and arranging follow-up care 35 minutes.  Patient was seen and examined on the date of discharge and determined to be suitable for discharge.    No future appointments.    Stas Holloway MD  Medical Director [facility] Medicine  Spectra:  [phone]  Pager: [phone]

## 2020-09-03 NOTE — PROGRESS NOTES
Progress Note   Hospital Medicine         Patient Name: Nii Davidson  MRN:  [id number]  Hospital Medicine Team: [facility] [place] [facility]P MED L Stas Holloway MD  Date of Admission:  8/28/2020     Length of Stay:  LOS: 5 days   Expected Discharge Date: 9/2/2020  Principal Problem:  Decompensated cirrhosis related to hepatitis C virus (HCV)       Subjective:     Interval History/Overnight Events: patient back to his mental baseline; plan to d/c home today; already was on lactulose; follow up with heme/onc at [facility];     Review of Systems     UNABLE TO OBTAIN DUE TO PATIENT'S CONDITION     Objective:            Physical Exam:  Constitutional: appears weak and ill  Head: Normocephalic and atraumatic.   Mouth/Throat: Oropharynx is clear and moist.   Eyes: EOM are normal. Pupils are equal, round, and reactive to light. positive scleral icterus.   Neck: Normal range of motion. Neck supple.   Cardiovascular: Normal rate and regular rhythm.  No murmur heard.  Pulmonary/Chest: Effort normal and breath sounds normal. No respiratory distress. No wheezes, rales, or rhonchi  Abdominal: Soft. Bowel sounds are normal.  No distension or tenderness  Musculoskeletal: Normal range of motion. No edema.   Neurological: alert only too self    Skin: Skin is warm and dry.   Psychiatric: Normal mood and affect. Behavior is normal.     Recent Labs   Lab 08/28/20  1552 08/29/20  0358 08/30/20  0411 08/31/20 0407 09/01/20 0714 09/02/20  0319   WBC 14.30* 14.49* 10.05 7.73 5.03 4.85   HGB 11.7* 10.3* 11.2* 10.5* 10.5* 10.8*   HCT 33.6* 30.2* 32.8* 30.3* 31.2* 31.4*   PLT 86* 71* 67* 80* 71* 70*     Recent Labs   Lab 08/31/20  0407 09/01/20  0714 09/02/20  0319   * 135* 133*   K 3.1* 3.1* 3.9    108 109   CO2 21* 22* 19*   BUN 15 14 11   CREATININE 0.7 0.7 0.7   GLU 89 96 80   CALCIUM 7.2* 7.2* 7.1*     Recent Labs   Lab 08/31/20 0407 09/01/20  0714 09/02/20  0319   ALKPHOS 68 56 66   ALT 40 35 35   AST 95* 81* 74*   ALBUMIN 1.7* 1.7* 1.7*   PROT  5.7* 5.7* 5.9*   BILITOT 2.8* 2.7* 2.5*   INR 1.4* 1.4* 1.4*     Recent Labs   Lab 08/28/20  1559   POCTGLUCOSE 71           Assessment and Plan     Mr. Nii Davidson is a 59 y.o. male who presented to [facility] on 8/28/2020 with     Hospital Course:    Mr. 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     # Acute hepatic encephalopathy  - improving   - increase PO lactulose and cont rifaximin and add zinc  - infectious work up has been negative    # Multifocal HCC with metastasis   # Portal Vein Thrombosis   - 4.1 cm, elevated AFP  - MRI ab reviewed  - CT chest to evaluate for metastasis   -  Discussed at IR conference and they recommend y90 and systemic therapy   - overall patient has a poor prognosis as there is metastasis to lymph nodes     # Decompensated Hep C and alcohol cirrhosis without ascites  MELD-Na score: 17 at 9/2/2020  3:19 AM  MELD score: 14 at 9/2/2020  3:19 AM  Calculated from:  Serum Creatinine: 0.7 mg/dL (Rounded to 1 mg/dL) at 9/2/2020  3:19 AM  Serum Sodium: 133 mmol/L at 9/2/2020   3:19 AM  Total Bilirubin: 2.5 mg/dL at 9/2/2020  3:19 AM  INR(ratio): 1.4 at 9/2/2020  3:19 AM  Age: 59 years 11 months  - hepatology consulted  - PETH pending  - U/S liver reviewed    - not currently a liver transplant candidate  - thiamine and folic acid     # KRISTA  - likely pre-renal   - resolved     # Coagulopathy due to liver disease  - vitamin k for 3 days  - DIC labs    # Hyponatremia  - fluid restriction     # Macrocytic anemia  # Thrombocytopenia  - cont folic acid  - iron studies and folate b12 reviewed   - DIC labs      Diet:  Low sodium  GI PPx:    DVT PPx:    Goals of Care:  DNR    High Risk Conditions:  HE    Disposition:  Today with heme/onc at [facility]    Stas Holloway MD  Medical Director [facility] Medicine  Spectra:  [phone]  Pager: [phone]

## 2020-09-03 NOTE — PROGRESS NOTES
C3 nurse attempted to contact patient. No answer. The following message was left for the patient to return the call:  Good evening I am a nurse calling on behalf of [facility] from the [facility].  This is a Transitional Care Call for Nii. When you have a moment please contact us at [phone] or [phone] Monday through Friday, between the hours of 8 am to 4 pm. We look forward to speaking with you. On behalf of [facility] have a nice day.    The patient does not have a scheduled HOSFU appointment within 7-14 days post hospital discharge date 9/2/20. Non [facility] PCP.

## 2020-09-08 LAB — PHOSPHATIDYLETHANOL (PETH): NEGATIVE NG/ML
